# Patient Record
Sex: FEMALE | NOT HISPANIC OR LATINO | URBAN - METROPOLITAN AREA
[De-identification: names, ages, dates, MRNs, and addresses within clinical notes are randomized per-mention and may not be internally consistent; named-entity substitution may affect disease eponyms.]

---

## 2017-04-28 ENCOUNTER — TRANSFERRED RECORDS (OUTPATIENT)
Dept: HEALTH INFORMATION MANAGEMENT | Facility: CLINIC | Age: 57
End: 2017-04-28

## 2017-11-29 ENCOUNTER — OFFICE VISIT (OUTPATIENT)
Dept: INTERNAL MEDICINE | Facility: CLINIC | Age: 57
End: 2017-11-29
Attending: INTERNAL MEDICINE
Payer: COMMERCIAL

## 2017-11-29 VITALS
DIASTOLIC BLOOD PRESSURE: 75 MMHG | WEIGHT: 129 LBS | HEART RATE: 87 BPM | HEIGHT: 63 IN | BODY MASS INDEX: 22.86 KG/M2 | SYSTOLIC BLOOD PRESSURE: 132 MMHG

## 2017-11-29 DIAGNOSIS — E78.00 PURE HYPERCHOLESTEROLEMIA: Primary | ICD-10-CM

## 2017-11-29 DIAGNOSIS — N20.0 NEPHROLITHIASIS: ICD-10-CM

## 2017-11-29 DIAGNOSIS — Z90.721 S/P ABDOMINAL HYSTERECTOMY AND LEFT SALPINGO-OOPHORECTOMY: ICD-10-CM

## 2017-11-29 DIAGNOSIS — N60.01 BENIGN CYST OF BREAST, RIGHT: ICD-10-CM

## 2017-11-29 DIAGNOSIS — Z90.710 S/P ABDOMINAL HYSTERECTOMY AND LEFT SALPINGO-OOPHORECTOMY: ICD-10-CM

## 2017-11-29 DIAGNOSIS — Z90.79 S/P ABDOMINAL HYSTERECTOMY AND LEFT SALPINGO-OOPHORECTOMY: ICD-10-CM

## 2017-11-29 DIAGNOSIS — Z85.828 HISTORY OF SKIN CANCER: ICD-10-CM

## 2017-11-29 DIAGNOSIS — I10 BENIGN ESSENTIAL HYPERTENSION: ICD-10-CM

## 2017-11-29 LAB
ANION GAP SERPL CALCULATED.3IONS-SCNC: 8 MMOL/L (ref 3–14)
BUN SERPL-MCNC: 14 MG/DL (ref 7–30)
CALCIUM SERPL-MCNC: 9 MG/DL (ref 8.5–10.1)
CHLORIDE SERPL-SCNC: 101 MMOL/L (ref 94–109)
CHOLEST SERPL-MCNC: 195 MG/DL
CO2 SERPL-SCNC: 29 MMOL/L (ref 20–32)
CREAT SERPL-MCNC: 0.6 MG/DL (ref 0.52–1.04)
GFR SERPL CREATININE-BSD FRML MDRD: >90 ML/MIN/1.7M2
GLUCOSE SERPL-MCNC: 109 MG/DL (ref 70–99)
HDLC SERPL-MCNC: 79 MG/DL
LDLC SERPL CALC-MCNC: 101 MG/DL
NONHDLC SERPL-MCNC: 116 MG/DL
POTASSIUM SERPL-SCNC: 3.6 MMOL/L (ref 3.4–5.3)
SODIUM SERPL-SCNC: 138 MMOL/L (ref 133–144)
TRIGL SERPL-MCNC: 75 MG/DL

## 2017-11-29 PROCEDURE — 36415 COLL VENOUS BLD VENIPUNCTURE: CPT | Performed by: INTERNAL MEDICINE

## 2017-11-29 PROCEDURE — 80048 BASIC METABOLIC PNL TOTAL CA: CPT | Performed by: INTERNAL MEDICINE

## 2017-11-29 PROCEDURE — 99213 OFFICE O/P EST LOW 20 MIN: CPT | Mod: ZF

## 2017-11-29 PROCEDURE — 80061 LIPID PANEL: CPT | Performed by: INTERNAL MEDICINE

## 2017-11-29 RX ORDER — SIMVASTATIN 20 MG
20 TABLET ORAL AT BEDTIME
Qty: 90 TABLET | Refills: 1 | COMMUNITY
Start: 2017-11-29 | End: 2018-12-14

## 2017-11-29 RX ORDER — LISINOPRIL AND HYDROCHLOROTHIAZIDE 12.5; 2 MG/1; MG/1
1 TABLET ORAL DAILY
Qty: 90 TABLET | Refills: 3 | Status: SHIPPED | OUTPATIENT
Start: 2017-11-29 | End: 2018-11-30

## 2017-11-29 ASSESSMENT — PAIN SCALES - GENERAL: PAINLEVEL: NO PAIN (0)

## 2017-11-29 NOTE — PROGRESS NOTES
HPI  Patient is here to establish care. She denies active medical problems that need to be addressed today. She had a flu shot in October. She has not had pneumococcal vaccine.     Review of Systems     Constitutional:  Negative for fever, chills, fatigue and increased energy.   HENT:  Negative for ear pain and dry mouth.    Eyes:  Negative for pain, eye pain and decreased vision.   Respiratory:   Negative for cough and dyspnea on exertion.    Cardiovascular:  Negative for chest pain, dyspnea on exertion and edema.   Gastrointestinal:  Negative for abdominal pain, diarrhea and constipation.   Genitourinary:  Negative for hot flashes and postmenopausal bleeding.   Neurological:  Negative for dizziness and headaches.   Endo/Heme:  Negative for anemia, swollen glands and bruises/bleeds easily.   Psychiatric/Behavioral:  Negative for depression, decreased concentration, mood swings and panic attacks.    Breast:  Negative for breast discharge, breast mass, breast pain and nipple retraction.   Endocrine:  Negative for altered temperature regulation, polyphagia, polydipsia, unwanted hair growth and change in facial hair.    Current Outpatient Prescriptions   Medication     estrogens, conjugated, (PREMARIN) 0.3 MG tablet     lisinopril-hydrochlorothiazide (PRINZIDE/ZESTORETIC) 20-12.5 MG per tablet     simvastatin (ZOCOR) 20 MG tablet     No current facility-administered medications for this visit.      Past Medical History:   Diagnosis Date     Abnormal uterine bleeding      Essential hypertension      Hyperlipidemia      Nephrolithiasis      Past Surgical History:   Procedure Laterality Date     C VAGINAL HYSTERECTOMY       KNEE SURGERY       Family History   Problem Relation Age of Onset     Cirrhosis Mother      OSTEOPOROSIS Mother      Melanoma Mother      Pancreatic Cancer Father      Social History     Social History     Marital status:      Spouse name: N/A     Number of children: N/A     Years of education:  "N/A     Occupational History     Not on file.     Social History Main Topics     Smoking status: Never Smoker     Smokeless tobacco: Not on file     Alcohol use 0.6 oz/week     1 Glasses of wine per week     Drug use: No     Sexual activity: Yes     Partners: Male     Birth control/ protection: None     Other Topics Concern     Not on file     Social History Narrative     No narrative on file         Vitals:    11/29/17 1037 11/29/17 1039 11/29/17 1040   BP: 134/79 136/83 132/75   Pulse: 103 93 87   Weight: 58.5 kg (129 lb)     Height: 1.6 m (5' 3\")         Physical Exam   Constitutional: She is oriented to person, place, and time and well-developed, well-nourished, and in no distress.   HENT:   Head: Normocephalic and atraumatic.   Eyes: Conjunctivae are normal. Pupils are equal, round, and reactive to light.   Neck: Normal range of motion.   Cardiovascular: Normal rate, regular rhythm, normal heart sounds and intact distal pulses.    Pulmonary/Chest: Effort normal.   Abdominal: Soft.   Musculoskeletal: She exhibits no edema.   Neurological: She is alert and oriented to person, place, and time.   Skin: Skin is warm and dry.   Psychiatric: Mood, memory, affect and judgment normal.   Vitals reviewed.    Assessment and Plan:  Cande was seen today for establish care.    Diagnoses and all orders for this visit:    Pure hypercholesterolemia. Patient was advised on approach to hyperlipidemia and prevention of coronary artery disease. Recommend stopping statin therapy for 3 months to determine lipid baseline. Patient will follow up to discuss the risk and risk management. She is in agreement with the plan.   -     Lipid Profile  -     Lipid Profile; Future    Benign essential hypertension. Blood pressure is currently within acceptable range. Recommend to continue with current medical therapy. Patient was advised to check renal function and electrolytes.   -     lisinopril-hydrochlorothiazide (PRINZIDE/ZESTORETIC) 20-12.5 MG " per tablet; Take 1 tablet by mouth daily  -     Basic Metabolic Panel    S/P abdominal hysterectomy and left salpingo-oophorectomy. Reviewed risk for CAD and stroke in the setting of HRT. Patient is on low dose of HRT, understands the risks and benefits of therapy, including the risk of thromboembolic disease. She would like to continue with HRT at this time. Will address the need for therapy annually.   -     estrogens, conjugated, (PREMARIN) 0.3 MG tablet; Take 1 tablet (0.3 mg) by mouth daily    History of skin cancer  -     DERMATOLOGY REFERRAL    Benign cyst of breast, right  -     Mammo diagnostic  digital (bilateral); Future  -     US Breast Right Complete 4 Quadrants; Future    Nephrolithiasis. Discussed impact of hydrochlorothiazide on nephrolithiasis. Patient was advised to obtain CT to determine the size and location of the stones. Will also refer to Nephrology for stone risk assessment.   -     NEPHROLOGY ADULT REFERRAL  -     CT Abdomen Pelvis w/o Contrast; Future      Total time spent 45  minutes.  More than 50% of the time spent with Ms. Curiel on counseling / coordinating her care    Analilia Wiley MD

## 2017-11-29 NOTE — LETTER
12/6/2017         Cande Curiel   [address] [place] [address]        Dear Ms. Curiel:    Your glucose was mildly elevated.Other results were within acceptable range.   Analilia Wiley MD     Results for orders placed or performed in visit on 11/29/17   Basic Metabolic Panel   Result Value Ref Range    Sodium 138 133 - 144 mmol/L    Potassium 3.6 3.4 - 5.3 mmol/L    Chloride 101 94 - 109 mmol/L    Carbon Dioxide 29 20 - 32 mmol/L    Anion Gap 8 3 - 14 mmol/L    Glucose 109 (H) 70 - 99 mg/dL    Urea Nitrogen 14 7 - 30 mg/dL    Creatinine 0.60 0.52 - 1.04 mg/dL    GFR Estimate >90 >60 mL/min/1.7m2    GFR Estimate If Black >90 >60 mL/min/1.7m2    Calcium 9.0 8.5 - 10.1 mg/dL   Lipid Profile   Result Value Ref Range    Cholesterol 195 <200 mg/dL    Triglycerides 75 <150 mg/dL    HDL Cholesterol 79 >49 mg/dL    LDL Cholesterol Calculated 101 (H) <100 mg/dL    Non HDL Cholesterol 116 <130 mg/dL         Please note that test explanations are brief and do not reflect all diagnostic uses.  If you have any questions or concerns, please call the clinic at [phone].      Sincerely,      Sharon Huffman sent on behalf of  Analilia Wiley MD

## 2017-11-29 NOTE — LETTER
Date:December 4, 2017      Patient was self referred, no letter generated. Do not send.        [facility] Health Information

## 2017-11-29 NOTE — MR AVS SNAPSHOT
After Visit Summary   11/29/2017    Cande Curiel    MRN: [id number]           Patient Information     Date Of Birth          11/23/1950        Visit Information        Provider Department      11/29/2017 10:20 AM Analilia Wiley MD [facility]         Today's Diagnoses     Pure hypercholesterolemia    -  1    Benign essential hypertension        S/P abdominal hysterectomy and left salpingo-oophorectomy        History of skin cancer        Benign cyst of breast, right        Nephrolithiasis           Follow-ups after your visit        Additional Services     DERMATOLOGY REFERRAL       Your provider has referred you to: [facility]: Dermatology [facility] [phone]   [email or web address]    Please be aware that coverage of these services is subject to the terms and limitations of your health insurance plan.  Call member services at your health plan with any benefit or coverage questions.      Please bring the following with you to your appointment:    (1) Any X-Rays, CTs or MRIs which have been performed.  Contact the facility where they were done to arrange for  prior to your scheduled appointment.    (2) List of current medications  (3) This referral request   (4) Any documents/labs given to you for this referral            NEPHROLOGY ADULT REFERRAL       Your provider has referred you to: [facility]: Kidney (Nephrology) [facility] [phone]   [email or web address]    Please be aware that coverage of these services is subject to the terms and limitations of your health insurance plan.  Call member services at your health plan with any benefit or coverage questions.      Reason for referral:  Nephrolithiasis  Please bring the following to your appointment:    >>   Any x-rays, CTs or MRIs which have been performed.  Contact the facility where they were done to arrange for pick  up prior to your scheduled appointment.   >>   List of current medications   >>   This referral request   >>   Any documents/labs given to you for this referral                  Future tests that were ordered for you today     Open Future Orders        Priority Expected Expires Ordered    CT Abdomen Pelvis w/o Contrast Routine  2018    Lipid Profile Routine  2018    Mammo diagnostic  digital (bilateral) Routine  2018    US Breast Right Complete 4 Quadrants Routine  2018            Who to contact     Please call your clinic at [phone] to:    Ask questions about your health    Make or cancel appointments    Discuss your medicines    Learn about your test results    Speak to your doctor   If you have compliments or concerns about an experience at your clinic, or if you wish to file a complaint, please contact [facility] Patient Relations at [phone] or email us at [email or web address]         Additional Information About Your Visit        [employer] Information     [employer] is an electronic gateway that provides easy, online access to your medical records. With [employer], you can request a clinic appointment, read your test results, renew a prescription or communicate with your care team.     To sign up for [employer] visit the website at [email or web address]   You will be asked to enter the access code listed below, as well as some personal information. Please follow the directions to create your username and password.     Your access code is: [email or web address]  Expires: 2018  2:06 PM     Your access code will  in 90 days. If you need help or a new code, please contact your [facility] or call [phone] for assistance.        Care EveryWhere ID     This is your Care EveryWhere ID. This could be used by other organizations to access your [facility]  "records  [id number]        Your Vitals Were     Pulse Height BMI (Body Mass Index)             87 1.6 m (5' 3\") 22.85 kg/m2          Blood Pressure from Last 3 Encounters:   11/29/17 132/75    Weight from Last 3 Encounters:   11/29/17 58.5 kg (129 lb)              We Performed the Following     Basic Metabolic Panel     DERMATOLOGY REFERRAL     Lipid Profile     NEPHROLOGY ADULT REFERRAL          Today's Medication Changes          These changes are accurate as of: 11/29/17 11:23 AM.  If you have any questions, ask your nurse or doctor.               Start taking these medicines.        Dose/Directions    estrogens (conjugated) 0.3 MG tablet   Commonly known as:  PREMARIN   Used for:  S/P abdominal hysterectomy and left salpingo-oophorectomy   Started by:  Analilia Wiley MD        Dose:  0.3 mg   Take 1 tablet (0.3 mg) by mouth daily   Quantity:  90 tablet   Refills:  3       lisinopril-hydrochlorothiazide 20-12.5 MG per tablet   Commonly known as:  PRINZIDE/ZESTORETIC   Used for:  Benign essential hypertension   Started by:  Analilia Wiley MD        Dose:  1 tablet   Take 1 tablet by mouth daily   Quantity:  90 tablet   Refills:  3            Where to get your medicines      These medications were sent to [id number] - [place], [place] - [address], [place] [address]     Phone:  [phone]     estrogens (conjugated) 0.3 MG tablet    lisinopril-hydrochlorothiazide 20-12.5 MG per tablet                Primary Care Provider Office Phone # Fax #    Analilia Wiley [phone]       [facility] [address]  [facility] [address]        Equal Access to Services     [facility] CenterBRANDEE : Hadalba Garcia, jarred lulilly, qaybta kaalmada jeremias, libby jackman. So [facility] [phone].    ATENCIÓN: Si habla español, tiene a camacho disposición servicios gratuitos de " asistencia lingüística. Lucinda al [phone].    We comply with applicable federal civil rights laws and [place] laws. We do not discriminate on the basis of race, color, national origin, age, disability, sex, sexual orientation, or gender identity.            Thank you!     Thank you for choosing [facility]   for your care. Our goal is always to provide you with excellent care. Hearing back from our patients is one way we can continue to improve our services. Please take a few minutes to complete the written survey that you may receive in the mail after your visit with us. Thank you!             Your Updated Medication List - Protect others around you: Learn how to safely use, store and throw away your medicines at www.disposemymeds.org.          This list is accurate as of: 11/29/17 11:23 AM.  Always use your most recent med list.                   Brand Name Dispense Instructions for use Diagnosis    estrogens (conjugated) 0.3 MG tablet    PREMARIN    90 tablet    Take 1 tablet (0.3 mg) by mouth daily    S/P abdominal hysterectomy and left salpingo-oophorectomy       lisinopril-hydrochlorothiazide 20-12.5 MG per tablet    PRINZIDE/ZESTORETIC    90 tablet    Take 1 tablet by mouth daily    Benign essential hypertension       simvastatin 20 MG tablet    ZOCOR    90 tablet    Take 1 tablet (20 mg) by mouth At Bedtime    Pure hypercholesterolemia

## 2017-11-29 NOTE — LETTER
11/29/2017       RE: Cande Curiel  [address] [place] [address]     Dear Colleague,    Thank you for referring your patient, Cande Curiel, to the [facility]  at [facility]. Please see a copy of my visit note below.    HPI  Patient is here to establish care. She denies active medical problems that need to be addressed today. She had a flu shot in October. She has not had pneumococcal vaccine.     Review of Systems     Constitutional:  Negative for fever, chills, fatigue and increased energy.   HENT:  Negative for ear pain and dry mouth.    Eyes:  Negative for pain, eye pain and decreased vision.   Respiratory:   Negative for cough and dyspnea on exertion.    Cardiovascular:  Negative for chest pain, dyspnea on exertion and edema.   Gastrointestinal:  Negative for abdominal pain, diarrhea and constipation.   Genitourinary:  Negative for hot flashes and postmenopausal bleeding.   Neurological:  Negative for dizziness and headaches.   Endo/Heme:  Negative for anemia, swollen glands and bruises/bleeds easily.   Psychiatric/Behavioral:  Negative for depression, decreased concentration, mood swings and panic attacks.    Breast:  Negative for breast discharge, breast mass, breast pain and nipple retraction.   Endocrine:  Negative for altered temperature regulation, polyphagia, polydipsia, unwanted hair growth and change in facial hair.    Current Outpatient Prescriptions   Medication     estrogens, conjugated, (PREMARIN) 0.3 MG tablet     lisinopril-hydrochlorothiazide (PRINZIDE/ZESTORETIC) 20-12.5 MG per tablet     simvastatin (ZOCOR) 20 MG tablet     No current facility-administered medications for this visit.      Past Medical History:   Diagnosis Date     Abnormal uterine bleeding      Essential hypertension      Hyperlipidemia      Nephrolithiasis      Past Surgical History:   Procedure Laterality Date     C VAGINAL HYSTERECTOMY       KNEE  "SURGERY       Family History   Problem Relation Age of Onset     Cirrhosis Mother      OSTEOPOROSIS Mother      Melanoma Mother      Pancreatic Cancer Father      Social History     Social History     Marital status:      Spouse name: N/A     Number of children: N/A     Years of education: N/A     Occupational History     Not on file.     Social History Main Topics     Smoking status: Never Smoker     Smokeless tobacco: Not on file     Alcohol use 0.6 oz/week     1 Glasses of wine per week     Drug use: No     Sexual activity: Yes     Partners: Male     Birth control/ protection: None     Other Topics Concern     Not on file     Social History Narrative     No narrative on file         Vitals:    11/29/17 1037 11/29/17 1039 11/29/17 1040   BP: 134/79 136/83 132/75   Pulse: 103 93 87   Weight: 58.5 kg (129 lb)     Height: 1.6 m (5' 3\")         Physical Exam   Constitutional: She is oriented to person, place, and time and well-developed, well-nourished, and in no distress.   HENT:   Head: Normocephalic and atraumatic.   Eyes: Conjunctivae are normal. Pupils are equal, round, and reactive to light.   Neck: Normal range of motion.   Cardiovascular: Normal rate, regular rhythm, normal heart sounds and intact distal pulses.    Pulmonary/Chest: Effort normal.   Abdominal: Soft.   Musculoskeletal: She exhibits no edema.   Neurological: She is alert and oriented to person, place, and time.   Skin: Skin is warm and dry.   Psychiatric: Mood, memory, affect and judgment normal.   Vitals reviewed.    Assessment and Plan:  Cande was seen today for establish care.    Diagnoses and all orders for this visit:    Pure hypercholesterolemia. Patient was advised on approach to hyperlipidemia and prevention of coronary artery disease. Recommend stopping statin therapy for 3 months to determine lipid baseline. Patient will follow up to discuss the risk and risk management. She is in agreement with the plan.   -     Lipid Profile  -  "    Lipid Profile; Future    Benign essential hypertension. Blood pressure is currently within acceptable range. Recommend to continue with current medical therapy. Patient was advised to check renal function and electrolytes.   -     lisinopril-hydrochlorothiazide (PRINZIDE/ZESTORETIC) 20-12.5 MG per tablet; Take 1 tablet by mouth daily  -     Basic Metabolic Panel    S/P abdominal hysterectomy and left salpingo-oophorectomy. Reviewed risk for CAD and stroke in the setting of HRT. Patient is on low dose of HRT, understands the risks and benefits of therapy, including the risk of thromboembolic disease. She would like to continue with HRT at this time. Will address the need for therapy annually.   -     estrogens, conjugated, (PREMARIN) 0.3 MG tablet; Take 1 tablet (0.3 mg) by mouth daily    History of skin cancer  -     DERMATOLOGY REFERRAL    Benign cyst of breast, right  -     Mammo diagnostic  digital (bilateral); Future  -     US Breast Right Complete 4 Quadrants; Future    Nephrolithiasis. Discussed impact of hydrochlorothiazide on nephrolithiasis. Patient was advised to obtain CT to determine the size and location of the stones. Will also refer to Nephrology for stone risk assessment.   -     NEPHROLOGY ADULT REFERRAL  -     CT Abdomen Pelvis w/o Contrast; Future      Total time spent 45  minutes.  More than 50% of the time spent with Ms. Curiel on counseling / coordinating her care    Analilia Wiley MD              Again, thank you for allowing me to participate in the care of your patient.      Sincerely,    Analilia Wiley MD

## 2017-12-02 ASSESSMENT — ENCOUNTER SYMPTOMS
PANIC: 0
DYSPNEA ON EXERTION: 0
BREAST PAIN: 0
DECREASED CONCENTRATION: 0
DIARRHEA: 0
INSOMNIA: 0
CONSTIPATION: 0
FATIGUE: 0
HOT FLASHES: 0
HEADACHES: 0
INCREASED ENERGY: 0
ALTERED TEMPERATURE REGULATION: 0
NERVOUS/ANXIOUS: 0
BREAST MASS: 0
CHILLS: 0
SWOLLEN GLANDS: 0
FEVER: 0
ABDOMINAL PAIN: 0
POLYPHAGIA: 0
POLYDIPSIA: 0
DEPRESSION: 0
COUGH: 0
BRUISES/BLEEDS EASILY: 0
DIZZINESS: 0
EYE PAIN: 0

## 2017-12-06 ENCOUNTER — HOSPITAL ENCOUNTER (OUTPATIENT)
Dept: CT IMAGING | Facility: CLINIC | Age: 57
Discharge: HOME | End: 2017-12-06
Attending: INTERNAL MEDICINE | Admitting: INTERNAL MEDICINE
Payer: COMMERCIAL

## 2017-12-06 DIAGNOSIS — N20.0 NEPHROLITHIASIS: ICD-10-CM

## 2017-12-06 PROCEDURE — 74176 CT ABD & PELVIS W/O CONTRAST: CPT

## 2018-01-28 ENCOUNTER — HEALTH MAINTENANCE LETTER (OUTPATIENT)
Age: 58
End: 2018-01-28

## 2018-11-30 DIAGNOSIS — I10 BENIGN ESSENTIAL HYPERTENSION: ICD-10-CM

## 2018-12-07 RX ORDER — LISINOPRIL AND HYDROCHLOROTHIAZIDE 12.5; 2 MG/1; MG/1
TABLET ORAL
Qty: 30 TABLET | Refills: 0 | Status: SHIPPED | OUTPATIENT
Start: 2018-12-07 | End: 2018-12-14

## 2018-12-07 NOTE — TELEPHONE ENCOUNTER
Received refill request for lisinopril/hctz.  Last in clinic 12/2017.    Tried to reach Cande but received voicemail.  Left message that refill request was received and a one month supply can be sent to pharmacy but office visit is due for further refills. Please call [phone] to schedule.

## 2018-12-14 ENCOUNTER — ANCILLARY PROCEDURE (OUTPATIENT)
Dept: MAMMOGRAPHY | Facility: CLINIC | Age: 58
End: 2018-12-14
Payer: COMMERCIAL

## 2018-12-14 ENCOUNTER — OFFICE VISIT (OUTPATIENT)
Dept: INTERNAL MEDICINE | Facility: CLINIC | Age: 58
End: 2018-12-14
Attending: INTERNAL MEDICINE
Payer: COMMERCIAL

## 2018-12-14 VITALS
HEIGHT: 63 IN | BODY MASS INDEX: 22.86 KG/M2 | SYSTOLIC BLOOD PRESSURE: 156 MMHG | WEIGHT: 129 LBS | DIASTOLIC BLOOD PRESSURE: 91 MMHG | HEART RATE: 120 BPM

## 2018-12-14 DIAGNOSIS — Z00.00 ROUTINE HISTORY AND PHYSICAL EXAMINATION OF ADULT: Primary | ICD-10-CM

## 2018-12-14 DIAGNOSIS — Z90.721 S/P ABDOMINAL HYSTERECTOMY AND LEFT SALPINGO-OOPHORECTOMY: ICD-10-CM

## 2018-12-14 DIAGNOSIS — Z11.4 ENCOUNTER FOR SCREENING FOR HIV: ICD-10-CM

## 2018-12-14 DIAGNOSIS — Z23 NEED FOR IMMUNIZATION AGAINST INFLUENZA: ICD-10-CM

## 2018-12-14 DIAGNOSIS — Z23 NEED FOR TDAP VACCINATION: ICD-10-CM

## 2018-12-14 DIAGNOSIS — I10 BENIGN ESSENTIAL HYPERTENSION: ICD-10-CM

## 2018-12-14 DIAGNOSIS — Z90.79 S/P ABDOMINAL HYSTERECTOMY AND LEFT SALPINGO-OOPHORECTOMY: ICD-10-CM

## 2018-12-14 DIAGNOSIS — Z12.31 VISIT FOR SCREENING MAMMOGRAM: ICD-10-CM

## 2018-12-14 DIAGNOSIS — Z90.710 S/P ABDOMINAL HYSTERECTOMY AND LEFT SALPINGO-OOPHORECTOMY: ICD-10-CM

## 2018-12-14 DIAGNOSIS — Z11.59 NEED FOR HEPATITIS C SCREENING TEST: ICD-10-CM

## 2018-12-14 DIAGNOSIS — Z13.220 SCREENING FOR HYPERLIPIDEMIA: ICD-10-CM

## 2018-12-14 LAB
ANION GAP SERPL CALCULATED.3IONS-SCNC: 4 MMOL/L (ref 3–14)
BUN SERPL-MCNC: 14 MG/DL (ref 7–30)
CALCIUM SERPL-MCNC: 8.9 MG/DL (ref 8.5–10.1)
CHLORIDE SERPL-SCNC: 103 MMOL/L (ref 94–109)
CHOLEST SERPL-MCNC: 225 MG/DL
CO2 SERPL-SCNC: 32 MMOL/L (ref 20–32)
CREAT SERPL-MCNC: 0.68 MG/DL (ref 0.52–1.04)
GFR SERPL CREATININE-BSD FRML MDRD: 90 ML/MIN/1.7M2
GLUCOSE SERPL-MCNC: 99 MG/DL (ref 70–99)
HCV AB SERPL QL IA: NONREACTIVE
HDLC SERPL-MCNC: 69 MG/DL
HIV 1+2 AB+HIV1 P24 AG SERPL QL IA: NONREACTIVE
LDLC SERPL CALC-MCNC: 137 MG/DL
NONHDLC SERPL-MCNC: 156 MG/DL
POTASSIUM SERPL-SCNC: 3.6 MMOL/L (ref 3.4–5.3)
SODIUM SERPL-SCNC: 139 MMOL/L (ref 133–144)
TRIGL SERPL-MCNC: 94 MG/DL

## 2018-12-14 PROCEDURE — 80061 LIPID PANEL: CPT | Performed by: INTERNAL MEDICINE

## 2018-12-14 PROCEDURE — 90715 TDAP VACCINE 7 YRS/> IM: CPT | Mod: ZF

## 2018-12-14 PROCEDURE — 36415 COLL VENOUS BLD VENIPUNCTURE: CPT | Performed by: INTERNAL MEDICINE

## 2018-12-14 PROCEDURE — G0463 HOSPITAL OUTPT CLINIC VISIT: HCPCS | Mod: ZF

## 2018-12-14 PROCEDURE — 80048 BASIC METABOLIC PNL TOTAL CA: CPT | Performed by: INTERNAL MEDICINE

## 2018-12-14 PROCEDURE — G0008 ADMIN INFLUENZA VIRUS VAC: HCPCS | Mod: ZF

## 2018-12-14 PROCEDURE — 86803 HEPATITIS C AB TEST: CPT | Performed by: INTERNAL MEDICINE

## 2018-12-14 PROCEDURE — 25000128 H RX IP 250 OP 636: Mod: ZF

## 2018-12-14 PROCEDURE — 90686 IIV4 VACC NO PRSV 0.5 ML IM: CPT | Mod: ZF

## 2018-12-14 PROCEDURE — 90472 IMMUNIZATION ADMIN EACH ADD: CPT | Mod: ZF

## 2018-12-14 PROCEDURE — 87389 HIV-1 AG W/HIV-1&-2 AB AG IA: CPT | Performed by: INTERNAL MEDICINE

## 2018-12-14 RX ORDER — LISINOPRIL AND HYDROCHLOROTHIAZIDE 12.5; 2 MG/1; MG/1
1 TABLET ORAL DAILY
Qty: 90 TABLET | Refills: 3 | Status: SHIPPED | OUTPATIENT
Start: 2018-12-14 | End: 2019-12-20

## 2018-12-14 ASSESSMENT — ENCOUNTER SYMPTOMS
SPUTUM PRODUCTION: 0
NIGHT SWEATS: 0
SORE THROAT: 0
BLOATING: 0
LEG SWELLING: 0
DIFFICULTY URINATING: 0
WEIGHT LOSS: 0
STIFFNESS: 0
SINUS CONGESTION: 0
EYE REDNESS: 0
EYE IRRITATION: 0
RECTAL BLEEDING: 0
COUGH DISTURBING SLEEP: 0
ABDOMINAL PAIN: 0
SMELL DISTURBANCE: 0
TROUBLE SWALLOWING: 0
BRUISES/BLEEDS EASILY: 0
BLOOD IN STOOL: 0
BREAST MASS: 0
HEMOPTYSIS: 0
SKIN CHANGES: 0
RECTAL PAIN: 0
EXTREMITY NUMBNESS: 0
TASTE DISTURBANCE: 0
POLYPHAGIA: 0
POOR WOUND HEALING: 0
CLAUDICATION: 0
POSTURAL DYSPNEA: 0
DISTURBANCES IN COORDINATION: 0
BOWEL INCONTINENCE: 0
FATIGUE: 0
DECREASED LIBIDO: 0
PALPITATIONS: 0
ALTERED TEMPERATURE REGULATION: 0
HOARSE VOICE: 0
TREMORS: 0
MYALGIAS: 0
MEMORY LOSS: 0
BACK PAIN: 0
ORTHOPNEA: 0
SYNCOPE: 0
LIGHT-HEADEDNESS: 0
MUSCLE WEAKNESS: 0
WEAKNESS: 0
FEVER: 0
DEPRESSION: 0
NAUSEA: 0
SWOLLEN GLANDS: 0
DYSURIA: 0
NECK MASS: 0
SINUS PAIN: 0
LEG PAIN: 0
NERVOUS/ANXIOUS: 0
HYPERTENSION: 0
TACHYCARDIA: 0
EYE PAIN: 0
JAUNDICE: 0
EYE WATERING: 0
COUGH: 0
HEMATURIA: 0
MUSCLE CRAMPS: 0
CONSTIPATION: 0
ARTHRALGIAS: 0
JOINT SWELLING: 0
SLEEP DISTURBANCES DUE TO BREATHING: 0
WEIGHT GAIN: 0
HYPOTENSION: 0
DECREASED CONCENTRATION: 0
SEIZURES: 0
NUMBNESS: 0
NECK PAIN: 0
NAIL CHANGES: 0
SPEECH CHANGE: 0
SNORES LOUDLY: 0
SHORTNESS OF BREATH: 0
DIZZINESS: 0
HEARTBURN: 0
EXERCISE INTOLERANCE: 0
INCREASED ENERGY: 0
HALLUCINATIONS: 0
HOT FLASHES: 0
PARALYSIS: 0
DYSPNEA ON EXERTION: 0
BREAST PAIN: 0
DOUBLE VISION: 0
WHEEZING: 0
DECREASED APPETITE: 0
RESPIRATORY PAIN: 0
FLANK PAIN: 0
LOSS OF CONSCIOUSNESS: 0
POLYDIPSIA: 0
HEADACHES: 0
INSOMNIA: 0
DIARRHEA: 0
CHILLS: 0
VOMITING: 0
TINGLING: 0
PANIC: 0

## 2018-12-14 ASSESSMENT — PATIENT HEALTH QUESTIONNAIRE - PHQ9
5. POOR APPETITE OR OVEREATING: NOT AT ALL
SUM OF ALL RESPONSES TO PHQ QUESTIONS 1-9: 0

## 2018-12-14 ASSESSMENT — ANXIETY QUESTIONNAIRES
2. NOT BEING ABLE TO STOP OR CONTROL WORRYING: NOT AT ALL
5. BEING SO RESTLESS THAT IT IS HARD TO SIT STILL: NOT AT ALL
6. BECOMING EASILY ANNOYED OR IRRITABLE: NOT AT ALL
3. WORRYING TOO MUCH ABOUT DIFFERENT THINGS: NOT AT ALL
1. FEELING NERVOUS, ANXIOUS, OR ON EDGE: SEVERAL DAYS
7. FEELING AFRAID AS IF SOMETHING AWFUL MIGHT HAPPEN: NOT AT ALL
GAD7 TOTAL SCORE: 1

## 2018-12-14 ASSESSMENT — MIFFLIN-ST. JEOR: SCORE: 1144.27

## 2018-12-14 ASSESSMENT — PAIN SCALES - GENERAL: PAINLEVEL: NO PAIN (0)

## 2018-12-14 NOTE — LETTER
1/11/2019         Cande Curiel   [address] [place] [address]        Dear Ms. Curiel:    Cande-[facility] Hepatitis C and HIV screens are negative        Results for orders placed or performed in visit on 12/14/18   Lipid Panel   Result Value Ref Range    Cholesterol 225 (H) <200 mg/dL    Triglycerides 94 <150 mg/dL    HDL Cholesterol 69 >49 mg/dL    LDL Cholesterol Calculated 137 (H) <100 mg/dL    Non HDL Cholesterol 156 (H) <130 mg/dL   Basic Metabolic Panel   Result Value Ref Range    Sodium 139 133 - 144 mmol/L    Potassium 3.6 3.4 - 5.3 mmol/L    Chloride 103 94 - 109 mmol/L    Carbon Dioxide 32 20 - 32 mmol/L    Anion Gap 4 3 - 14 mmol/L    Glucose 99 70 - 99 mg/dL    Urea Nitrogen 14 7 - 30 mg/dL    Creatinine 0.68 0.52 - 1.04 mg/dL    GFR Estimate 90 >60 mL/min/1.7m2    GFR Estimate If Black >90 >60 mL/min/1.7m2    Calcium 8.9 8.5 - 10.1 mg/dL   Hepatitis C Screen Reflex to HCV RNA Quant and Genotype   Result Value Ref Range    Hepatitis C Antibody Nonreactive NR^Nonreactive   HIV Antigen Antibody Combo   Result Value Ref Range    HIV Antigen Antibody Combo Nonreactive NR^Nonreactive             Please note that test explanations are brief and do not reflect all diagnostic uses.  If you have any questions or concerns, please call the clinic at [phone].      Sincerely,      Sharon Huffman sent on behalf of  Frances Hawthorne MD

## 2018-12-14 NOTE — PATIENT INSTRUCTIONS
Check on insurance coverage for Shingles vaccine (Shingrix)    Check blood pressure a couple times/week, send me [employer] Message with reading

## 2018-12-14 NOTE — LETTER
"12/14/2018       RE: Cande Curiel  [address] [place] [address]     Dear Colleague,    Thank you for referring your patient, Cande Curiel, to the [facility]  at [facility]. Please see a copy of my visit note below.        HPI:       Cande Curiel is a 58 year old female who presents for the following  Patient presents with: Physical (Annual exam)    Cande is here for an annual physical. She is due to have her cholesterol checked after she stopped her simvastatin a year ago. Also, she has been on premarin since age 41 when she had a hysterectomy. She is wondering whether or not she should continue this. Her blood pressure is quite high today, but she notes being anxious at the doctor's office. She does not check her BP at home. She does get fruits/vegetables in her diet on a regular basis, and gets daily exercise with yoga, sculpt, running and spin class. She has had an eye exam and is due for a dental exam.     Problem, Medication and Allergy Lists were reviewed and are current.  Patient is an established patient of this clinic. and I reviewed  Past Medical History, Family History and Social History..         Physical Exam:   BP (!) 165/97   Pulse 116   Ht 1.6 m (5' 3\")   Wt 58.5 kg (129 lb)   Breastfeeding? No   BMI 22.85 kg/m     Body mass index is 22.85 kg/m .  Vitals were reviewed       GENERAL APPEARANCE: healthy, alert and no distress     EYES: EOMI, PERRL     HENT: ear canals and TM's normal and nose and mouth without ulcers or lesions     NECK: no adenopathy, no asymmetry, masses, or scars and thyroid normal to palpation     RESP: lungs clear to auscultation - no rales, rhonchi or wheezes     CV: regular rates and rhythm, normal S1 S2, no S3 or S4 and no murmur, click or rub     ABDOMEN:  soft, nontender, no HSM or masses and bowel sounds normal     MS: extremities normal- no gross deformities noted, no evidence of inflammation " in joints, FROM in all extremities.     SKIN: no suspicious lesions or rashes     NEURO: Normal strength and tone, sensory exam grossly normal, mentation intact and speech normal     PSYCH: mentation appears normal. and affect normal/bright     LYMPHATICS: No cervical adenopathy        Results:      Results from the last 24 hours  Results for orders placed or performed in visit on 12/14/18 (from the past 24 hour(s))   Lipid Panel   Result Value Ref Range    Cholesterol 225 (H) <200 mg/dL    Triglycerides 94 <150 mg/dL    HDL Cholesterol 69 >49 mg/dL    LDL Cholesterol Calculated 137 (H) <100 mg/dL    Non HDL Cholesterol 156 (H) <130 mg/dL   Basic Metabolic Panel   Result Value Ref Range    Sodium 139 133 - 144 mmol/L    Potassium 3.6 3.4 - 5.3 mmol/L    Chloride 103 94 - 109 mmol/L    Carbon Dioxide 32 20 - 32 mmol/L    Anion Gap 4 3 - 14 mmol/L    Glucose 99 70 - 99 mg/dL    Urea Nitrogen 14 7 - 30 mg/dL    Creatinine 0.68 0.52 - 1.04 mg/dL    GFR Estimate 90 >60 mL/min/1.7m2    GFR Estimate If Black >90 >60 mL/min/1.7m2    Calcium 8.9 8.5 - 10.1 mg/dL     Assessment and Plan     Cande was seen today for physical.     Due for lipids, Hep C screen, HIV Screen, Influenza vaccine, Tdap.     Regarding elevated BP, I do suspect this is more anxiety driven as pt is also tachycardic (otherwise asymptomatic so do not suspect underlying medical pathology). Will have her get a BP cuff and check a few times/week. She will send me the readings via [employer]. If still elevated at home, will increase her lisinopril-hydrochlorothiazide dose.     Regarding premarin, unlikely to be adding much at this time. Will try to taper off. Decrease to 1/2 tab x30 days, then 1/2 tab every other day x30 days, then stop. Will let me know if having symptoms from this.     Diagnoses and all orders for this visit:    Routine history and physical examination of adult    S/P abdominal hysterectomy and left salpingo-oophorectomy  -     estrogen conj  (PREMARIN) 0.3 MG tablet; Take 0.5 tablets (0.15 mg) by mouth daily for 30 days, THEN 0.5 tablets (0.15 mg) every other day.    Benign essential hypertension  -     lisinopril-hydrochlorothiazide (PRINZIDE/ZESTORETIC) 20-12.5 MG tablet; Take 1 tablet by mouth daily  -     Basic Metabolic Panel  -     Blood Pressure Monitoring (ADULT BLOOD PRESSURE CUFF LG) KIT; 1 each daily    Screening for hyperlipidemia  -     Lipid Panel    Need for hepatitis C screening test  -     Hepatitis C Screen Reflex to HCV RNA Quant and Genotype    Encounter for screening for HIV  -     HIV Antigen Antibody Combo    Need for Tdap vaccination  -     TDAP VACCINE (BOOSTRIX)    Need for immunization against influenza  -     FLU Vaccine, 3 YRS +, Quadrivalent        Options for treatment and follow-up care were reviewed with the patient. Cande Curiel engaged in the decision making process and verbalized understanding of the options discussed and agreed with the final plan.    Frances Andino MD  Dec 14, 2018

## 2018-12-14 NOTE — LETTER
1/4/2019         Cande Curiel   [address] [place] [address]        Dear Ms. Curiel:    Cande-Your kidney function and blood sugar look good. Your total cholesterol is slightly elevated, but when I calculate your risk for cardiovascular disease based on your numbers, your risk is 4%. We do not need to start medication until that number is >7.5%.     Results for orders placed or performed in visit on 12/14/18   Lipid Panel   Result Value Ref Range    Cholesterol 225 (H) <200 mg/dL    Triglycerides 94 <150 mg/dL    HDL Cholesterol 69 >49 mg/dL    LDL Cholesterol Calculated 137 (H) <100 mg/dL    Non HDL Cholesterol 156 (H) <130 mg/dL   Basic Metabolic Panel   Result Value Ref Range    Sodium 139 133 - 144 mmol/L    Potassium 3.6 3.4 - 5.3 mmol/L    Chloride 103 94 - 109 mmol/L    Carbon Dioxide 32 20 - 32 mmol/L    Anion Gap 4 3 - 14 mmol/L    Glucose 99 70 - 99 mg/dL    Urea Nitrogen 14 7 - 30 mg/dL    Creatinine 0.68 0.52 - 1.04 mg/dL    GFR Estimate 90 >60 mL/min/1.7m2    GFR Estimate If Black >90 >60 mL/min/1.7m2    Calcium 8.9 8.5 - 10.1 mg/dL   Hepatitis C Screen Reflex to HCV RNA Quant and Genotype   Result Value Ref Range    Hepatitis C Antibody Nonreactive NR^Nonreactive   HIV Antigen Antibody Combo   Result Value Ref Range    HIV Antigen Antibody Combo Nonreactive NR^Nonreactive             Please note that test explanations are brief and do not reflect all diagnostic uses.  If you have any questions or concerns, please call the clinic at [phone].      Sincerely,      Sharon Huffamn sent on behalf of Frances Hawthorne MD

## 2018-12-14 NOTE — PROGRESS NOTES
HPI:       Cande Curiel is a 58 year old female who presents for the following  Patient presents with: Physical (Annual exam)    Cande is here for an annual physical. She is due to have her cholesterol checked after she stopped her simvastatin a year ago. Also, she has been on premarin since age 41 when she had a hysterectomy. She is wondering whether or not she should continue this. Her blood pressure is quite high today, but she notes being anxious at the doctor's office. She does not check her BP at home. She does get fruits/vegetables in her diet on a regular basis, and gets daily exercise with yoga, sculpt, running and spin class. She has had an eye exam and is due for a dental exam.     Problem, Medication and Allergy Lists were reviewed and are current.  Patient is an established patient of this clinic. and I reviewed  Past Medical History, Family History and Social History..         Review of Systems:   Review of Systems     Constitutional:  Negative for fever, chills, weight loss, weight gain, fatigue, decreased appetite, night sweats, recent stressors, height gain, height loss, post-operative complications, incisional pain, hallucinations, increased energy, hyperactivity and confused.   HENT:  Negative for ear pain, hearing loss, tinnitus, nosebleeds, trouble swallowing, hoarse voice, mouth sores, sore throat, ear discharge, tooth pain, gum tenderness, taste disturbance, smell disturbance, hearing aid, bleeding gums, dry mouth, sinus pain, sinus congestion and neck mass.    Eyes:  Negative for double vision, pain, redness, eye pain, decreased vision, eye watering, eye bulging, eye dryness, flashing lights, spots, floaters, strabismus, tunnel vision, jaundice and eye irritation.   Respiratory:   Negative for cough, hemoptysis, sputum production, shortness of breath, wheezing, sleep disturbances due to breathing, snores loudly, respiratory pain, dyspnea on exertion, cough disturbing sleep and postural  dyspnea.    Cardiovascular:  Negative for chest pain, dyspnea on exertion, palpitations, orthopnea, claudication, leg swelling, fingers/toes turn blue, hypertension, hypotension, syncope, history of heart murmur, chest pain on exertion, chest pain at rest, pacemaker, few scattered varicosities, leg pain, sleep disturbances due to breathing, tachycardia, light-headedness, exercise intolerance and edema.   Gastrointestinal:  Negative for heartburn, nausea, vomiting, abdominal pain, diarrhea, constipation, blood in stool, melena, rectal pain, bloating, hemorrhoids, bowel incontinence, jaundice, rectal bleeding, coffee ground emesis and change in stool.   Genitourinary:  Negative for bladder incontinence, dysuria, urgency, hematuria, flank pain, vaginal discharge, difficulty urinating, genital sores, dyspareunia, decreased libido, nocturia, voiding less frequently, arousal difficulty, abnormal vaginal bleeding, excessive menstruation, menstrual changes, hot flashes, vaginal dryness and postmenopausal bleeding.   Musculoskeletal:  Negative for myalgias, back pain, joint swelling, arthralgias, stiffness, muscle cramps, neck pain, bone pain, muscle weakness and fracture.   Skin:  Negative for nail changes, itching, poor wound healing, rash, hair changes, skin changes, acne, warts, poor wound healing, scarring, flaky skin, Raynaud's phenomenon, sensitivity to sunlight and skin thickening.   Neurological:  Negative for dizziness, tingling, tremors, speech change, seizures, loss of consciousness, weakness, light-headedness, numbness, headaches, disturbances in coordination, extremity numbness, memory loss, difficulty walking and paralysis.   Endo/Heme:  Negative for anemia, swollen glands and bruises/bleeds easily.   Psychiatric/Behavioral:  Negative for depression, hallucinations, memory loss, decreased concentration, mood swings and panic attacks.    Breast:  Negative for breast discharge, breast mass, breast pain and  "nipple retraction.   Endocrine:  Negative for altered temperature regulation, polyphagia, polydipsia, unwanted hair growth and change in facial hair.    I have personally reviewed and updated the ROS on the day of the visit.           Physical Exam:   BP (!) 165/97   Pulse 116   Ht 1.6 m (5' 3\")   Wt 58.5 kg (129 lb)   Breastfeeding? No   BMI 22.85 kg/m    Body mass index is 22.85 kg/m .  Vitals were reviewed       GENERAL APPEARANCE: healthy, alert and no distress     EYES: EOMI, PERRL     HENT: ear canals and TM's normal and nose and mouth without ulcers or lesions     NECK: no adenopathy, no asymmetry, masses, or scars and thyroid normal to palpation     RESP: lungs clear to auscultation - no rales, rhonchi or wheezes     CV: regular rates and rhythm, normal S1 S2, no S3 or S4 and no murmur, click or rub     ABDOMEN:  soft, nontender, no HSM or masses and bowel sounds normal     MS: extremities normal- no gross deformities noted, no evidence of inflammation in joints, FROM in all extremities.     SKIN: no suspicious lesions or rashes     NEURO: Normal strength and tone, sensory exam grossly normal, mentation intact and speech normal     PSYCH: mentation appears normal. and affect normal/bright     LYMPHATICS: No cervical adenopathy        Results:      Results from the last 24 hours  Results for orders placed or performed in visit on 12/14/18 (from the past 24 hour(s))   Lipid Panel   Result Value Ref Range    Cholesterol 225 (H) <200 mg/dL    Triglycerides 94 <150 mg/dL    HDL Cholesterol 69 >49 mg/dL    LDL Cholesterol Calculated 137 (H) <100 mg/dL    Non HDL Cholesterol 156 (H) <130 mg/dL   Basic Metabolic Panel   Result Value Ref Range    Sodium 139 133 - 144 mmol/L    Potassium 3.6 3.4 - 5.3 mmol/L    Chloride 103 94 - 109 mmol/L    Carbon Dioxide 32 20 - 32 mmol/L    Anion Gap 4 3 - 14 mmol/L    Glucose 99 70 - 99 mg/dL    Urea Nitrogen 14 7 - 30 mg/dL    Creatinine 0.68 0.52 - 1.04 mg/dL    GFR " Estimate 90 >60 mL/min/1.7m2    GFR Estimate If Black >90 >60 mL/min/1.7m2    Calcium 8.9 8.5 - 10.1 mg/dL     Assessment and Plan     Cande was seen today for physical.     Due for lipids, Hep C screen, HIV Screen, Influenza vaccine, Tdap.     Regarding elevated BP, I do suspect this is more anxiety driven as pt is also tachycardic (otherwise asymptomatic so do not suspect underlying medical pathology). Will have her get a BP cuff and check a few times/week. She will send me the readings via [employer]. If still elevated at home, will increase her lisinopril-hydrochlorothiazide dose.     Regarding premarin, unlikely to be adding much at this time. Will try to taper off. Decrease to 1/2 tab x30 days, then 1/2 tab every other day x30 days, then stop. Will let me know if having symptoms from this.     Diagnoses and all orders for this visit:    Routine history and physical examination of adult    S/P abdominal hysterectomy and left salpingo-oophorectomy  -     estrogen conj (PREMARIN) 0.3 MG tablet; Take 0.5 tablets (0.15 mg) by mouth daily for 30 days, THEN 0.5 tablets (0.15 mg) every other day.    Benign essential hypertension  -     lisinopril-hydrochlorothiazide (PRINZIDE/ZESTORETIC) 20-12.5 MG tablet; Take 1 tablet by mouth daily  -     Basic Metabolic Panel  -     Blood Pressure Monitoring (ADULT BLOOD PRESSURE CUFF LG) KIT; 1 each daily    Screening for hyperlipidemia  -     Lipid Panel    Need for hepatitis C screening test  -     Hepatitis C Screen Reflex to HCV RNA Quant and Genotype    Encounter for screening for HIV  -     HIV Antigen Antibody Combo    Need for Tdap vaccination  -     TDAP VACCINE (BOOSTRIX)    Need for immunization against influenza  -     FLU Vaccine, 3 YRS +, Quadrivalent        Options for treatment and follow-up care were reviewed with the patient. Cande Curiel engaged in the decision making process and verbalized understanding of the options discussed and agreed with the final  plan.    Frances Andino MD  Dec 14, 2018

## 2018-12-15 ASSESSMENT — ANXIETY QUESTIONNAIRES: GAD7 TOTAL SCORE: 1

## 2019-12-17 ENCOUNTER — OFFICE VISIT (OUTPATIENT)
Dept: INTERNAL MEDICINE | Facility: CLINIC | Age: 59
End: 2019-12-17
Attending: INTERNAL MEDICINE
Payer: COMMERCIAL

## 2019-12-17 VITALS
SYSTOLIC BLOOD PRESSURE: 149 MMHG | WEIGHT: 125 LBS | DIASTOLIC BLOOD PRESSURE: 84 MMHG | BODY MASS INDEX: 22.15 KG/M2 | HEIGHT: 63 IN | HEART RATE: 90 BPM

## 2019-12-17 DIAGNOSIS — Z00.00 ROUTINE GENERAL MEDICAL EXAMINATION AT A HEALTH CARE FACILITY: Primary | ICD-10-CM

## 2019-12-17 LAB
ANION GAP SERPL CALCULATED.3IONS-SCNC: 5 MMOL/L (ref 3–14)
BUN SERPL-MCNC: 11 MG/DL (ref 7–30)
CALCIUM SERPL-MCNC: 9.6 MG/DL (ref 8.5–10.1)
CHLORIDE SERPL-SCNC: 104 MMOL/L (ref 94–109)
CHOLEST SERPL-MCNC: 275 MG/DL
CO2 SERPL-SCNC: 30 MMOL/L (ref 20–32)
CREAT SERPL-MCNC: 0.57 MG/DL (ref 0.52–1.04)
GFR SERPL CREATININE-BSD FRML MDRD: >90 ML/MIN/{1.73_M2}
GLUCOSE SERPL-MCNC: 107 MG/DL (ref 70–99)
HDLC SERPL-MCNC: 62 MG/DL
LDLC SERPL CALC-MCNC: 186 MG/DL
NONHDLC SERPL-MCNC: 213 MG/DL
POTASSIUM SERPL-SCNC: 3.5 MMOL/L (ref 3.4–5.3)
SODIUM SERPL-SCNC: 139 MMOL/L (ref 133–144)
TRIGL SERPL-MCNC: 136 MG/DL

## 2019-12-17 PROCEDURE — 36415 COLL VENOUS BLD VENIPUNCTURE: CPT | Performed by: INTERNAL MEDICINE

## 2019-12-17 PROCEDURE — 25000128 H RX IP 250 OP 636: Mod: ZF

## 2019-12-17 PROCEDURE — 80048 BASIC METABOLIC PNL TOTAL CA: CPT | Performed by: INTERNAL MEDICINE

## 2019-12-17 PROCEDURE — G0008 ADMIN INFLUENZA VIRUS VAC: HCPCS | Mod: ZF

## 2019-12-17 PROCEDURE — 80061 LIPID PANEL: CPT | Performed by: INTERNAL MEDICINE

## 2019-12-17 PROCEDURE — 90686 IIV4 VACC NO PRSV 0.5 ML IM: CPT | Mod: ZF

## 2019-12-17 PROCEDURE — G0463 HOSPITAL OUTPT CLINIC VISIT: HCPCS | Mod: ZF

## 2019-12-17 ASSESSMENT — ENCOUNTER SYMPTOMS
NERVOUS/ANXIOUS: 0
EYE IRRITATION: 0
JOINT SWELLING: 0
BLOATING: 0
ARTHRALGIAS: 0
RECTAL BLEEDING: 0
TASTE DISTURBANCE: 0
BACK PAIN: 0
WEIGHT LOSS: 0
CLAUDICATION: 0
SINUS CONGESTION: 0
VOMITING: 0
CONSTIPATION: 0
DISTURBANCES IN COORDINATION: 0
SINUS PAIN: 0
MYALGIAS: 0
FATIGUE: 0
PALPITATIONS: 0
DYSURIA: 0
NUMBNESS: 0
DIZZINESS: 0
ALTERED TEMPERATURE REGULATION: 0
SKIN CHANGES: 0
INCREASED ENERGY: 0
HOARSE VOICE: 0
FEVER: 0
HEMOPTYSIS: 0
HEMATURIA: 0
NECK PAIN: 0
POLYPHAGIA: 0
POOR WOUND HEALING: 0
BREAST PAIN: 0
BRUISES/BLEEDS EASILY: 0
INSOMNIA: 0
CHILLS: 0
SWOLLEN GLANDS: 0
SMELL DISTURBANCE: 0
EXTREMITY NUMBNESS: 0
POSTURAL DYSPNEA: 0
SPEECH CHANGE: 0
BREAST MASS: 0
DIARRHEA: 0
MUSCLE CRAMPS: 0
NAUSEA: 0
DYSPNEA ON EXERTION: 0
LEG PAIN: 0
EYE PAIN: 0
SYNCOPE: 0
COUGH DISTURBING SLEEP: 0
TREMORS: 0
NECK MASS: 0
HEADACHES: 0
MEMORY LOSS: 0
SHORTNESS OF BREATH: 0
ORTHOPNEA: 0
RECTAL PAIN: 0
DEPRESSION: 0
SNORES LOUDLY: 0
PARALYSIS: 0
RESPIRATORY PAIN: 0
SEIZURES: 0
PANIC: 0
NAIL CHANGES: 0
WEIGHT GAIN: 0
HYPOTENSION: 0
LEG SWELLING: 0
LIGHT-HEADEDNESS: 0
POLYDIPSIA: 0
EXERCISE INTOLERANCE: 0
TACHYCARDIA: 0
STIFFNESS: 0
WEAKNESS: 0
HYPERTENSION: 0
JAUNDICE: 0
WHEEZING: 0
BLOOD IN STOOL: 0
DOUBLE VISION: 0
HEARTBURN: 0
HOT FLASHES: 0
SPUTUM PRODUCTION: 0
DECREASED CONCENTRATION: 0
DECREASED LIBIDO: 0
TROUBLE SWALLOWING: 0
SLEEP DISTURBANCES DUE TO BREATHING: 0
HALLUCINATIONS: 0
BOWEL INCONTINENCE: 0
EYE REDNESS: 0
SORE THROAT: 0
LOSS OF CONSCIOUSNESS: 0
MUSCLE WEAKNESS: 0
EYE WATERING: 0
FLANK PAIN: 0
COUGH: 0
NIGHT SWEATS: 0
ABDOMINAL PAIN: 0
DIFFICULTY URINATING: 0
DECREASED APPETITE: 0
TINGLING: 0

## 2019-12-17 ASSESSMENT — ANXIETY QUESTIONNAIRES
1. FEELING NERVOUS, ANXIOUS, OR ON EDGE: SEVERAL DAYS
7. FEELING AFRAID AS IF SOMETHING AWFUL MIGHT HAPPEN: NOT AT ALL
6. BECOMING EASILY ANNOYED OR IRRITABLE: NOT AT ALL
GAD7 TOTAL SCORE: 1
2. NOT BEING ABLE TO STOP OR CONTROL WORRYING: NOT AT ALL
5. BEING SO RESTLESS THAT IT IS HARD TO SIT STILL: NOT AT ALL
3. WORRYING TOO MUCH ABOUT DIFFERENT THINGS: NOT AT ALL

## 2019-12-17 ASSESSMENT — PATIENT HEALTH QUESTIONNAIRE - PHQ9
SUM OF ALL RESPONSES TO PHQ QUESTIONS 1-9: 1
5. POOR APPETITE OR OVEREATING: NOT AT ALL

## 2019-12-17 ASSESSMENT — MIFFLIN-ST. JEOR: SCORE: 1117.16

## 2019-12-17 ASSESSMENT — PAIN SCALES - GENERAL: PAINLEVEL: NO PAIN (0)

## 2019-12-17 NOTE — PATIENT INSTRUCTIONS

## 2019-12-17 NOTE — LETTER
12/17/2019       RE: Cande Curiel  [address] [place] [address]     Dear Colleague,    Thank you for referring your patient, Cande Curiel, to the [facility]  at [facility]. Please see a copy of my visit note below.     SUBJECTIVE:   CC: Cande Curiel is an 59 year old woman who presents for preventive health visit.     Healthy Habits:    Do you get at least three servings of calcium containing foods daily (dairy, green leafy vegetables, etc.)? yes    Amount of exercise or daily activities, outside of work: daily, hot yoga    Problems taking medications regularly No    Medication side effects: No    Have you had an eye exam in the past two years? yes    Do you see a dentist twice per year? yes    Do you have sleep apnea, excessive snoring or daytime drowsiness?no      -------------------------------------    Today's PHQ-2 Score:   PHQ-2 ( 1999 [employer]) 12/17/2019 12/17/2019   Q1: Little interest or pleasure in doing things 0 0   Q2: Feeling down, depressed or hopeless 0 0   PHQ-2 Score 0 0   Q1: Little interest or pleasure in doing things - -   Q2: Feeling down, depressed or hopeless - -   PHQ-2 Score - -       Abuse: Current or Past(Physical, Sexual or Emotional)- No  Do you feel safe in your environment? Yes    Have you ever done Advance Care Planning? (For example, a Health Directive, POLST, or a discussion with a medical provider or your loved ones about your wishes): Yes, patient states has an Advance Care Planning document and will bring a copy to the clinic.    Social History     Tobacco Use     Smoking status: Never Smoker     Smokeless tobacco: Never Used   Substance Use Topics     Alcohol use: Yes     Alcohol/week: 1.0 standard drinks     If you drink alcohol do you typically have >3 drinks per day or >7 drinks per week? No                     Reviewed orders with patient.  Reviewed health maintenance and updated orders accordingly -  Yes  Labs reviewed in [facility]    Mammogram Screening: Patient over age 50, mutual decision to screen reflected in health maintenance.    Pertinent mammograms are reviewed under the imaging tab.  History of abnormal Pap smear: Status post benign hysterectomy. Health Maintenance and Surgical History updated.     Reviewed and updated as needed this visit by clinical staff  Tobacco  Allergies  Meds         Reviewed and updated as needed this visit by Provider        Past Medical History:   Diagnosis Date     Abnormal uterine bleeding      Essential hypertension      Hyperlipidemia      Nephrolithiasis       Past Surgical History:   Procedure Laterality Date     C VAGINAL HYSTERECTOMY       KNEE SURGERY         ROS:  Review of Systems     Constitutional:  Negative for fever, chills, weight loss, weight gain, fatigue, decreased appetite, night sweats, recent stressors, height gain, height loss, post-operative complications, incisional pain, hallucinations, increased energy, hyperactivity and confused.   HENT:  Negative for ear pain, hearing loss, tinnitus, nosebleeds, trouble swallowing, hoarse voice, mouth sores, sore throat, ear discharge, tooth pain, gum tenderness, taste disturbance, smell disturbance, hearing aid, bleeding gums, dry mouth, sinus pain, sinus congestion and neck mass.    Eyes:  Negative for double vision, pain, redness, eye pain, decreased vision, eye watering, eye bulging, eye dryness, flashing lights, spots, floaters, strabismus, tunnel vision, jaundice and eye irritation.   Respiratory:   Negative for cough, hemoptysis, sputum production, shortness of breath, wheezing, sleep disturbances due to breathing, snores loudly, respiratory pain, dyspnea on exertion, cough disturbing sleep and postural dyspnea.    Cardiovascular:  Negative for chest pain, dyspnea on exertion, palpitations, orthopnea, claudication, leg swelling, fingers/toes turn blue, hypertension, hypotension, syncope, history of heart  murmur, chest pain on exertion, chest pain at rest, pacemaker, few scattered varicosities, leg pain, sleep disturbances due to breathing, tachycardia, light-headedness, exercise intolerance and edema.   Gastrointestinal:  Negative for heartburn, nausea, vomiting, abdominal pain, diarrhea, constipation, blood in stool, melena, rectal pain, bloating, hemorrhoids, bowel incontinence, jaundice, rectal bleeding, coffee ground emesis and change in stool.   Genitourinary:  Negative for bladder incontinence, dysuria, urgency, hematuria, flank pain, vaginal discharge, difficulty urinating, genital sores, dyspareunia, decreased libido, nocturia, voiding less frequently, arousal difficulty, abnormal vaginal bleeding, excessive menstruation, menstrual changes, hot flashes, vaginal dryness and postmenopausal bleeding.   Musculoskeletal:  Negative for myalgias, back pain, joint swelling, arthralgias, stiffness, muscle cramps, neck pain, bone pain, muscle weakness and fracture.   Skin:  Negative for nail changes, itching, poor wound healing, rash, hair changes, skin changes, acne, warts, poor wound healing, scarring, flaky skin, Raynaud's phenomenon, sensitivity to sunlight and skin thickening.   Neurological:  Negative for dizziness, tingling, tremors, speech change, seizures, loss of consciousness, weakness, light-headedness, numbness, headaches, disturbances in coordination, extremity numbness, memory loss, difficulty walking and paralysis.   Endo/Heme:  Negative for anemia, swollen glands and bruises/bleeds easily.   Psychiatric/Behavioral:  Negative for depression, hallucinations, memory loss, decreased concentration, mood swings and panic attacks.    Breast:  Negative for breast discharge, breast mass, breast pain and nipple retraction.   Endocrine:  Negative for altered temperature regulation, polyphagia, polydipsia, unwanted hair growth and change in facial hair.        OBJECTIVE:   BP (!) 149/84   Pulse 90   Ht 1.594 m  "(5' 2.75\")   Wt 56.7 kg (125 lb)   Breastfeeding No   BMI 22.32 kg/m     EXAM:  GENERAL APPEARANCE: healthy, alert and no distress  EYES: Eyes grossly normal to inspection, PERRL and conjunctivae and sclerae normal  HENT: ear canals and TM's normal, nose and mouth without ulcers or lesions, oropharynx clear and oral mucous membranes moist  NECK: no adenopathy, no asymmetry, masses, or scars and thyroid normal to palpation  RESP: lungs clear to auscultation - no rales, rhonchi or wheezes  CV: regular rate and rhythm, normal S1 S2, no S3 or S4, no murmur, click or rub, no peripheral edema and peripheral pulses strong  ABDOMEN: soft, nontender, no hepatosplenomegaly, no masses and bowel sounds normal  MS: no musculoskeletal defects are noted and gait is age appropriate without ataxia  SKIN: no suspicious lesions or rashes  NEURO: Normal strength and tone, sensory exam grossly normal, mentation intact and speech normal  PSYCH: mentation appears normal and affect normal/bright    Diagnostic Test Results:  Labs reviewed in Epic    ASSESSMENT/PLAN:   1. Routine general medical examination at a health care facility  Patient was advised on vaccines and age-appropriate cancer screening. Discussed screening for diabetes and hyperlipidemia.   - Basic Metabolic Panel  - Lipid Profile    COUNSELING:   Reviewed preventive health counseling, as reflected in patient instructions       Regular exercise       Healthy diet/nutrition    Estimated body mass index is 22.32 kg/m  as calculated from the following:    Height as of this encounter: 1.594 m (5' 2.75\").    Weight as of this encounter: 56.7 kg (125 lb).         reports that she has never smoked. She has never used smokeless tobacco.      Counseling Resources:  ATP IV Guidelines  Pooled Cohorts Equation Calculator  Breast Cancer Risk Calculator  FRAX Risk Assessment  ICSI Preventive Guidelines  Dietary Guidelines for Americans, 2010  USDA's MyPlate  ASA Prophylaxis  Lung CA " Screening    Analilia Wiley MD  [facility]

## 2019-12-17 NOTE — PROGRESS NOTES
SUBJECTIVE:   CC: Cande Curiel is an 59 year old woman who presents for preventive health visit.     Healthy Habits:    Do you get at least three servings of calcium containing foods daily (dairy, green leafy vegetables, etc.)? yes    Amount of exercise or daily activities, outside of work: daily, hot yoga    Problems taking medications regularly No    Medication side effects: No    Have you had an eye exam in the past two years? yes    Do you see a dentist twice per year? yes    Do you have sleep apnea, excessive snoring or daytime drowsiness?no      -------------------------------------    Today's PHQ-2 Score:   PHQ-2 ( 1999 [employer]) 12/17/2019 12/17/2019   Q1: Little interest or pleasure in doing things 0 0   Q2: Feeling down, depressed or hopeless 0 0   PHQ-2 Score 0 0   Q1: Little interest or pleasure in doing things - -   Q2: Feeling down, depressed or hopeless - -   PHQ-2 Score - -       Abuse: Current or Past(Physical, Sexual or Emotional)- No  Do you feel safe in your environment? Yes    Have you ever done Advance Care Planning? (For example, a Health Directive, POLST, or a discussion with a medical provider or your loved ones about your wishes): Yes, patient states has an Advance Care Planning document and will bring a copy to the clinic.    Social History     Tobacco Use     Smoking status: Never Smoker     Smokeless tobacco: Never Used   Substance Use Topics     Alcohol use: Yes     Alcohol/week: 1.0 standard drinks     If you drink alcohol do you typically have >3 drinks per day or >7 drinks per week? No                     Reviewed orders with patient.  Reviewed health maintenance and updated orders accordingly - Yes  Labs reviewed in EPIC    Mammogram Screening: Patient over age 50, mutual decision to screen reflected in health maintenance.    Pertinent mammograms are reviewed under the imaging tab.  History of abnormal Pap smear: Status post benign hysterectomy. Health Maintenance and Surgical  History updated.     Reviewed and updated as needed this visit by clinical staff  Tobacco  Allergies  Meds         Reviewed and updated as needed this visit by Provider        Past Medical History:   Diagnosis Date     Abnormal uterine bleeding      Essential hypertension      Hyperlipidemia      Nephrolithiasis       Past Surgical History:   Procedure Laterality Date     C VAGINAL HYSTERECTOMY       KNEE SURGERY         ROS:  Review of Systems     Constitutional:  Negative for fever, chills, weight loss, weight gain, fatigue, decreased appetite, night sweats, recent stressors, height gain, height loss, post-operative complications, incisional pain, hallucinations, increased energy, hyperactivity and confused.   HENT:  Negative for ear pain, hearing loss, tinnitus, nosebleeds, trouble swallowing, hoarse voice, mouth sores, sore throat, ear discharge, tooth pain, gum tenderness, taste disturbance, smell disturbance, hearing aid, bleeding gums, dry mouth, sinus pain, sinus congestion and neck mass.    Eyes:  Negative for double vision, pain, redness, eye pain, decreased vision, eye watering, eye bulging, eye dryness, flashing lights, spots, floaters, strabismus, tunnel vision, jaundice and eye irritation.   Respiratory:   Negative for cough, hemoptysis, sputum production, shortness of breath, wheezing, sleep disturbances due to breathing, snores loudly, respiratory pain, dyspnea on exertion, cough disturbing sleep and postural dyspnea.    Cardiovascular:  Negative for chest pain, dyspnea on exertion, palpitations, orthopnea, claudication, leg swelling, fingers/toes turn blue, hypertension, hypotension, syncope, history of heart murmur, chest pain on exertion, chest pain at rest, pacemaker, few scattered varicosities, leg pain, sleep disturbances due to breathing, tachycardia, light-headedness, exercise intolerance and edema.   Gastrointestinal:  Negative for heartburn, nausea, vomiting, abdominal pain, diarrhea,  "constipation, blood in stool, melena, rectal pain, bloating, hemorrhoids, bowel incontinence, jaundice, rectal bleeding, coffee ground emesis and change in stool.   Genitourinary:  Negative for bladder incontinence, dysuria, urgency, hematuria, flank pain, vaginal discharge, difficulty urinating, genital sores, dyspareunia, decreased libido, nocturia, voiding less frequently, arousal difficulty, abnormal vaginal bleeding, excessive menstruation, menstrual changes, hot flashes, vaginal dryness and postmenopausal bleeding.   Musculoskeletal:  Negative for myalgias, back pain, joint swelling, arthralgias, stiffness, muscle cramps, neck pain, bone pain, muscle weakness and fracture.   Skin:  Negative for nail changes, itching, poor wound healing, rash, hair changes, skin changes, acne, warts, poor wound healing, scarring, flaky skin, Raynaud's phenomenon, sensitivity to sunlight and skin thickening.   Neurological:  Negative for dizziness, tingling, tremors, speech change, seizures, loss of consciousness, weakness, light-headedness, numbness, headaches, disturbances in coordination, extremity numbness, memory loss, difficulty walking and paralysis.   Endo/Heme:  Negative for anemia, swollen glands and bruises/bleeds easily.   Psychiatric/Behavioral:  Negative for depression, hallucinations, memory loss, decreased concentration, mood swings and panic attacks.    Breast:  Negative for breast discharge, breast mass, breast pain and nipple retraction.   Endocrine:  Negative for altered temperature regulation, polyphagia, polydipsia, unwanted hair growth and change in facial hair.        OBJECTIVE:   BP (!) 149/84   Pulse 90   Ht 1.594 m (5' 2.75\")   Wt 56.7 kg (125 lb)   Breastfeeding No   BMI 22.32 kg/m    EXAM:  GENERAL APPEARANCE: healthy, alert and no distress  EYES: Eyes grossly normal to inspection, PERRL and conjunctivae and sclerae normal  HENT: ear canals and TM's normal, nose and mouth without ulcers or " "lesions, oropharynx clear and oral mucous membranes moist  NECK: no adenopathy, no asymmetry, masses, or scars and thyroid normal to palpation  RESP: lungs clear to auscultation - no rales, rhonchi or wheezes  CV: regular rate and rhythm, normal S1 S2, no S3 or S4, no murmur, click or rub, no peripheral edema and peripheral pulses strong  ABDOMEN: soft, nontender, no hepatosplenomegaly, no masses and bowel sounds normal  MS: no musculoskeletal defects are noted and gait is age appropriate without ataxia  SKIN: no suspicious lesions or rashes  NEURO: Normal strength and tone, sensory exam grossly normal, mentation intact and speech normal  PSYCH: mentation appears normal and affect normal/bright    Diagnostic Test Results:  Labs reviewed in Epic    ASSESSMENT/PLAN:   1. Routine general medical examination at a health care facility  Patient was advised on vaccines and age-appropriate cancer screening. Discussed screening for diabetes and hyperlipidemia.   - Basic Metabolic Panel  - Lipid Profile    COUNSELING:   Reviewed preventive health counseling, as reflected in patient instructions       Regular exercise       Healthy diet/nutrition    Estimated body mass index is 22.32 kg/m  as calculated from the following:    Height as of this encounter: 1.594 m (5' 2.75\").    Weight as of this encounter: 56.7 kg (125 lb).         reports that she has never smoked. She has never used smokeless tobacco.      Counseling Resources:  ATP IV Guidelines  Pooled Cohorts Equation Calculator  Breast Cancer Risk Calculator  FRAX Risk Assessment  ICSI Preventive Guidelines  Dietary Guidelines for Americans, 2010  USDA's MyPlate  ASA Prophylaxis  Lung CA Screening    Analilia Wiley MD  [facility]   "

## 2019-12-18 ENCOUNTER — ANCILLARY PROCEDURE (OUTPATIENT)
Dept: MAMMOGRAPHY | Facility: CLINIC | Age: 59
End: 2019-12-18
Attending: FAMILY MEDICINE
Payer: COMMERCIAL

## 2019-12-18 DIAGNOSIS — Z12.31 VISIT FOR SCREENING MAMMOGRAM: ICD-10-CM

## 2019-12-18 ASSESSMENT — ANXIETY QUESTIONNAIRES: GAD7 TOTAL SCORE: 1

## 2019-12-23 ENCOUNTER — OFFICE VISIT (OUTPATIENT)
Dept: DERMATOLOGY | Facility: CLINIC | Age: 59
End: 2019-12-23
Payer: COMMERCIAL

## 2019-12-23 VITALS — SYSTOLIC BLOOD PRESSURE: 163 MMHG | HEART RATE: 111 BPM | DIASTOLIC BLOOD PRESSURE: 82 MMHG

## 2019-12-23 DIAGNOSIS — Z12.83 SKIN EXAM FOR MALIGNANT NEOPLASM: ICD-10-CM

## 2019-12-23 DIAGNOSIS — L82.1 SEBORRHEIC KERATOSIS: ICD-10-CM

## 2019-12-23 DIAGNOSIS — Z85.828 HISTORY OF SKIN CANCER: ICD-10-CM

## 2019-12-23 DIAGNOSIS — L57.0 ACTINIC KERATOSIS: Primary | ICD-10-CM

## 2019-12-23 ASSESSMENT — PAIN SCALES - GENERAL: PAINLEVEL: NO PAIN (0)

## 2019-12-23 NOTE — PROGRESS NOTES
"  [facility] Dermatology Note    Dermatology Problem List:  1. Actinic keratosis of R forehead, cryotherapy today      2. Irritated lesion on mid R lower back  - monitor clinically      3. Seborrheic keratosis, non irritated   Discussed benign nature of lesions. Provided reassurance    4.? History of nonmelanoma skin cancer. Possible SCC? Will need to obtain records from previous sites  - Sun precaution was advised including the use of sun screens of SPF 30 or higher, sun protective clothing, and avoidance of tanning beds.    5. Elevated blood pressure reading - follow-up with primary care, reported to have \"white coat syndrome\"        Encounter Date: Dec 23, 2019    CC:   Chief Complaint   Patient presents with     Skin Check     Cande is here for a skin check - has a couple spots they are concerned about and has family hx of melanoma         History of Present Illness:  Ms. Cande Curiel is a 59 year old female with PMH of HTN who presents as self referral for evaluation of skin and establishing derm care. Previously lived in several cities, most recently [place] and then [facility] as both she and  are part of . Has had h/o concerning derm lesions, for which pt states ?SCC to R plantar foot and R extensor forearm, most recently around 2009.     Today, has 3 concerning lesions she'd like to evaluate. 2 on L neck and 1 pink patch on anterior R scalp. Uses spf 15 daily and spf 50 when in sun.     Doing well otherwise. No other concerns today.    Past Medical History:   Patient Active Problem List   Diagnosis     Pure hypercholesterolemia     Benign essential hypertension     S/P abdominal hysterectomy and left salpingo-oophorectomy     Past Medical History:   Diagnosis Date     Abnormal uterine bleeding      Essential hypertension      Hyperlipidemia      Nephrolithiasis      Past Surgical History:   Procedure Laterality Date     C VAGINAL HYSTERECTOMY       KNEE SURGERY   "       Social History:  Patient reports that she has never smoked. She has never used smokeless tobacco. She reports current alcohol use of about 1.0 standard drinks of alcohol per week. She reports that she does not use drugs.    Family History:  Family History   Problem Relation Age of Onset     Cirrhosis Mother      Osteoporosis Mother      Melanoma Mother      Pancreatic Cancer Father        Medications:  Current Outpatient Medications   Medication Sig Dispense Refill     atorvastatin (LIPITOR) 10 MG tablet Take 1 tablet (10 mg) by mouth daily 30 tablet 11     Blood Pressure Monitoring (ADULT BLOOD PRESSURE CUFF LG) KIT 1 each daily 1 kit 0     lisinopril-hydrochlorothiazide (PRINZIDE/ZESTORETIC) 20-12.5 MG tablet Take 1 tablet by mouth daily 90 tablet 3        Allergies   Allergen Reactions     Penicillin G Rash     Sulfa Drugs Rash         Review of Systems:  -As per HPI  -Constitutional: Otherwise feeling well today, in usual state of health.  - Has 4 children: 31 eldest, youngest 22  -HEENT: Patient denies nonhealing oral sores.  -Skin: As above in HPI. No additional skin concerns.    Physical exam:  Vitals: BP (!) 163/82 (BP Location: Left arm, Patient Position: Sitting, Cuff Size: Adult Regular)   Pulse 111   GEN: This is a well developed, well-nourished female in no acute distress, in a pleasant mood.    SKIN: Total skin excluding the undergarment areas was performed. The exam included the head/face, neck, both arms, chest, back, abdomen, both legs, digits and/or nails.   -Negron skin type: II  -There is an erythematous macule with overyling adherent scale on the R forehead.  - Scalp examined and normal with good hair density. No concerning lesions.  Multiple regular brown pigmented macules and papules are identified on the torso, anteriorly and posteriorly.   There are fine lines and dyspigmentation on sun exposed areas of the face and chest.  There are several waxy stuck on tan to brown papules on  the back and upper abdomen below breasts as well as L neck.  -There are dome shaped bright red papules on the back.   - well healed scar to R extensor forearm  -No other lesions of concern on areas examined.     Impression/Plan:  2. Actinic keratosis of R forehead  - Cryotherapy procedure note: After verbal consent and discussion of risks and benefits including but no limited to dyspigmentation/scar, blister, and pain, site was treated with 1-2mm freeze border for 2 cycles with liquid nitrogen. Post cryotherapy instructions were provided., Sun precaution was advised including the use of sun screens of SPF 30 or higher, sun protective clothing, and avoidance of tanning beds.    2. Possible Actinic keratosis on mid R lower back  - lesion appears irritated perhaps from scratching   - We will clinically monitor this area.      3. Seborrheic keratosis, non irritated  - Seborrheic keratosis: Discussed benign nature of lesions. Provided reassurance    4.? History of nonmelanoma skin cancer. Possible SCC? Will need to obtain records from previous sites  - Sun precaution was advised including the use of sun screens of SPF 30 or higher, sun protective clothing, and avoidance of tanning beds.  - GAUTAM to be completed today    CC Referred Self, MD  No address on file on close of this encounter.  Follow-up in 6 months, earlier for new or changing lesions.      staffed the patient.    Staff Involved:  Resident(Stephen)/Staff    Patient was seen and examined with the  Internal medicine resident. I agree with the history, review of systems, physical examination, assessments and plan. The cryotherapy procedure was done together.    Karen Griffin MD  Professor and  Chair  Department of Dermatology  [facility]

## 2019-12-23 NOTE — LETTER
"12/23/2019       RE: Cande Curiel  [address] [place] [address]     Dear Colleague,    Thank you for referring your patient, Cande Curiel, to the [facility] at [facility]. Please see a copy of my visit note below.      Formerly [facility] Dermatology Note    Dermatology Problem List:  1. Actinic keratosis of R forehead, cryotherapy today      2. Irritated lesion on mid R lower back  - monitor clinically      3. Seborrheic keratosis, non irritated   Discussed benign nature of lesions. Provided reassurance    4.? History of nonmelanoma skin cancer. Possible SCC? Will need to obtain records from previous sites  - Sun precaution was advised including the use of sun screens of SPF 30 or higher, sun protective clothing, and avoidance of tanning beds.    5. Elevated blood pressure reading - follow-up with primary care, reported to have \"white coat syndrome\"        Encounter Date: Dec 23, 2019    CC:   Chief Complaint   Patient presents with     Skin Check     Cande is here for a skin check - has a couple spots they are concerned about and has family hx of melanoma         History of Present Illness:  Ms. Cande Curiel is a 59 year old female with PMH of HTN who presents as self referral for evaluation of skin and establishing derm care. Previously lived in several cities, most recently [place] and then [facility] as both she and  are part of . Has had h/o concerning derm lesions, for which pt states ?SCC to R plantar foot and R extensor forearm, most recently around 2009.     Today, has 3 concerning lesions she'd like to evaluate. 2 on L neck and 1 pink patch on anterior R scalp. Uses spf 15 daily and spf 50 when in sun.     Doing well otherwise. No other concerns today.    Past Medical History:   Patient Active Problem List   Diagnosis     Pure hypercholesterolemia     Benign essential hypertension     S/P abdominal hysterectomy and " left salpingo-oophorectomy     Past Medical History:   Diagnosis Date     Abnormal uterine bleeding      Essential hypertension      Hyperlipidemia      Nephrolithiasis      Past Surgical History:   Procedure Laterality Date     C VAGINAL HYSTERECTOMY       KNEE SURGERY         Social History:  Patient reports that she has never smoked. She has never used smokeless tobacco. She reports current alcohol use of about 1.0 standard drinks of alcohol per week. She reports that she does not use drugs.    Family History:  Family History   Problem Relation Age of Onset     Cirrhosis Mother      Osteoporosis Mother      Melanoma Mother      Pancreatic Cancer Father        Medications:  Current Outpatient Medications   Medication Sig Dispense Refill     atorvastatin (LIPITOR) 10 MG tablet Take 1 tablet (10 mg) by mouth daily 30 tablet 11     Blood Pressure Monitoring (ADULT BLOOD PRESSURE CUFF LG) KIT 1 each daily 1 kit 0     lisinopril-hydrochlorothiazide (PRINZIDE/ZESTORETIC) 20-12.5 MG tablet Take 1 tablet by mouth daily 90 tablet 3        Allergies   Allergen Reactions     Penicillin G Rash     Sulfa Drugs Rash         Review of Systems:  -As per HPI  -Constitutional: Otherwise feeling well today, in usual state of health.  - Has 4 children: 31 eldest, youngest 22  -HEENT: Patient denies nonhealing oral sores.  -Skin: As above in HPI. No additional skin concerns.    Physical exam:  Vitals: BP (!) 163/82 (BP Location: Left arm, Patient Position: Sitting, Cuff Size: Adult Regular)   Pulse 111   GEN: This is a well developed, well-nourished female in no acute distress, in a pleasant mood.    SKIN: Total skin excluding the undergarment areas was performed. The exam included the head/face, neck, both arms, chest, back, abdomen, both legs, digits and/or nails.   -Negron skin type: II  -There is an erythematous macule with overyling adherent scale on the R forehead.  - Scalp examined and normal with good hair density. No  concerning lesions.  Multiple regular brown pigmented macules and papules are identified on the torso, anteriorly and posteriorly.   There are fine lines and dyspigmentation on sun exposed areas of the face and chest.  There are several waxy stuck on tan to brown papules on the back and upper abdomen below breasts as well as L neck.  -There are dome shaped bright red papules on the back.   - well healed scar to R extensor forearm  -No other lesions of concern on areas examined.     Impression/Plan:  2. Actinic keratosis of R forehead  - Cryotherapy procedure note: After verbal consent and discussion of risks and benefits including but no limited to dyspigmentation/scar, blister, and pain, site was treated with 1-2mm freeze border for 2 cycles with liquid nitrogen. Post cryotherapy instructions were provided., Sun precaution was advised including the use of sun screens of SPF 30 or higher, sun protective clothing, and avoidance of tanning beds.    2. Possible Actinic keratosis on mid R lower back  - lesion appears irritated perhaps from scratching   - We will clinically monitor this area.      3. Seborrheic keratosis, non irritated  - Seborrheic keratosis: Discussed benign nature of lesions. Provided reassurance    4.? History of nonmelanoma skin cancer. Possible SCC? Will need to obtain records from previous sites  - Sun precaution was advised including the use of sun screens of SPF 30 or higher, sun protective clothing, and avoidance of tanning beds.  - GAUTAM to be completed today    CC Referred Self, MD  No address on file on close of this encounter.  Follow-up in 6 months, earlier for new or changing lesions.      staffed the patient.    Staff Involved:  Resident(Stephen)/Staff    Patient was seen and examined with the  Internal medicine resident. I agree with the history, review of systems, physical examination, assessments and plan. The cryotherapy procedure was done together.    Karen Griffin,  "MD  Professor and  Chair  Department of Dermatology  [facility]        See completed note done with internal medicine resident, Dr. Mitchell.         GAUTAM faxed to [facility] Dermatology Note    Dermatology Problem List:  3. Actinic keratosis of R forehead, cryotherapy today      2. Irritated lesion on mid R lower back  - monitor clinically      3. Seborrheic keratosis, non irritated   Discussed benign nature of lesions. Provided reassurance    4.? History of nonmelanoma skin cancer. Possible SCC? Will need to obtain records from previous sites  - Sun precaution was advised including the use of sun screens of SPF 30 or higher, sun protective clothing, and avoidance of tanning beds.    5. Elevated blood pressure reading - follow-up with primary care, reported to have \"white coat syndrome\"        Encounter Date: Dec 23, 2019    CC:   Chief Complaint   Patient presents with     Skin Check     Cande is here for a skin check - has a couple spots they are concerned about and has family hx of melanoma         History of Present Illness:  Ms. Cande Curiel is a 59 year old female with PMH of HTN who presents as self referral for evaluation of skin and establishing derm care. Previously lived in several cities, most recently [place] and then [facility] as both she and  are part of . Has had h/o concerning derm lesions, for which pt states ?SCC to R plantar foot and R extensor forearm, most recently around 2009.     Today, has 3 concerning lesions she'd like to evaluate. 2 on L neck and 1 pink patch on anterior R scalp. Uses spf 15 daily and spf 50 when in sun.     Doing well otherwise. No other concerns today.    Past Medical History:   Patient Active Problem List   Diagnosis     Pure hypercholesterolemia     Benign essential hypertension     S/P abdominal hysterectomy and left salpingo-oophorectomy     Past Medical History:   Diagnosis " Date     Abnormal uterine bleeding      Essential hypertension      Hyperlipidemia      Nephrolithiasis      Past Surgical History:   Procedure Laterality Date     C VAGINAL HYSTERECTOMY       KNEE SURGERY         Social History:  Patient reports that she has never smoked. She has never used smokeless tobacco. She reports current alcohol use of about 1.0 standard drinks of alcohol per week. She reports that she does not use drugs.    Family History:  Family History   Problem Relation Age of Onset     Cirrhosis Mother      Osteoporosis Mother      Melanoma Mother      Pancreatic Cancer Father        Medications:  Current Outpatient Medications   Medication Sig Dispense Refill     atorvastatin (LIPITOR) 10 MG tablet Take 1 tablet (10 mg) by mouth daily 30 tablet 11     Blood Pressure Monitoring (ADULT BLOOD PRESSURE CUFF LG) KIT 1 each daily 1 kit 0     lisinopril-hydrochlorothiazide (PRINZIDE/ZESTORETIC) 20-12.5 MG tablet Take 1 tablet by mouth daily 90 tablet 3        Allergies   Allergen Reactions     Penicillin G Rash     Sulfa Drugs Rash         Review of Systems:  -As per HPI  -Constitutional: Otherwise feeling well today, in usual state of health.  - Has 4 children: 31 eldest, youngest 22  -HEENT: Patient denies nonhealing oral sores.  -Skin: As above in HPI. No additional skin concerns.    Physical exam:  Vitals: BP (!) 163/82 (BP Location: Left arm, Patient Position: Sitting, Cuff Size: Adult Regular)   Pulse 111   GEN: This is a well developed, well-nourished female in no acute distress, in a pleasant mood.    SKIN: Total skin excluding the undergarment areas was performed. The exam included the head/face, neck, both arms, chest, back, abdomen, both legs, digits and/or nails.   -Negron skin type: II  -There is an erythematous macule with overyling adherent scale on the R forehead.  - Scalp examined and normal with good hair density. No concerning lesions.  Multiple regular brown pigmented macules and  papules are identified on the torso, anteriorly and posteriorly.   There are fine lines and dyspigmentation on sun exposed areas of the face and chest.  There are several waxy stuck on tan to brown papules on the back and upper abdomen below breasts as well as L neck.  -There are dome shaped bright red papules on the back.   - well healed scar to R extensor forearm  -No other lesions of concern on areas examined.     Impression/Plan:  4. Actinic keratosis of R forehead  - Cryotherapy procedure note: After verbal consent and discussion of risks and benefits including but no limited to dyspigmentation/scar, blister, and pain, site was treated with 1-2mm freeze border for 2 cycles with liquid nitrogen. Post cryotherapy instructions were provided., Sun precaution was advised including the use of sun screens of SPF 30 or higher, sun protective clothing, and avoidance of tanning beds.    2. Possible Actinic keratosis on mid R lower back  - lesion appears irritated perhaps from scratching   - We will clinically monitor this area.      3. Seborrheic keratosis, non irritated  - Seborrheic keratosis: Discussed benign nature of lesions. Provided reassurance    4.? History of nonmelanoma skin cancer. Possible SCC? Will need to obtain records from previous sites  - Sun precaution was advised including the use of sun screens of SPF 30 or higher, sun protective clothing, and avoidance of tanning beds.  - GAUTAM to be completed today    CC Referred Self, MD  No address on file on close of this encounter.  Follow-up in 6 months, earlier for new or changing lesions.      staffed the patient.    Staff Involved:  Resident(Stephen)/Staff    Patient was seen and examined with the  Internal medicine resident. I agree with the history, review of systems, physical examination, assessments and plan. The cryotherapy procedure was done together.    Karen Griffin MD  Professor and  Chair  Department of Dermatology  [employer]   [place]

## 2019-12-24 NOTE — PROGRESS NOTES
"  [facility] Dermatology Note    Dermatology Problem List:  1. Actinic keratosis of R forehead, cryotherapy today      2. Irritated lesion on mid R lower back  - monitor clinically      3. Seborrheic keratosis, non irritated   Discussed benign nature of lesions. Provided reassurance    4.? History of nonmelanoma skin cancer. Possible SCC? Will need to obtain records from previous sites  - Sun precaution was advised including the use of sun screens of SPF 30 or higher, sun protective clothing, and avoidance of tanning beds.    5. Elevated blood pressure reading - follow-up with primary care, reported to have \"white coat syndrome\"        Encounter Date: Dec 23, 2019    CC:   Chief Complaint   Patient presents with     Skin Check     Cande is here for a skin check - has a couple spots they are concerned about and has family hx of melanoma         History of Present Illness:  Ms. Cande Curiel is a 59 year old female with PMH of HTN who presents as self referral for evaluation of skin and establishing derm care. Previously lived in several cities, most recently [place] and then [facility].. as both she and  are part of . Has had h/o concerning derm lesions, for which pt states ?SCC to R plantar foot and R extensor forearm, most recently around 2009.     Today, has 3 concerning lesions she'd like to evaluate. 2 on L neck and 1 pink patch on anterior R scalp. Uses spf 15 daily and spf 50 when in sun.     Doing well otherwise. No other concerns today.    Past Medical History:   Patient Active Problem List   Diagnosis     Pure hypercholesterolemia     Benign essential hypertension     S/P abdominal hysterectomy and left salpingo-oophorectomy     Past Medical History:   Diagnosis Date     Abnormal uterine bleeding      Essential hypertension      Hyperlipidemia      Nephrolithiasis      Past Surgical History:   Procedure Laterality Date     C VAGINAL HYSTERECTOMY       KNEE SURGERY   "       Social History:  Patient reports that she has never smoked. She has never used smokeless tobacco. She reports current alcohol use of about 1.0 standard drinks of alcohol per week. She reports that she does not use drugs.    Family History:  Family History   Problem Relation Age of Onset     Cirrhosis Mother      Osteoporosis Mother      Melanoma Mother      Pancreatic Cancer Father        Medications:  Current Outpatient Medications   Medication Sig Dispense Refill     atorvastatin (LIPITOR) 10 MG tablet Take 1 tablet (10 mg) by mouth daily 30 tablet 11     Blood Pressure Monitoring (ADULT BLOOD PRESSURE CUFF LG) KIT 1 each daily 1 kit 0     lisinopril-hydrochlorothiazide (PRINZIDE/ZESTORETIC) 20-12.5 MG tablet Take 1 tablet by mouth daily 90 tablet 3        Allergies   Allergen Reactions     Penicillin G Rash     Sulfa Drugs Rash         Review of Systems:  -As per HPI  -Constitutional: Otherwise feeling well today, in usual state of health.  - Has 4 children: 31 eldest, youngest 22  -HEENT: Patient denies nonhealing oral sores.  -Skin: As above in HPI. No additional skin concerns.    Physical exam:  Vitals: BP (!) 163/82 (BP Location: Left arm, Patient Position: Sitting, Cuff Size: Adult Regular)   Pulse 111   GEN: This is a well developed, well-nourished female in no acute distress, in a pleasant mood.    SKIN: Total skin excluding the undergarment areas was performed. The exam included the head/face, neck, both arms, chest, back, abdomen, both legs, digits and/or nails.   -Negron skin type: II  -There is an erythematous macule with overyling adherent scale on the R forehead.  - Scalp examined and normal with good hair density. No concerning lesions.  Multiple regular brown pigmented macules and papules are identified on the torso, anteriorly and posteriorly.   There are fine lines and dyspigmentation on sun exposed areas of the face and chest.  There are several waxy stuck on tan to brown papules on  the back and upper abdomen below breasts as well as L neck.  -There are dome shaped bright red papules on the back.   - well healed scar to R extensor forearm  -No other lesions of concern on areas examined.     Impression/Plan:  2. Actinic keratosis of R forehead  - Cryotherapy procedure note: After verbal consent and discussion of risks and benefits including but no limited to dyspigmentation/scar, blister, and pain, site was treated with 1-2mm freeze border for 2 cycles with liquid nitrogen. Post cryotherapy instructions were provided., Sun precaution was advised including the use of sun screens of SPF 30 or higher, sun protective clothing, and avoidance of tanning beds.    2. Possible Actinic keratosis on mid R lower back  - lesion appears irritated perhaps from scratching   - We will clinically monitor this area.      3. Seborrheic keratosis, non irritated  - Seborrheic keratosis: Discussed benign nature of lesions. Provided reassurance    4.? History of nonmelanoma skin cancer. Possible SCC? Will need to obtain records from previous sites  - Sun precaution was advised including the use of sun screens of SPF 30 or higher, sun protective clothing, and avoidance of tanning beds.  - GAUTAM to be completed today    CC Referred Self, MD  No address on file on close of this encounter.  Follow-up in 6 months, earlier for new or changing lesions.      staffed the patient.    Staff Involved:  Resident(Stephen)/Staff    Patient was seen and examined with the  Internal medicine resident. I agree with the history, review of systems, physical examination, assessments and plan. The cryotherapy procedure was done together.    Karen Griffin MD  Professor and  Chair  Department of Dermatology  [facility]

## 2020-02-19 ENCOUNTER — OFFICE VISIT (OUTPATIENT)
Dept: INTERNAL MEDICINE | Facility: CLINIC | Age: 60
End: 2020-02-19
Attending: INTERNAL MEDICINE
Payer: COMMERCIAL

## 2020-02-19 VITALS
BODY MASS INDEX: 22.32 KG/M2 | DIASTOLIC BLOOD PRESSURE: 84 MMHG | HEART RATE: 101 BPM | SYSTOLIC BLOOD PRESSURE: 164 MMHG | WEIGHT: 125 LBS

## 2020-02-19 DIAGNOSIS — I10 BENIGN ESSENTIAL HYPERTENSION: Primary | ICD-10-CM

## 2020-02-19 LAB
ANION GAP SERPL CALCULATED.3IONS-SCNC: 6 MMOL/L (ref 3–14)
BUN SERPL-MCNC: 19 MG/DL (ref 7–30)
CALCIUM SERPL-MCNC: 8.9 MG/DL (ref 8.5–10.1)
CHLORIDE SERPL-SCNC: 104 MMOL/L (ref 94–109)
CHOLEST SERPL-MCNC: 193 MG/DL
CO2 SERPL-SCNC: 28 MMOL/L (ref 20–32)
CREAT SERPL-MCNC: 0.63 MG/DL (ref 0.52–1.04)
GFR SERPL CREATININE-BSD FRML MDRD: >90 ML/MIN/{1.73_M2}
GLUCOSE SERPL-MCNC: 102 MG/DL (ref 70–99)
HBA1C MFR BLD: 5.2 % (ref 0–5.6)
HDLC SERPL-MCNC: 75 MG/DL
LDLC SERPL CALC-MCNC: 98 MG/DL
NONHDLC SERPL-MCNC: 118 MG/DL
POTASSIUM SERPL-SCNC: 3.8 MMOL/L (ref 3.4–5.3)
SODIUM SERPL-SCNC: 138 MMOL/L (ref 133–144)
TRIGL SERPL-MCNC: 99 MG/DL

## 2020-02-19 PROCEDURE — 80061 LIPID PANEL: CPT | Performed by: INTERNAL MEDICINE

## 2020-02-19 PROCEDURE — 83036 HEMOGLOBIN GLYCOSYLATED A1C: CPT | Performed by: INTERNAL MEDICINE

## 2020-02-19 PROCEDURE — 36415 COLL VENOUS BLD VENIPUNCTURE: CPT | Performed by: INTERNAL MEDICINE

## 2020-02-19 PROCEDURE — G0463 HOSPITAL OUTPT CLINIC VISIT: HCPCS | Mod: ZF

## 2020-02-19 PROCEDURE — 80048 BASIC METABOLIC PNL TOTAL CA: CPT | Performed by: INTERNAL MEDICINE

## 2020-02-19 ASSESSMENT — ANXIETY QUESTIONNAIRES
7. FEELING AFRAID AS IF SOMETHING AWFUL MIGHT HAPPEN: NOT AT ALL
3. WORRYING TOO MUCH ABOUT DIFFERENT THINGS: NOT AT ALL
6. BECOMING EASILY ANNOYED OR IRRITABLE: NOT AT ALL
GAD7 TOTAL SCORE: 2
2. NOT BEING ABLE TO STOP OR CONTROL WORRYING: SEVERAL DAYS
1. FEELING NERVOUS, ANXIOUS, OR ON EDGE: SEVERAL DAYS
5. BEING SO RESTLESS THAT IT IS HARD TO SIT STILL: NOT AT ALL

## 2020-02-19 ASSESSMENT — PATIENT HEALTH QUESTIONNAIRE - PHQ9
5. POOR APPETITE OR OVEREATING: NOT AT ALL
SUM OF ALL RESPONSES TO PHQ QUESTIONS 1-9: 0

## 2020-02-19 NOTE — PROGRESS NOTES
HPI  Patient is here for follow up on hypertension. She states that her home blood pressure values have been within acceptable range. SHe is concerned about overtreatment of hypertension. She denies side effects of atorvastatin therapy.     Review of Systems     Constitutional:  Negative for fever, chills and fatigue.   HENT:  Negative for dry mouth and sinus congestion.    Respiratory:   Negative for cough and dyspnea on exertion.    Cardiovascular:  Negative for chest pain, dyspnea on exertion and edema.   Gastrointestinal:  Negative for nausea, abdominal pain, diarrhea, constipation and melena.   Musculoskeletal:  Negative for back pain.   Skin:  Negative for itching and rash.   Endo/Heme:  Negative for anemia, swollen glands and bruises/bleeds easily.   Psychiatric/Behavioral:  Negative for depression, decreased concentration, mood swings and panic attacks.    Endocrine:  Negative for altered temperature regulation, polyphagia, polydipsia, unwanted hair growth and change in facial hair.    Current Outpatient Medications   Medication     atorvastatin (LIPITOR) 10 MG tablet     Blood Pressure Monitoring (ADULT BLOOD PRESSURE CUFF LG) KIT     lisinopril-hydrochlorothiazide (PRINZIDE/ZESTORETIC) 20-12.5 MG tablet     No current facility-administered medications for this visit.      Vitals:    02/19/20 0904 02/19/20 0916 02/19/20 0917 02/19/20 0918   BP: (!) 169/84 (!) 151/90 (!) 170/79 (!) 164/84   Pulse: 101 101 101 101   Weight: 56.7 kg (125 lb)          Physical Exam  Vitals signs and nursing note reviewed.   Constitutional:       Appearance: Normal appearance.   HENT:      Head: Normocephalic and atraumatic.      Mouth/Throat:      Mouth: Mucous membranes are dry.      Pharynx: Oropharynx is clear.   Cardiovascular:      Rate and Rhythm: Normal rate and regular rhythm.      Pulses: Normal pulses.   Pulmonary:      Effort: Pulmonary effort is normal.   Musculoskeletal:         General: No edema.   Neurological:       General: No focal deficit present.      Mental Status: She is alert and oriented to person, place, and time.   Psychiatric:         Mood and Affect: Mood normal.         Behavior: Behavior normal.         Thought Content: Thought content normal.         Judgment: Judgment normal.       Assessment and Plan:  Cande was seen today for follow up.    Diagnoses and all orders for this visit:    Benign essential hypertension. Patient reports that her home blood pressure readings have been within acceptable range. Discussed white coat hypertension. Recommend 24 hour blood pressure monitor to determine the degree of blood pressure control. Will follow up after completion of the monitor. Patient is in agreement with the plan.   -     24 Hour Blood Pressure Monitor - Adult; Future  -     Basic Metabolic Panel  -     Lipid Profile  -     Hgb A1c      Total time spent 15 minutes.  More than 50% of the time spent with Ms. Curiel on counseling / coordinating her care    Analilia Wiley MD

## 2020-02-19 NOTE — NURSING NOTE
Chief Complaint   Patient presents with     Follow Up     B/P check   Sharon Huffman LPN   2 = assistive person

## 2020-02-19 NOTE — LETTER
2/19/2020       RE: Cande Curiel  [address] [place] [address]     Dear Colleague,    Thank you for referring your patient, Cande Curiel, to the [facility]  at [facility]. Please see a copy of my visit note below.    HPI  Patient is here for follow up on hypertension. She states that her home blood pressure values have been within acceptable range. SHe is concerned about overtreatment of hypertension. She denies side effects of atorvastatin therapy.     Review of Systems     Constitutional:  Negative for fever, chills and fatigue.   HENT:  Negative for dry mouth and sinus congestion.    Respiratory:   Negative for cough and dyspnea on exertion.    Cardiovascular:  Negative for chest pain, dyspnea on exertion and edema.   Gastrointestinal:  Negative for nausea, abdominal pain, diarrhea, constipation and melena.   Musculoskeletal:  Negative for back pain.   Skin:  Negative for itching and rash.   Endo/Heme:  Negative for anemia, swollen glands and bruises/bleeds easily.   Psychiatric/Behavioral:  Negative for depression, decreased concentration, mood swings and panic attacks.    Endocrine:  Negative for altered temperature regulation, polyphagia, polydipsia, unwanted hair growth and change in facial hair.    Current Outpatient Medications   Medication     atorvastatin (LIPITOR) 10 MG tablet     Blood Pressure Monitoring (ADULT BLOOD PRESSURE CUFF LG) KIT     lisinopril-hydrochlorothiazide (PRINZIDE/ZESTORETIC) 20-12.5 MG tablet     No current facility-administered medications for this visit.      Vitals:    02/19/20 0904 02/19/20 0916 02/19/20 0917 02/19/20 0918   BP: (!) 169/84 (!) 151/90 (!) 170/79 (!) 164/84   Pulse: 101 101 101 101   Weight: 56.7 kg (125 lb)          Physical Exam  Vitals signs and nursing note reviewed.   Constitutional:       Appearance: Normal appearance.   HENT:      Head: Normocephalic and atraumatic.       Mouth/Throat:      Mouth: Mucous membranes are dry.      Pharynx: Oropharynx is clear.   Cardiovascular:      Rate and Rhythm: Normal rate and regular rhythm.      Pulses: Normal pulses.   Pulmonary:      Effort: Pulmonary effort is normal.   Musculoskeletal:         General: No edema.   Neurological:      General: No focal deficit present.      Mental Status: She is alert and oriented to person, place, and time.   Psychiatric:         Mood and Affect: Mood normal.         Behavior: Behavior normal.         Thought Content: Thought content normal.         Judgment: Judgment normal.       Assessment and Plan:  Cande was seen today for follow up.    Diagnoses and all orders for this visit:    Benign essential hypertension. Patient reports that her home blood pressure readings have been within acceptable range. Discussed white coat hypertension. Recommend 24 hour blood pressure monitor to determine the degree of blood pressure control. Will follow up after completion of the monitor. Patient is in agreement with the plan.   -     24 Hour Blood Pressure Monitor - Adult; Future  -     Basic Metabolic Panel  -     Lipid Profile  -     Hgb A1c      Total time spent 15 minutes.  More than 50% of the time spent with Ms. Curiel on counseling / coordinating her care    Analilia Wiley MD

## 2020-02-20 ASSESSMENT — ANXIETY QUESTIONNAIRES: GAD7 TOTAL SCORE: 2

## 2020-02-23 ASSESSMENT — ENCOUNTER SYMPTOMS
ABDOMINAL PAIN: 0
NERVOUS/ANXIOUS: 0
ALTERED TEMPERATURE REGULATION: 0
CONSTIPATION: 0
DYSPNEA ON EXERTION: 0
BRUISES/BLEEDS EASILY: 0
DIARRHEA: 0
FEVER: 0
PANIC: 0
SWOLLEN GLANDS: 0
INSOMNIA: 0
BACK PAIN: 0
NAUSEA: 0
POLYDIPSIA: 0
POLYPHAGIA: 0
COUGH: 0
CHILLS: 0
DECREASED CONCENTRATION: 0
DEPRESSION: 0
SINUS CONGESTION: 0
FATIGUE: 0

## 2020-03-11 ENCOUNTER — HOSPITAL ENCOUNTER (OUTPATIENT)
Dept: CARDIOLOGY | Facility: CLINIC | Age: 60
Discharge: HOME | End: 2020-03-11
Attending: INTERNAL MEDICINE | Admitting: INTERNAL MEDICINE
Payer: COMMERCIAL

## 2020-03-11 DIAGNOSIS — I10 BENIGN ESSENTIAL HYPERTENSION: ICD-10-CM

## 2020-03-11 PROCEDURE — 93790 AMBL BP MNTR W/SW I&R: CPT | Performed by: INTERNAL MEDICINE

## 2020-03-11 PROCEDURE — 93786 AMBL BP MNTR W/SW REC ONLY: CPT

## 2020-12-01 ENCOUNTER — MYC REFILL (OUTPATIENT)
Dept: INTERNAL MEDICINE | Facility: CLINIC | Age: 60
End: 2020-12-01

## 2020-12-01 DIAGNOSIS — I10 BENIGN ESSENTIAL HYPERTENSION: ICD-10-CM

## 2020-12-01 DIAGNOSIS — E78.00 PURE HYPERCHOLESTEROLEMIA: ICD-10-CM

## 2020-12-01 RX ORDER — LISINOPRIL AND HYDROCHLOROTHIAZIDE 12.5; 2 MG/1; MG/1
1 TABLET ORAL DAILY
Qty: 30 TABLET | Refills: 0 | Status: SHIPPED | OUTPATIENT
Start: 2020-12-01 | End: 2020-12-31

## 2020-12-01 RX ORDER — ATORVASTATIN CALCIUM 10 MG/1
10 TABLET, FILM COATED ORAL DAILY
Qty: 30 TABLET | Refills: 0 | Status: SHIPPED | OUTPATIENT
Start: 2020-12-01 | End: 2020-12-31

## 2020-12-01 NOTE — TELEPHONE ENCOUNTER
Refill requests received for lisinopril-hctz. Patient has upcoming annual exam scheduled with Dr. Wiley 12/22/2020. Short term supply sent to get patient by until clinic visit. [employer] message sent.

## 2020-12-01 NOTE — TELEPHONE ENCOUNTER
Refill requests received for atorvastatin. Patient has upcoming annual exam scheduled with Dr. Wiley 12/22/2020. Short term supply sent to get patient by until clinic visit. [employer] message sent.

## 2020-12-22 ENCOUNTER — OFFICE VISIT (OUTPATIENT)
Dept: INTERNAL MEDICINE | Facility: CLINIC | Age: 60
End: 2020-12-22
Attending: INTERNAL MEDICINE
Payer: COMMERCIAL

## 2020-12-22 VITALS
BODY MASS INDEX: 22.86 KG/M2 | SYSTOLIC BLOOD PRESSURE: 164 MMHG | HEART RATE: 108 BPM | HEIGHT: 63 IN | WEIGHT: 129 LBS | DIASTOLIC BLOOD PRESSURE: 85 MMHG

## 2020-12-22 DIAGNOSIS — Z00.00 PREVENTATIVE HEALTH CARE: Primary | ICD-10-CM

## 2020-12-22 DIAGNOSIS — Z12.31 VISIT FOR SCREENING MAMMOGRAM: ICD-10-CM

## 2020-12-22 PROCEDURE — 99396 PREV VISIT EST AGE 40-64: CPT | Performed by: INTERNAL MEDICINE

## 2020-12-22 PROCEDURE — 77063 BREAST TOMOSYNTHESIS BI: CPT

## 2020-12-22 PROCEDURE — 77067 SCR MAMMO BI INCL CAD: CPT

## 2020-12-22 ASSESSMENT — MIFFLIN-ST. JEOR: SCORE: 1130.3

## 2020-12-22 ASSESSMENT — PAIN SCALES - GENERAL: PAINLEVEL: NO PAIN (0)

## 2020-12-22 NOTE — LETTER
12/22/2020       RE: Cande Curiel  [address] [place] [address]     Dear Colleague,    Thank you for referring your patient, Cande Cuirel, to the [facility] [place] at [facility]. Please see a copy of my visit note below.     SUBJECTIVE:   CC: Cande Curiel is an 60 year old woman who presents for preventive health visit.       Patient has been advised of split billing requirements and indicates understanding: Yes  Healthy Habits:    Do you get at least three servings of calcium containing foods daily (dairy, green leafy vegetables, etc.)? yes    Amount of exercise or daily activities, outside of work: daily, 60 minutes    Problems taking medications regularly No    Medication side effects: No    Have you had an eye exam in the past two years? yes    Do you see a dentist twice per year? yes    Do you have sleep apnea, excessive snoring or daytime drowsiness?no      -------------------------------------    Today's PHQ-2 Score:   PHQ-2 ( 1999 [employer]) 2/19/2020 12/17/2019   Q1: Little interest or pleasure in doing things 0 0   Q2: Feeling down, depressed or hopeless 0 0   PHQ-2 Score 0 0   Q1: Little interest or pleasure in doing things - -   Q2: Feeling down, depressed or hopeless - -   PHQ-2 Score - -       Abuse: Current or Past(Physical, Sexual or Emotional)- No  Do you feel safe in your environment? Yes        Social History     Tobacco Use     Smoking status: Never Smoker     Smokeless tobacco: Never Used   Substance Use Topics     Alcohol use: Yes     Alcohol/week: 1.0 standard drinks     If you drink alcohol do you typically have >3 drinks per day or >7 drinks per week? No                     Reviewed orders with patient.  Reviewed health maintenance and updated orders accordingly - Yes  Labs reviewed in EPIC    Mammogram Screening: Patient over age 50, mutual decision to screen reflected in health maintenance.    Pertinent  mammograms are reviewed under the imaging tab.  History of abnormal Pap smear: NO - age 30-65 PAP every 5 years with negative HPV co-testing recommended  Last 3 Pap and HPV Results:       Reviewed and updated as needed this visit by clinical staff  Tobacco  Allergies  Meds              Reviewed and updated as needed this visit by Provider                Past Medical History:   Diagnosis Date     Abnormal uterine bleeding      Essential hypertension      Hyperlipidemia      Nephrolithiasis       Past Surgical History:   Procedure Laterality Date     C VAGINAL HYSTERECTOMY       KNEE SURGERY         ROS:  Review of Systems     Constitutional:  Negative for fever, chills, weight loss, weight gain, fatigue, decreased appetite, night sweats, recent stressors, height gain, height loss, post-operative complications, incisional pain, hallucinations, increased energy, hyperactivity and confused.   HENT:  Negative for ear pain, hearing loss, tinnitus, nosebleeds, trouble swallowing, hoarse voice, mouth sores, sore throat, ear discharge, tooth pain, gum tenderness, taste disturbance, smell disturbance, hearing aid, bleeding gums, dry mouth, sinus pain, sinus congestion and neck mass.    Eyes:  Negative for double vision, pain, redness, eye pain, decreased vision, eye watering, eye bulging, eye dryness, flashing lights, spots, floaters, strabismus, tunnel vision, jaundice and eye irritation.   Respiratory:   Negative for cough, hemoptysis, sputum production, shortness of breath, wheezing, sleep disturbances due to breathing, snores loudly, respiratory pain, dyspnea on exertion, cough disturbing sleep and postural dyspnea.    Cardiovascular:  Negative for chest pain, dyspnea on exertion, palpitations, orthopnea, claudication, leg swelling, fingers/toes turn blue, hypertension, hypotension, syncope, history of heart murmur, chest pain on exertion, chest pain at rest, pacemaker, few scattered varicosities, leg pain, sleep  "disturbances due to breathing, tachycardia, light-headedness, exercise intolerance and edema.   Gastrointestinal:  Negative for heartburn, nausea, vomiting, abdominal pain, diarrhea, constipation, blood in stool, melena, rectal pain, bloating, hemorrhoids, bowel incontinence, jaundice, rectal bleeding, coffee ground emesis and change in stool.   Genitourinary:  Negative for bladder incontinence, dysuria, urgency, hematuria, flank pain, vaginal discharge, difficulty urinating, genital sores, dyspareunia, decreased libido, nocturia, voiding less frequently, arousal difficulty, abnormal vaginal bleeding, excessive menstruation, menstrual changes, hot flashes, vaginal dryness and postmenopausal bleeding.   Musculoskeletal:  Negative for myalgias, back pain, joint swelling, arthralgias, stiffness, muscle cramps, neck pain, bone pain, muscle weakness and fracture.   Skin:  Negative for nail changes, itching, poor wound healing, rash, hair changes, skin changes, acne, warts, poor wound healing, scarring, flaky skin, Raynaud's phenomenon, sensitivity to sunlight and skin thickening.   Neurological:  Negative for dizziness, tingling, tremors, speech change, seizures, loss of consciousness, weakness, light-headedness, numbness, headaches, disturbances in coordination, extremity numbness, memory loss, difficulty walking and paralysis.   Endo/Heme:  Negative for anemia, swollen glands and bruises/bleeds easily.   Psychiatric/Behavioral:  Negative for depression, hallucinations, memory loss, decreased concentration, mood swings and panic attacks.    Breast:  Negative for breast discharge, breast mass, breast pain and nipple retraction.   Endocrine:  Negative for altered temperature regulation, polyphagia, polydipsia, unwanted hair growth and change in facial hair.        OBJECTIVE:   BP (!) 164/85   Pulse 108   Ht 1.594 m (5' 2.75\")   Wt 58.5 kg (129 lb)   Breastfeeding No   BMI 23.03 kg/m    EXAM:  GENERAL APPEARANCE: " "healthy, alert and no distress  EYES: Eyes grossly normal to inspection, PERRL and conjunctivae and sclerae normal  NECK: no adenopathy, no asymmetry, masses, or scars and thyroid normal to palpation  RESP: lungs clear to auscultation - no rales, rhonchi or wheezes  BREAST: normal without masses, tenderness or nipple discharge and no palpable axillary masses or adenopathy  CV: regular rate and rhythm, normal S1 S2, no S3 or S4, no murmur, click or rub, no peripheral edema and peripheral pulses strong  ABDOMEN: soft, nontender, no hepatosplenomegaly, no masses and bowel sounds normal  MS: no musculoskeletal defects are noted and gait is age appropriate without ataxia  SKIN: no suspicious lesions or rashes  NEURO: Normal strength and tone, sensory exam grossly normal, mentation intact and speech normal  PSYCH: mentation appears normal and affect normal/bright    Diagnostic Test Results:  Labs reviewed in Epic    ASSESSMENT/PLAN:   1. Preventative health care  Discussed elevated blood pressure, recommend to continue with home monitoring. Advised on lipid screening as well as screening for diabetes.   - Basic metabolic panel; Future  - Lipid Profile; Future  - TSH with free T4 reflex; Future  - Hgb A1c; Future    Patient has been advised of split billing requirements and indicates understanding: Yes  COUNSELING:   Reviewed preventive health counseling, as reflected in patient instructions       Regular exercise       Healthy diet/nutrition       Vision screening    Estimated body mass index is 23.03 kg/m  as calculated from the following:    Height as of this encounter: 1.594 m (5' 2.75\").    Weight as of this encounter: 58.5 kg (129 lb).        She reports that she has never smoked. She has never used smokeless tobacco.      Counseling Resources:  ATP IV Guidelines  Pooled Cohorts Equation Calculator  Breast Cancer Risk Calculator  BRCA-Related Cancer Risk Assessment: FHS-7 Tool  FRAX Risk Assessment  ICSI Preventive " Guidelines  Dietary Guidelines for Americans, 2010  USDA's MyPlate  ASA Prophylaxis  Lung CA Screening    Analilia Wiley MD  [facility]

## 2020-12-22 NOTE — PROGRESS NOTES
SUBJECTIVE:   CC: Cande Curiel is an 60 year old woman who presents for preventive health visit.       Patient has been advised of split billing requirements and indicates understanding: Yes  Healthy Habits:    Do you get at least three servings of calcium containing foods daily (dairy, green leafy vegetables, etc.)? yes    Amount of exercise or daily activities, outside of work: daily, 60 minutes    Problems taking medications regularly No    Medication side effects: No    Have you had an eye exam in the past two years? yes    Do you see a dentist twice per year? yes    Do you have sleep apnea, excessive snoring or daytime drowsiness?no      -------------------------------------    Today's PHQ-2 Score:   PHQ-2 ( 1999 [employer]) 2/19/2020 12/17/2019   Q1: Little interest or pleasure in doing things 0 0   Q2: Feeling down, depressed or hopeless 0 0   PHQ-2 Score 0 0   Q1: Little interest or pleasure in doing things - -   Q2: Feeling down, depressed or hopeless - -   PHQ-2 Score - -       Abuse: Current or Past(Physical, Sexual or Emotional)- No  Do you feel safe in your environment? Yes        Social History     Tobacco Use     Smoking status: Never Smoker     Smokeless tobacco: Never Used   Substance Use Topics     Alcohol use: Yes     Alcohol/week: 1.0 standard drinks     If you drink alcohol do you typically have >3 drinks per day or >7 drinks per week? No                     Reviewed orders with patient.  Reviewed health maintenance and updated orders accordingly - Yes  Labs reviewed in EPIC    Mammogram Screening: Patient over age 50, mutual decision to screen reflected in health maintenance.    Pertinent mammograms are reviewed under the imaging tab.  History of abnormal Pap smear: NO - age 30-65 PAP every 5 years with negative HPV co-testing recommended  Last 3 Pap and HPV Results:       Reviewed and updated as needed this visit by clinical staff  Tobacco  Allergies  Meds              Reviewed and updated  as needed this visit by Provider                Past Medical History:   Diagnosis Date     Abnormal uterine bleeding      Essential hypertension      Hyperlipidemia      Nephrolithiasis       Past Surgical History:   Procedure Laterality Date     C VAGINAL HYSTERECTOMY       KNEE SURGERY         ROS:  Review of Systems     Constitutional:  Negative for fever, chills, weight loss, weight gain, fatigue, decreased appetite, night sweats, recent stressors, height gain, height loss, post-operative complications, incisional pain, hallucinations, increased energy, hyperactivity and confused.   HENT:  Negative for ear pain, hearing loss, tinnitus, nosebleeds, trouble swallowing, hoarse voice, mouth sores, sore throat, ear discharge, tooth pain, gum tenderness, taste disturbance, smell disturbance, hearing aid, bleeding gums, dry mouth, sinus pain, sinus congestion and neck mass.    Eyes:  Negative for double vision, pain, redness, eye pain, decreased vision, eye watering, eye bulging, eye dryness, flashing lights, spots, floaters, strabismus, tunnel vision, jaundice and eye irritation.   Respiratory:   Negative for cough, hemoptysis, sputum production, shortness of breath, wheezing, sleep disturbances due to breathing, snores loudly, respiratory pain, dyspnea on exertion, cough disturbing sleep and postural dyspnea.    Cardiovascular:  Negative for chest pain, dyspnea on exertion, palpitations, orthopnea, claudication, leg swelling, fingers/toes turn blue, hypertension, hypotension, syncope, history of heart murmur, chest pain on exertion, chest pain at rest, pacemaker, few scattered varicosities, leg pain, sleep disturbances due to breathing, tachycardia, light-headedness, exercise intolerance and edema.   Gastrointestinal:  Negative for heartburn, nausea, vomiting, abdominal pain, diarrhea, constipation, blood in stool, melena, rectal pain, bloating, hemorrhoids, bowel incontinence, jaundice, rectal bleeding, coffee  "ground emesis and change in stool.   Genitourinary:  Negative for bladder incontinence, dysuria, urgency, hematuria, flank pain, vaginal discharge, difficulty urinating, genital sores, dyspareunia, decreased libido, nocturia, voiding less frequently, arousal difficulty, abnormal vaginal bleeding, excessive menstruation, menstrual changes, hot flashes, vaginal dryness and postmenopausal bleeding.   Musculoskeletal:  Negative for myalgias, back pain, joint swelling, arthralgias, stiffness, muscle cramps, neck pain, bone pain, muscle weakness and fracture.   Skin:  Negative for nail changes, itching, poor wound healing, rash, hair changes, skin changes, acne, warts, poor wound healing, scarring, flaky skin, Raynaud's phenomenon, sensitivity to sunlight and skin thickening.   Neurological:  Negative for dizziness, tingling, tremors, speech change, seizures, loss of consciousness, weakness, light-headedness, numbness, headaches, disturbances in coordination, extremity numbness, memory loss, difficulty walking and paralysis.   Endo/Heme:  Negative for anemia, swollen glands and bruises/bleeds easily.   Psychiatric/Behavioral:  Negative for depression, hallucinations, memory loss, decreased concentration, mood swings and panic attacks.    Breast:  Negative for breast discharge, breast mass, breast pain and nipple retraction.   Endocrine:  Negative for altered temperature regulation, polyphagia, polydipsia, unwanted hair growth and change in facial hair.        OBJECTIVE:   BP (!) 164/85   Pulse 108   Ht 1.594 m (5' 2.75\")   Wt 58.5 kg (129 lb)   Breastfeeding No   BMI 23.03 kg/m    EXAM:  GENERAL APPEARANCE: healthy, alert and no distress  EYES: Eyes grossly normal to inspection, PERRL and conjunctivae and sclerae normal  NECK: no adenopathy, no asymmetry, masses, or scars and thyroid normal to palpation  RESP: lungs clear to auscultation - no rales, rhonchi or wheezes  BREAST: normal without masses, tenderness or " "nipple discharge and no palpable axillary masses or adenopathy  CV: regular rate and rhythm, normal S1 S2, no S3 or S4, no murmur, click or rub, no peripheral edema and peripheral pulses strong  ABDOMEN: soft, nontender, no hepatosplenomegaly, no masses and bowel sounds normal  MS: no musculoskeletal defects are noted and gait is age appropriate without ataxia  SKIN: no suspicious lesions or rashes  NEURO: Normal strength and tone, sensory exam grossly normal, mentation intact and speech normal  PSYCH: mentation appears normal and affect normal/bright    Diagnostic Test Results:  Labs reviewed in Epic    ASSESSMENT/PLAN:   1. Preventative health care  Discussed elevated blood pressure, recommend to continue with home monitoring. Advised on lipid screening as well as screening for diabetes.   - Basic metabolic panel; Future  - Lipid Profile; Future  - TSH with free T4 reflex; Future  - Hgb A1c; Future    Patient has been advised of split billing requirements and indicates understanding: Yes  COUNSELING:   Reviewed preventive health counseling, as reflected in patient instructions       Regular exercise       Healthy diet/nutrition       Vision screening    Estimated body mass index is 23.03 kg/m  as calculated from the following:    Height as of this encounter: 1.594 m (5' 2.75\").    Weight as of this encounter: 58.5 kg (129 lb).        She reports that she has never smoked. She has never used smokeless tobacco.      Counseling Resources:  ATP IV Guidelines  Pooled Cohorts Equation Calculator  Breast Cancer Risk Calculator  BRCA-Related Cancer Risk Assessment: FHS-7 Tool  FRAX Risk Assessment  ICSI Preventive Guidelines  Dietary Guidelines for Americans, 2010  USDA's MyPlate  ASA Prophylaxis  Lung CA Screening    Analilia Wiley MD  [facility] [place]  "

## 2020-12-26 ASSESSMENT — ENCOUNTER SYMPTOMS
HEMATURIA: 0
DOUBLE VISION: 0
SLEEP DISTURBANCES DUE TO BREATHING: 0
POSTURAL DYSPNEA: 0
DECREASED CONCENTRATION: 0
WEIGHT GAIN: 0
SEIZURES: 0
WEIGHT LOSS: 0
WHEEZING: 0
NUMBNESS: 0
ABDOMINAL PAIN: 0
STIFFNESS: 0
TREMORS: 0
RECTAL PAIN: 0
HOARSE VOICE: 0
DIARRHEA: 0
EYE WATERING: 0
EXERCISE INTOLERANCE: 0
HYPOTENSION: 0
TASTE DISTURBANCE: 0
ORTHOPNEA: 0
POOR WOUND HEALING: 0
TACHYCARDIA: 0
SPUTUM PRODUCTION: 0
POLYDIPSIA: 0
DIFFICULTY URINATING: 0
HEARTBURN: 0
JOINT SWELLING: 0
COUGH DISTURBING SLEEP: 0
DISTURBANCES IN COORDINATION: 0
EYE IRRITATION: 0
PANIC: 0
SINUS CONGESTION: 0
CONSTIPATION: 0
DECREASED APPETITE: 0
ALTERED TEMPERATURE REGULATION: 0
RESPIRATORY PAIN: 0
EYE PAIN: 0
DECREASED LIBIDO: 0
EYE REDNESS: 0
NECK MASS: 0
BLOOD IN STOOL: 0
BOWEL INCONTINENCE: 0
COUGH: 0
MYALGIAS: 0
SMELL DISTURBANCE: 0
WEAKNESS: 0
LEG SWELLING: 0
FEVER: 0
NAIL CHANGES: 0
SORE THROAT: 0
NIGHT SWEATS: 0
DEPRESSION: 0
MUSCLE WEAKNESS: 0
FLANK PAIN: 0
VOMITING: 0
DYSURIA: 0
MUSCLE CRAMPS: 0
NERVOUS/ANXIOUS: 0
SYNCOPE: 0
HEADACHES: 0
FATIGUE: 0
PALPITATIONS: 0
BACK PAIN: 0
DIZZINESS: 0
INCREASED ENERGY: 0
ARTHRALGIAS: 0
POLYPHAGIA: 0
LEG PAIN: 0
LIGHT-HEADEDNESS: 0
SINUS PAIN: 0
SWOLLEN GLANDS: 0
HALLUCINATIONS: 0
BREAST MASS: 0
SKIN CHANGES: 0
SPEECH CHANGE: 0
PARALYSIS: 0
SNORES LOUDLY: 0
BLOATING: 0
INSOMNIA: 0
JAUNDICE: 0
HYPERTENSION: 0
CHILLS: 0
MEMORY LOSS: 0
RECTAL BLEEDING: 0
HOT FLASHES: 0
SHORTNESS OF BREATH: 0
HEMOPTYSIS: 0
EXTREMITY NUMBNESS: 0
BREAST PAIN: 0
TROUBLE SWALLOWING: 0
LOSS OF CONSCIOUSNESS: 0
TINGLING: 0
DYSPNEA ON EXERTION: 0
BRUISES/BLEEDS EASILY: 0
CLAUDICATION: 0
NECK PAIN: 0
NAUSEA: 0

## 2020-12-31 DIAGNOSIS — I10 BENIGN ESSENTIAL HYPERTENSION: ICD-10-CM

## 2020-12-31 DIAGNOSIS — E78.00 PURE HYPERCHOLESTEROLEMIA: ICD-10-CM

## 2020-12-31 RX ORDER — ATORVASTATIN CALCIUM 10 MG/1
10 TABLET, FILM COATED ORAL DAILY
Qty: 90 TABLET | Refills: 3 | Status: SHIPPED | OUTPATIENT
Start: 2020-12-31 | End: 2021-12-17

## 2020-12-31 RX ORDER — LISINOPRIL AND HYDROCHLOROTHIAZIDE 12.5; 2 MG/1; MG/1
1 TABLET ORAL DAILY
Qty: 90 TABLET | Refills: 3 | Status: SHIPPED | OUTPATIENT
Start: 2020-12-31 | End: 2022-01-10

## 2020-12-31 NOTE — TELEPHONE ENCOUNTER
Refill requests received for lisinopril and atorvastatin. Last annual exam 12/22/2020. Refills sent.

## 2021-02-25 ENCOUNTER — MYC MEDICAL ADVICE (OUTPATIENT)
Dept: INTERNAL MEDICINE | Facility: CLINIC | Age: 61
End: 2021-02-25

## 2021-02-25 DIAGNOSIS — Z00.00 PREVENTATIVE HEALTH CARE: ICD-10-CM

## 2021-02-25 LAB
ANION GAP SERPL CALCULATED.3IONS-SCNC: 6 MMOL/L (ref 3–14)
BUN SERPL-MCNC: 19 MG/DL (ref 7–30)
CALCIUM SERPL-MCNC: 10 MG/DL (ref 8.5–10.1)
CHLORIDE SERPL-SCNC: 102 MMOL/L (ref 94–109)
CHOLEST SERPL-MCNC: 197 MG/DL
CO2 SERPL-SCNC: 31 MMOL/L (ref 20–32)
CREAT SERPL-MCNC: 0.64 MG/DL (ref 0.52–1.04)
GFR SERPL CREATININE-BSD FRML MDRD: >90 ML/MIN/{1.73_M2}
GLUCOSE SERPL-MCNC: 101 MG/DL (ref 70–99)
HBA1C MFR BLD: 5.3 % (ref 0–5.6)
HDLC SERPL-MCNC: 78 MG/DL
LDLC SERPL CALC-MCNC: 103 MG/DL
NONHDLC SERPL-MCNC: 119 MG/DL
POTASSIUM SERPL-SCNC: 4.1 MMOL/L (ref 3.4–5.3)
SODIUM SERPL-SCNC: 139 MMOL/L (ref 133–144)
TRIGL SERPL-MCNC: 81 MG/DL
TSH SERPL DL<=0.005 MIU/L-ACNC: 2.26 MU/L (ref 0.4–4)

## 2021-02-25 PROCEDURE — 80061 LIPID PANEL: CPT | Performed by: INTERNAL MEDICINE

## 2021-02-25 PROCEDURE — 84443 ASSAY THYROID STIM HORMONE: CPT | Performed by: INTERNAL MEDICINE

## 2021-02-25 PROCEDURE — 36415 COLL VENOUS BLD VENIPUNCTURE: CPT | Performed by: INTERNAL MEDICINE

## 2021-02-25 PROCEDURE — 83036 HEMOGLOBIN GLYCOSYLATED A1C: CPT | Performed by: INTERNAL MEDICINE

## 2021-02-25 PROCEDURE — 80048 BASIC METABOLIC PNL TOTAL CA: CPT | Performed by: INTERNAL MEDICINE

## 2021-04-12 ENCOUNTER — IMMUNIZATION (OUTPATIENT)
Dept: NURSING | Facility: CLINIC | Age: 61
End: 2021-04-12
Payer: COMMERCIAL

## 2021-04-12 PROCEDURE — 91300 PR COVID VAC PFIZER DIL RECON 30 MCG/0.3 ML IM: CPT

## 2021-04-12 PROCEDURE — 0001A PR COVID VAC PFIZER DIL RECON 30 MCG/0.3 ML IM: CPT

## 2021-05-03 ENCOUNTER — IMMUNIZATION (OUTPATIENT)
Dept: NURSING | Facility: CLINIC | Age: 61
End: 2021-05-03
Payer: COMMERCIAL

## 2021-05-03 PROCEDURE — 91300 PR COVID VAC PFIZER DIL RECON 30 MCG/0.3 ML IM: CPT

## 2021-05-03 PROCEDURE — 0002A PR COVID VAC PFIZER DIL RECON 30 MCG/0.3 ML IM: CPT

## 2021-07-20 DIAGNOSIS — Z53.9 ERRONEOUS ENCOUNTER--DISREGARD: Primary | ICD-10-CM

## 2021-09-30 DIAGNOSIS — E78.00 PURE HYPERCHOLESTEROLEMIA: ICD-10-CM

## 2021-09-30 DIAGNOSIS — I10 BENIGN ESSENTIAL HYPERTENSION: ICD-10-CM

## 2021-10-01 RX ORDER — LISINOPRIL AND HYDROCHLOROTHIAZIDE 12.5; 2 MG/1; MG/1
1 TABLET ORAL DAILY
Qty: 90 TABLET | Refills: 3 | OUTPATIENT
Start: 2021-10-01

## 2021-10-01 RX ORDER — ATORVASTATIN CALCIUM 10 MG/1
TABLET, FILM COATED ORAL
Qty: 90 TABLET | Refills: 3 | OUTPATIENT
Start: 2021-10-01

## 2021-10-09 ENCOUNTER — HEALTH MAINTENANCE LETTER (OUTPATIENT)
Age: 61
End: 2021-10-09

## 2021-12-17 ENCOUNTER — MYC MEDICAL ADVICE (OUTPATIENT)
Dept: INTERNAL MEDICINE | Facility: CLINIC | Age: 61
End: 2021-12-17
Payer: COMMERCIAL

## 2021-12-17 DIAGNOSIS — E78.00 PURE HYPERCHOLESTEROLEMIA: ICD-10-CM

## 2021-12-17 RX ORDER — ATORVASTATIN CALCIUM 10 MG/1
10 TABLET, FILM COATED ORAL DAILY
Qty: 90 TABLET | Refills: 0 | Status: SHIPPED | OUTPATIENT
Start: 2021-12-17 | End: 2022-01-10

## 2021-12-17 NOTE — TELEPHONE ENCOUNTER
Refill request received for atorvastatin. Pt is scheduled for annual exam 1/10/2022. Short term refill sent. [employer] message sent to patient informing her this was done.

## 2021-12-23 ENCOUNTER — ANCILLARY PROCEDURE (OUTPATIENT)
Dept: MAMMOGRAPHY | Facility: CLINIC | Age: 61
End: 2021-12-23
Attending: INTERNAL MEDICINE
Payer: COMMERCIAL

## 2021-12-23 DIAGNOSIS — Z12.31 VISIT FOR SCREENING MAMMOGRAM: ICD-10-CM

## 2021-12-23 PROCEDURE — 77063 BREAST TOMOSYNTHESIS BI: CPT | Performed by: STUDENT IN AN ORGANIZED HEALTH CARE EDUCATION/TRAINING PROGRAM

## 2021-12-23 PROCEDURE — 77067 SCR MAMMO BI INCL CAD: CPT | Performed by: STUDENT IN AN ORGANIZED HEALTH CARE EDUCATION/TRAINING PROGRAM

## 2022-01-10 ENCOUNTER — OFFICE VISIT (OUTPATIENT)
Dept: INTERNAL MEDICINE | Facility: CLINIC | Age: 62
End: 2022-01-10
Attending: INTERNAL MEDICINE
Payer: COMMERCIAL

## 2022-01-10 VITALS
WEIGHT: 128 LBS | SYSTOLIC BLOOD PRESSURE: 151 MMHG | HEART RATE: 109 BPM | BODY MASS INDEX: 22.68 KG/M2 | HEIGHT: 63 IN | DIASTOLIC BLOOD PRESSURE: 87 MMHG

## 2022-01-10 DIAGNOSIS — I10 BENIGN ESSENTIAL HYPERTENSION: ICD-10-CM

## 2022-01-10 DIAGNOSIS — E78.00 PURE HYPERCHOLESTEROLEMIA: ICD-10-CM

## 2022-01-10 DIAGNOSIS — Z00.00 ROUTINE GENERAL MEDICAL EXAMINATION AT A HEALTH CARE FACILITY: Primary | ICD-10-CM

## 2022-01-10 PROCEDURE — 90750 HZV VACC RECOMBINANT IM: CPT

## 2022-01-10 PROCEDURE — 90471 IMMUNIZATION ADMIN: CPT

## 2022-01-10 PROCEDURE — 250N000021 HC RX MED A9270 GY (STAT IND- M) 250

## 2022-01-10 PROCEDURE — 99396 PREV VISIT EST AGE 40-64: CPT | Performed by: INTERNAL MEDICINE

## 2022-01-10 PROCEDURE — G0463 HOSPITAL OUTPT CLINIC VISIT: HCPCS

## 2022-01-10 RX ORDER — DIAZEPAM 10 MG
TABLET ORAL
COMMUNITY
Start: 2021-05-26 | End: 2023-01-30

## 2022-01-10 RX ORDER — ATORVASTATIN CALCIUM 10 MG/1
10 TABLET, FILM COATED ORAL DAILY
Qty: 90 TABLET | Refills: 3 | Status: SHIPPED | OUTPATIENT
Start: 2022-01-10 | End: 2023-02-15

## 2022-01-10 RX ORDER — LISINOPRIL AND HYDROCHLOROTHIAZIDE 12.5; 2 MG/1; MG/1
1 TABLET ORAL DAILY
Qty: 90 TABLET | Refills: 3 | Status: SHIPPED | OUTPATIENT
Start: 2022-01-10 | End: 2023-01-13

## 2022-01-10 ASSESSMENT — PAIN SCALES - GENERAL: PAINLEVEL: NO PAIN (0)

## 2022-01-10 ASSESSMENT — MIFFLIN-ST. JEOR: SCORE: 1124.73

## 2022-01-10 NOTE — LETTER
1/10/2022       RE: Cande Curiel  [address] [place] [address]     Dear Colleague,    Thank you for referring your patient, Cande Curiel, to the [facility] [place] at [facility]. Please see a copy of my visit note below.    Chief Complaint   Patient presents with     Physical     Annual exam   Sharon Huffman LPN  Answers for HPI/ROS submitted by the patient on 1/7/2022  General Symptoms: No  Skin Symptoms: No  HENT Symptoms: No  EYE SYMPTOMS: No  HEART SYMPTOMS: No  LUNG SYMPTOMS: No  INTESTINAL SYMPTOMS: No  URINARY SYMPTOMS: No  GYNECOLOGIC SYMPTOMS: No  BREAST SYMPTOMS: No  SKELETAL SYMPTOMS: No  BLOOD SYMPTOMS: No  NERVOUS SYSTEM SYMPTOMS: No  MENTAL HEALTH SYMPTOMS: No             SUBJECTIVE:   CC: Cande Curiel is an 61 year old woman who presents for preventive health visit.       Patient has been advised of split billing requirements and indicates understanding: Yes  HPI  Ability to successfully perform activities of daily living: Yes, no assistance needed  Home safety:  none identified   Hearing impairment: No        Hyperlipidemia Follow-Up      Are you regularly taking any medication or supplement to lower your cholesterol?   Yes-      Are you having muscle aches or other side effects that you think could be caused by your cholesterol lowering medication?  No    Hypertension Follow-up      Do you check your blood pressure regularly outside of the clinic? Yes     Are you following a low salt diet? Yes    Are your blood pressures ever more than 140 on the top number (systolic) OR more   than 90 on the bottom number (diastolic), for example 140/90? No      Today's PHQ-2 Score:   PHQ-2 ( 1999 [employer]) 2/19/2020   Q1: Little interest or pleasure in doing things 0   Q2: Feeling down, depressed or hopeless 0   PHQ-2 Score 0   PHQ-2 Total Score (12-17 Years)- Positive if 3 or more points; Administer PHQ-A if positive 0   Q1:  Little interest or pleasure in doing things -   Q2: Feeling down, depressed or hopeless -   PHQ-2 Score -       Abuse: Current or Past (Physical, Sexual or Emotional) - No  Do you feel safe in your environment? Yes        Social History     Tobacco Use     Smoking status: Never Smoker     Smokeless tobacco: Never Used   Substance Use Topics     Alcohol use: Yes     Alcohol/week: 1.0 standard drink     If you drink alcohol do you typically have >3 drinks per day or >7 drinks per week? No      No flowsheet data found.No flowsheet data found.    Reviewed orders with patient.  Reviewed health maintenance and updated orders accordingly - Yes  Labs reviewed in EPIC    Breast Cancer Screening:  Any new diagnosis of family breast, ovarian, or bowel cancer? No    FHS-7:   Breast CA Risk Assessment (FHS-7) 12/23/2021   Did any of your first-degree relatives have breast or ovarian cancer? No   Did any of your relatives have bilateral breast cancer? No   Did any man in your family have breast cancer? No   Did any woman in your family have breast and ovarian cancer? No   Did any woman in your family have breast cancer before age 50 y? No   Do you have 2 or more relatives with breast and/or ovarian cancer? No   Do you have 2 or more relatives with breast and/or bowel cancer? No       Mammogram Screening: Recommended mammography every 1-2 years with patient discussion and risk factor consideration  Pertinent mammograms are reviewed under the imaging tab.    History of abnormal Pap smear: NO - age 30-65 PAP every 5 years with negative HPV co-testing recommended     Reviewed and updated as needed this visit by clinical staff  Tobacco  Allergies  Meds             Reviewed and updated as needed this visit by Provider               Past Medical History:   Diagnosis Date     Abnormal uterine bleeding      Essential hypertension      Hyperlipidemia      Kidney stone      Nephrolithiasis      Varicella       Past Surgical History:    Procedure Laterality Date     BIOPSY       BREAST SURGERY       ENT SURGERY       KNEE SURGERY       ORTHOPEDIC SURGERY       [facility] VAGINAL HYSTERECTOMY         Review of Systems   Constitutional: Negative for chills, decreased appetite, fatigue, fever, night sweats, weight gain and weight loss.   HENT: Negative for ear discharge, ear pain, hearing loss, hoarse voice, mouth sores, nosebleeds, sinus pain, sore throat, tinnitus and trouble swallowing.    Eyes: Negative for double vision, pain and redness.   Respiratory: Negative for cough, hemoptysis, sputum production, shortness of breath, wheezing and sleep disturbances due to breathing.    Cardiovascular: Negative for chest pain, dyspnea on exertion, palpitations, orthopnea, claudication and syncope.   Gastrointestinal: Negative for abdominal pain, bloating, blood in stool, bowel incontinence, constipation, diarrhea, heartburn, hemorrhoids, jaundice, melena, nausea, rectal pain and vomiting.   Endocrine: Negative for polydipsia and polyphagia.   Breasts:  Negative for breast mass and discharge.   Genitourinary: Negative for bladder incontinence, decreased libido, difficulty urinating, dyspareunia, dysuria, flank pain, genital sores, hematuria, nocturia, urgency and vaginal discharge.   Musculoskeletal: Negative for arthralgias, back pain, joint swelling, myalgias, muscle cramps, neck pain and stiffness.   Skin: Negative for itching, nail changes, poor wound healing and rash.   Neurological: Negative for dizziness, tingling, tremors, speech change, seizures, loss of consciousness, weakness, light-headedness, numbness, headaches and disturbances in coordination.   Hematological: Does not bruise/bleed easily.   Psychiatric/Behavioral: Negative for decreased concentration, depression, hallucinations and memory loss. The patient is not nervous/anxious and does not have insomnia.      Review of Systems     Constitutional:  Negative for fever, chills, weight loss, weight  gain, fatigue, decreased appetite, night sweats, recent stressors, height gain, height loss, post-operative complications, incisional pain, hallucinations, increased energy, hyperactivity and confused.   HENT:  Negative for ear pain, hearing loss, tinnitus, nosebleeds, trouble swallowing, hoarse voice, mouth sores, sore throat, ear discharge, tooth pain, gum tenderness, taste disturbance, smell disturbance, hearing aid, bleeding gums, dry mouth, sinus pain, sinus congestion and neck mass.    Eyes:  Negative for double vision, pain, redness, eye pain, decreased vision, eye watering, eye bulging, eye dryness, flashing lights, spots, floaters, strabismus, tunnel vision, jaundice and eye irritation.   Respiratory:   Negative for cough, hemoptysis, sputum production, shortness of breath, wheezing, sleep disturbances due to breathing, snores loudly, respiratory pain, dyspnea on exertion, cough disturbing sleep and postural dyspnea.    Cardiovascular:  Negative for chest pain, dyspnea on exertion, palpitations, orthopnea, claudication, leg swelling, fingers/toes turn blue, hypertension, hypotension, syncope, history of heart murmur, chest pain on exertion, chest pain at rest, pacemaker, few scattered varicosities, leg pain, sleep disturbances due to breathing, tachycardia, light-headedness, exercise intolerance and edema.   Gastrointestinal:  Negative for heartburn, nausea, vomiting, abdominal pain, diarrhea, constipation, blood in stool, melena, rectal pain, bloating, hemorrhoids, bowel incontinence, jaundice, rectal bleeding, coffee ground emesis and change in stool.   Genitourinary:  Negative for bladder incontinence, dysuria, urgency, hematuria, flank pain, vaginal discharge, difficulty urinating, genital sores, dyspareunia, decreased libido, nocturia, voiding less frequently, arousal difficulty, abnormal vaginal bleeding, excessive menstruation, menstrual changes, hot flashes, vaginal dryness and postmenopausal  "bleeding.   Musculoskeletal:  Negative for myalgias, back pain, joint swelling, arthralgias, stiffness, muscle cramps, neck pain, bone pain, muscle weakness and fracture.   Skin:  Negative for nail changes, itching, poor wound healing, rash, hair changes, skin changes, acne, warts, poor wound healing, scarring, flaky skin, Raynaud's phenomenon, sensitivity to sunlight and skin thickening.   Neurological:  Negative for dizziness, tingling, tremors, speech change, seizures, loss of consciousness, weakness, light-headedness, numbness, headaches, disturbances in coordination, extremity numbness, memory loss, difficulty walking and paralysis.   Endo/Heme:  Negative for anemia, swollen glands and bruises/bleeds easily.   Psychiatric/Behavioral:  Negative for depression, hallucinations, memory loss, decreased concentration, mood swings and panic attacks.    Breast:  Negative for breast discharge, breast mass, breast pain and nipple retraction.   Endocrine:  Negative for altered temperature regulation, polyphagia, polydipsia, unwanted hair growth and change in facial hair.         OBJECTIVE:   BP (!) 151/87   Pulse 109   Ht 1.6 m (5' 3\")   Wt 58.1 kg (128 lb)   Breastfeeding No   BMI 22.67 kg/m    Physical Exam  Musculoskeletal:         General: No edema.       GENERAL: healthy, alert and no distress  EYES: Eyes grossly normal to inspection, PERRL and conjunctivae and sclerae normal  NECK: no adenopathy, no asymmetry, masses, or scars and thyroid normal to palpation  RESP: lungs clear to auscultation - no rales, rhonchi or wheezes  CV: regular rate and rhythm, normal S1 S2, no S3 or S4, no murmur, click or rub, no peripheral edema and peripheral pulses strong  ABDOMEN: soft, nontender, no hepatosplenomegaly, no masses and bowel sounds normal  MS: no gross musculoskeletal defects noted, no edema  SKIN: no suspicious lesions or rashes  NEURO: Normal strength and tone, mentation intact and speech normal  PSYCH: mentation " "appears normal, affect normal/bright    Diagnostic Test Results:  Labs reviewed in Epic    ASSESSMENT/PLAN:       ICD-10-CM    1. Routine general medical examination at a health care facility  Z00.00    2. Pure hypercholesterolemia  E78.00 atorvastatin (LIPITOR) 10 MG tablet     Lipid Profile   3. Benign essential hypertension  I10 lisinopril-hydrochlorothiazide (ZESTORETIC) 20-12.5 MG tablet     Basic Metabolic Panel     Hgb A1c     TSH with free T4 reflex     GLUCOSE       Patient has been advised of split billing requirements and indicates understanding: Yes  COUNSELING:  Reviewed preventive health counseling, as reflected in patient instructions       Regular exercise       Healthy diet/nutrition    Estimated body mass index is 22.67 kg/m  as calculated from the following:    Height as of this encounter: 1.6 m (5' 3\").    Weight as of this encounter: 58.1 kg (128 lb).        She reports that she has never smoked. She has never used smokeless tobacco.      Counseling Resources:  ATP IV Guidelines  Pooled Cohorts Equation Calculator  Breast Cancer Risk Calculator  BRCA-Related Cancer Risk Assessment: FHS-7 Tool  FRAX Risk Assessment  ICSI Preventive Guidelines  Dietary Guidelines for Americans, 2010  [employer]'s MyPlate  ASA Prophylaxis  Lung CA Screening    Analilia Wiley MD  [facility]      Again, thank you for allowing me to participate in the care of your patient.      Sincerely,    Analilia Wiley MD      "

## 2022-01-10 NOTE — PROGRESS NOTES
SUBJECTIVE:   CC: Cande Curiel is an 61 year old woman who presents for preventive health visit.       Patient has been advised of split billing requirements and indicates understanding: Yes  HPI  Ability to successfully perform activities of daily living: Yes, no assistance needed  Home safety:  none identified   Hearing impairment: No        Hyperlipidemia Follow-Up      Are you regularly taking any medication or supplement to lower your cholesterol?   Yes-      Are you having muscle aches or other side effects that you think could be caused by your cholesterol lowering medication?  No    Hypertension Follow-up      Do you check your blood pressure regularly outside of the clinic? Yes     Are you following a low salt diet? Yes    Are your blood pressures ever more than 140 on the top number (systolic) OR more   than 90 on the bottom number (diastolic), for example 140/90? No      Today's PHQ-2 Score:   PHQ-2 ( 1999 [employer]) 2/19/2020   Q1: Little interest or pleasure in doing things 0   Q2: Feeling down, depressed or hopeless 0   PHQ-2 Score 0   PHQ-2 Total Score (12-17 Years)- Positive if 3 or more points; Administer PHQ-A if positive 0   Q1: Little interest or pleasure in doing things -   Q2: Feeling down, depressed or hopeless -   PHQ-2 Score -       Abuse: Current or Past (Physical, Sexual or Emotional) - No  Do you feel safe in your environment? Yes        Social History     Tobacco Use     Smoking status: Never Smoker     Smokeless tobacco: Never Used   Substance Use Topics     Alcohol use: Yes     Alcohol/week: 1.0 standard drink     If you drink alcohol do you typically have >3 drinks per day or >7 drinks per week? No      No flowsheet data found.No flowsheet data found.    Reviewed orders with patient.  Reviewed health maintenance and updated orders accordingly - Yes  Labs reviewed in EPIC    Breast Cancer Screening:  Any new diagnosis of family breast, ovarian, or bowel cancer? No    FHS-7:    Breast CA Risk Assessment (FHS-7) 12/23/2021   Did any of your first-degree relatives have breast or ovarian cancer? No   Did any of your relatives have bilateral breast cancer? No   Did any man in your family have breast cancer? No   Did any woman in your family have breast and ovarian cancer? No   Did any woman in your family have breast cancer before age 50 y? No   Do you have 2 or more relatives with breast and/or ovarian cancer? No   Do you have 2 or more relatives with breast and/or bowel cancer? No       Mammogram Screening: Recommended mammography every 1-2 years with patient discussion and risk factor consideration  Pertinent mammograms are reviewed under the imaging tab.    History of abnormal Pap smear: NO - age 30-65 PAP every 5 years with negative HPV co-testing recommended     Reviewed and updated as needed this visit by clinical staff  Tobacco  Allergies  Meds             Reviewed and updated as needed this visit by Provider               Past Medical History:   Diagnosis Date     Abnormal uterine bleeding      Essential hypertension      Hyperlipidemia      Kidney stone      Nephrolithiasis      Varicella       Past Surgical History:   Procedure Laterality Date     BIOPSY       BREAST SURGERY       ENT SURGERY       KNEE SURGERY       ORTHOPEDIC SURGERY       ZZC VAGINAL HYSTERECTOMY         Review of Systems   Constitutional: Negative for chills, decreased appetite, fatigue, fever, night sweats, weight gain and weight loss.   HENT: Negative for ear discharge, ear pain, hearing loss, hoarse voice, mouth sores, nosebleeds, sinus pain, sore throat, tinnitus and trouble swallowing.    Eyes: Negative for double vision, pain and redness.   Respiratory: Negative for cough, hemoptysis, sputum production, shortness of breath, wheezing and sleep disturbances due to breathing.    Cardiovascular: Negative for chest pain, dyspnea on exertion, palpitations, orthopnea, claudication and syncope.    Gastrointestinal: Negative for abdominal pain, bloating, blood in stool, bowel incontinence, constipation, diarrhea, heartburn, hemorrhoids, jaundice, melena, nausea, rectal pain and vomiting.   Endocrine: Negative for polydipsia and polyphagia.   Breasts:  Negative for breast mass and discharge.   Genitourinary: Negative for bladder incontinence, decreased libido, difficulty urinating, dyspareunia, dysuria, flank pain, genital sores, hematuria, nocturia, urgency and vaginal discharge.   Musculoskeletal: Negative for arthralgias, back pain, joint swelling, myalgias, muscle cramps, neck pain and stiffness.   Skin: Negative for itching, nail changes, poor wound healing and rash.   Neurological: Negative for dizziness, tingling, tremors, speech change, seizures, loss of consciousness, weakness, light-headedness, numbness, headaches and disturbances in coordination.   Hematological: Does not bruise/bleed easily.   Psychiatric/Behavioral: Negative for decreased concentration, depression, hallucinations and memory loss. The patient is not nervous/anxious and does not have insomnia.      Review of Systems     Constitutional:  Negative for fever, chills, weight loss, weight gain, fatigue, decreased appetite, night sweats, recent stressors, height gain, height loss, post-operative complications, incisional pain, hallucinations, increased energy, hyperactivity and confused.   HENT:  Negative for ear pain, hearing loss, tinnitus, nosebleeds, trouble swallowing, hoarse voice, mouth sores, sore throat, ear discharge, tooth pain, gum tenderness, taste disturbance, smell disturbance, hearing aid, bleeding gums, dry mouth, sinus pain, sinus congestion and neck mass.    Eyes:  Negative for double vision, pain, redness, eye pain, decreased vision, eye watering, eye bulging, eye dryness, flashing lights, spots, floaters, strabismus, tunnel vision, jaundice and eye irritation.   Respiratory:   Negative for cough, hemoptysis, sputum  production, shortness of breath, wheezing, sleep disturbances due to breathing, snores loudly, respiratory pain, dyspnea on exertion, cough disturbing sleep and postural dyspnea.    Cardiovascular:  Negative for chest pain, dyspnea on exertion, palpitations, orthopnea, claudication, leg swelling, fingers/toes turn blue, hypertension, hypotension, syncope, history of heart murmur, chest pain on exertion, chest pain at rest, pacemaker, few scattered varicosities, leg pain, sleep disturbances due to breathing, tachycardia, light-headedness, exercise intolerance and edema.   Gastrointestinal:  Negative for heartburn, nausea, vomiting, abdominal pain, diarrhea, constipation, blood in stool, melena, rectal pain, bloating, hemorrhoids, bowel incontinence, jaundice, rectal bleeding, coffee ground emesis and change in stool.   Genitourinary:  Negative for bladder incontinence, dysuria, urgency, hematuria, flank pain, vaginal discharge, difficulty urinating, genital sores, dyspareunia, decreased libido, nocturia, voiding less frequently, arousal difficulty, abnormal vaginal bleeding, excessive menstruation, menstrual changes, hot flashes, vaginal dryness and postmenopausal bleeding.   Musculoskeletal:  Negative for myalgias, back pain, joint swelling, arthralgias, stiffness, muscle cramps, neck pain, bone pain, muscle weakness and fracture.   Skin:  Negative for nail changes, itching, poor wound healing, rash, hair changes, skin changes, acne, warts, poor wound healing, scarring, flaky skin, Raynaud's phenomenon, sensitivity to sunlight and skin thickening.   Neurological:  Negative for dizziness, tingling, tremors, speech change, seizures, loss of consciousness, weakness, light-headedness, numbness, headaches, disturbances in coordination, extremity numbness, memory loss, difficulty walking and paralysis.   Endo/Heme:  Negative for anemia, swollen glands and bruises/bleeds easily.   Psychiatric/Behavioral:  Negative for  "depression, hallucinations, memory loss, decreased concentration, mood swings and panic attacks.    Breast:  Negative for breast discharge, breast mass, breast pain and nipple retraction.   Endocrine:  Negative for altered temperature regulation, polyphagia, polydipsia, unwanted hair growth and change in facial hair.         OBJECTIVE:   BP (!) 151/87   Pulse 109   Ht 1.6 m (5' 3\")   Wt 58.1 kg (128 lb)   Breastfeeding No   BMI 22.67 kg/m    Physical Exam  Musculoskeletal:         General: No edema.       GENERAL: healthy, alert and no distress  EYES: Eyes grossly normal to inspection, PERRL and conjunctivae and sclerae normal  NECK: no adenopathy, no asymmetry, masses, or scars and thyroid normal to palpation  RESP: lungs clear to auscultation - no rales, rhonchi or wheezes  CV: regular rate and rhythm, normal S1 S2, no S3 or S4, no murmur, click or rub, no peripheral edema and peripheral pulses strong  ABDOMEN: soft, nontender, no hepatosplenomegaly, no masses and bowel sounds normal  MS: no gross musculoskeletal defects noted, no edema  SKIN: no suspicious lesions or rashes  NEURO: Normal strength and tone, mentation intact and speech normal  PSYCH: mentation appears normal, affect normal/bright    Diagnostic Test Results:  Labs reviewed in Epic    ASSESSMENT/PLAN:       ICD-10-CM    1. Routine general medical examination at a health care facility  Z00.00    2. Pure hypercholesterolemia  E78.00 atorvastatin (LIPITOR) 10 MG tablet     Lipid Profile   3. Benign essential hypertension  I10 lisinopril-hydrochlorothiazide (ZESTORETIC) 20-12.5 MG tablet     Basic Metabolic Panel     Hgb A1c     TSH with free T4 reflex     GLUCOSE       Patient has been advised of split billing requirements and indicates understanding: Yes  COUNSELING:  Reviewed preventive health counseling, as reflected in patient instructions       Regular exercise       Healthy diet/nutrition    Estimated body mass index is 22.67 kg/m  as " "calculated from the following:    Height as of this encounter: 1.6 m (5' 3\").    Weight as of this encounter: 58.1 kg (128 lb).        She reports that she has never smoked. She has never used smokeless tobacco.      Counseling Resources:  ATP IV Guidelines  Pooled Cohorts Equation Calculator  Breast Cancer Risk Calculator  BRCA-Related Cancer Risk Assessment: FHS-7 Tool  FRAX Risk Assessment  ICSI Preventive Guidelines  Dietary Guidelines for Americans, 2010  [employer]DA's MyPlate  ASA Prophylaxis  Lung CA Screening    Analilia Wiley MD  [facility] [place]  "

## 2022-01-10 NOTE — LETTER
Date:January 18, 2022      Patient was self referred, no letter generated. Do not send.        [facility] Health Information

## 2022-01-10 NOTE — PROGRESS NOTES
Chief Complaint   Patient presents with     Physical     Annual exam   Sharon Huffman LPN  Answers for HPI/ROS submitted by the patient on 1/7/2022  General Symptoms: No  Skin Symptoms: No  HENT Symptoms: No  EYE SYMPTOMS: No  HEART SYMPTOMS: No  LUNG SYMPTOMS: No  INTESTINAL SYMPTOMS: No  URINARY SYMPTOMS: No  GYNECOLOGIC SYMPTOMS: No  BREAST SYMPTOMS: No  SKELETAL SYMPTOMS: No  BLOOD SYMPTOMS: No  NERVOUS SYSTEM SYMPTOMS: No  MENTAL HEALTH SYMPTOMS: No

## 2022-01-17 ASSESSMENT — ENCOUNTER SYMPTOMS
DOUBLE VISION: 0
FLANK PAIN: 0
POLYDIPSIA: 0
HYPERTENSION: 0
HOT FLASHES: 0
BREAST PAIN: 0
PARALYSIS: 0
DECREASED CONCENTRATION: 0
SYNCOPE: 0
HYPOTENSION: 0
JOINT SWELLING: 0
RESPIRATORY PAIN: 0
EXERCISE INTOLERANCE: 0
FEVER: 0
WHEEZING: 0
NIGHT SWEATS: 0
SINUS CONGESTION: 0
EYE IRRITATION: 0
NECK MASS: 0
ABDOMINAL PAIN: 0
COUGH DISTURBING SLEEP: 0
TACHYCARDIA: 0
EYE WATERING: 0
SEIZURES: 0
RECTAL PAIN: 0
HEMATURIA: 0
DIZZINESS: 0
ARTHRALGIAS: 0
BACK PAIN: 0
INCREASED ENERGY: 0
NECK PAIN: 0
WEIGHT LOSS: 0
STIFFNESS: 0
TINGLING: 0
NERVOUS/ANXIOUS: 0
HEMOPTYSIS: 0
MYALGIAS: 0
SPEECH CHANGE: 0
LEG SWELLING: 0
PALPITATIONS: 0
DECREASED APPETITE: 0
SMELL DISTURBANCE: 0
HEARTBURN: 0
SLEEP DISTURBANCES DUE TO BREATHING: 0
RECTAL BLEEDING: 0
MUSCLE WEAKNESS: 0
SKIN CHANGES: 0
SHORTNESS OF BREATH: 0
EXTREMITY NUMBNESS: 0
DYSPNEA ON EXERTION: 0
CONSTIPATION: 0
FATIGUE: 0
SNORES LOUDLY: 0
MEMORY LOSS: 0
CLAUDICATION: 0
POOR WOUND HEALING: 0
SORE THROAT: 0
LOSS OF CONSCIOUSNESS: 0
BRUISES/BLEEDS EASILY: 0
BOWEL INCONTINENCE: 0
MUSCLE CRAMPS: 0
TROUBLE SWALLOWING: 0
PANIC: 0
LIGHT-HEADEDNESS: 0
DECREASED LIBIDO: 0
CHILLS: 0
BREAST MASS: 0
NAUSEA: 0
SWOLLEN GLANDS: 0
DIFFICULTY URINATING: 0
WEIGHT GAIN: 0
DYSURIA: 0
COUGH: 0
BLOATING: 0
SINUS PAIN: 0
ORTHOPNEA: 0
EYE PAIN: 0
ALTERED TEMPERATURE REGULATION: 0
DIARRHEA: 0
DEPRESSION: 0
NUMBNESS: 0
INSOMNIA: 0
HALLUCINATIONS: 0
SPUTUM PRODUCTION: 0
TASTE DISTURBANCE: 0
VOMITING: 0
NAIL CHANGES: 0
TREMORS: 0
JAUNDICE: 0
POLYPHAGIA: 0
BLOOD IN STOOL: 0
LEG PAIN: 0
POSTURAL DYSPNEA: 0
EYE REDNESS: 0
HEADACHES: 0
HOARSE VOICE: 0
DISTURBANCES IN COORDINATION: 0
WEAKNESS: 0

## 2022-02-25 ENCOUNTER — LAB (OUTPATIENT)
Dept: LAB | Facility: CLINIC | Age: 62
End: 2022-02-25
Payer: COMMERCIAL

## 2022-02-25 DIAGNOSIS — I10 BENIGN ESSENTIAL HYPERTENSION: ICD-10-CM

## 2022-02-25 DIAGNOSIS — E78.00 PURE HYPERCHOLESTEROLEMIA: ICD-10-CM

## 2022-02-25 LAB
ANION GAP SERPL CALCULATED.3IONS-SCNC: 7 MMOL/L (ref 3–14)
BUN SERPL-MCNC: 15 MG/DL (ref 7–30)
CALCIUM SERPL-MCNC: 10 MG/DL (ref 8.5–10.1)
CHLORIDE BLD-SCNC: 106 MMOL/L (ref 94–109)
CHOLEST SERPL-MCNC: 163 MG/DL
CO2 SERPL-SCNC: 26 MMOL/L (ref 20–32)
CREAT SERPL-MCNC: 0.75 MG/DL (ref 0.52–1.04)
FASTING STATUS PATIENT QL REPORTED: YES
FASTING STATUS PATIENT QL REPORTED: YES
GFR SERPL CREATININE-BSD FRML MDRD: >90 ML/MIN/1.73M2
GLUCOSE BLD-MCNC: 105 MG/DL (ref 70–99)
GLUCOSE BLD-MCNC: 105 MG/DL (ref 70–99)
HBA1C MFR BLD: 5.2 % (ref 0–5.6)
HDLC SERPL-MCNC: 70 MG/DL
LDLC SERPL CALC-MCNC: 77 MG/DL
NONHDLC SERPL-MCNC: 93 MG/DL
POTASSIUM BLD-SCNC: 3.8 MMOL/L (ref 3.4–5.3)
SODIUM SERPL-SCNC: 139 MMOL/L (ref 133–144)
TRIGL SERPL-MCNC: 82 MG/DL
TSH SERPL DL<=0.005 MIU/L-ACNC: 2.22 MU/L (ref 0.4–4)

## 2022-02-25 PROCEDURE — 84443 ASSAY THYROID STIM HORMONE: CPT

## 2022-02-25 PROCEDURE — 83036 HEMOGLOBIN GLYCOSYLATED A1C: CPT

## 2022-02-25 PROCEDURE — 80061 LIPID PANEL: CPT

## 2022-02-25 PROCEDURE — 36415 COLL VENOUS BLD VENIPUNCTURE: CPT

## 2022-02-25 PROCEDURE — 82310 ASSAY OF CALCIUM: CPT

## 2022-02-25 PROCEDURE — 82947 ASSAY GLUCOSE BLOOD QUANT: CPT

## 2022-07-15 DIAGNOSIS — T14.8XXA WOUND INFECTION: Primary | ICD-10-CM

## 2022-07-15 DIAGNOSIS — L08.9 WOUND INFECTION: Primary | ICD-10-CM

## 2022-07-15 RX ORDER — CLINDAMYCIN HCL 300 MG
300 CAPSULE ORAL 3 TIMES DAILY
Qty: 21 CAPSULE | Refills: 0 | Status: SHIPPED | OUTPATIENT
Start: 2022-07-15 | End: 2023-01-30

## 2022-07-26 NOTE — PROGRESS NOTES
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  ____________________________________________    Assessment & Plan:    # Neoplasm of unspecified behavior of the skin (D49.2) on the right shin. The differential diagnosis includes BCC vs SCC vs porokeratosis.   - Shave biopsy performed today (see procedure note(s) below).    # Multiple benign nevi.   # Lentigenes  - No concerning lesions today  - Monitor for ABCDEs of melanoma   - Continue sun protection - recommend SPF 30 or higher with frequent application   - Return sooner if noticing changing or symptomatic lesions    # Seborrheic keratoses, skin tags.  - Discussed the natural history and benign nature of these lesions. Reassurance provided that no additional treatment is necessary, however cryotherapy can be performed if inflamed/irritated or bothersome to patient.     # History of NMSC. No evidence of recurrent disease.  - Continue photoprotection - recommend SPF 30 or higher with frequent reapplication  - Continue yearly skin exams  - Advised to monitor for changing, non-healing, bleeding, painful, changing, or otherwise symptomatic lesions      Procedures Performed:   - Shave biopsy procedure note, location(s): right shin. After discussion of benefits and risks including but not limited to bleeding, infection, scar, incomplete removal, recurrence, and non-diagnostic biopsy, written consent and photographs were obtained. The area was cleaned with isopropyl alcohol. 0.5mL of 1% lidocaine with epinephrine was injected to obtain adequate anesthesia of lesion(s). Shave biopsy at site(s) performed. Hemostasis was achieved with aluminium  chloride. Petrolatum ointment and a sterile dressing were applied. The patient tolerated the procedure and no complications were noted. The patient was provided with verbal and written post care instructions.     Follow-up: 6-12 months, pending path results, sooner if concerns.     Staff and Scribe:     Chana Calabrese DO PGY-4  Department of Dermatology  [facility]    Scribe Disclosure:  I, Lili Oren, am serving as a scribe to document services personally performed by Renetta Haynes MD based on data collection and the provider's statements to me.     Staff Physician Comments:   I saw and evaluated the patient with the resident and I agree with the assessment and plan.  I was present for the key portions of the above major procedure and examination.    Renetta Haynes MD    Department of Dermatology  [facility]: Phone: [phone], Fax: [phone]  7/28/2022     ____________________________________________    CC: Skin Check (shin)    HPI:  Ms. Cande Curiel is a(n) 61 year old female who presents today as a return patient for a skin chec. The patient was last seen in dermatology on 12/23/19 by Dr. Griffin at which time AKs on the right forehead were treated with cryo, and a lesion on the mid R lower back was noted for monitoring.    She returns today with only one lesion of concern on the right shin that has been present for months and is asymptomatic but does not ever seem to go away.     Patient is otherwise feeling well, without additional skin concerns.    Labs Reviewed:  N/A    Physical Exam:  Vitals: There were no vitals taken for this visit.  SKIN: Full body skin exam excluding the genitals was performed including face, scalp, neck, ears, chest, back, bilateral arms, hands, bilateral legs, feet, and buttocks.   - 1 cm pink shiny papule with a peripheral rim of scale and white angulated  lines and prominent vessels on dermoscopy  - There are waxy stuck on tan to brown papules on the trunk and extremities.   - Multiple regular brown pigmented macules and papules are identified on the trunk and extremities.   - Scattered brown macules on sun exposed areas..   - No other lesions of concern on areas examined.     Medications:  Current Outpatient Medications   Medication     atorvastatin (LIPITOR) 10 MG tablet     Blood Pressure Monitoring (ADULT BLOOD PRESSURE CUFF LG) KIT     clindamycin (CLEOCIN) 300 MG capsule     diazepam (VALIUM) 10 MG tablet     lisinopril-hydrochlorothiazide (ZESTORETIC) 20-12.5 MG tablet     No current facility-administered medications for this visit.      Past Medical History:   Patient Active Problem List   Diagnosis     Pure hypercholesterolemia     Benign essential hypertension     S/P abdominal hysterectomy and left salpingo-oophorectomy     Past Medical History:   Diagnosis Date     Abnormal uterine bleeding      Essential hypertension      Hyperlipidemia      Kidney stone      Nephrolithiasis      Varicella         CC Referred Self, MD  No address on file on close of this encounter.

## 2022-07-27 ENCOUNTER — OFFICE VISIT (OUTPATIENT)
Dept: DERMATOLOGY | Facility: CLINIC | Age: 62
End: 2022-07-27
Payer: COMMERCIAL

## 2022-07-27 DIAGNOSIS — Z85.828 HISTORY OF NONMELANOMA SKIN CANCER: ICD-10-CM

## 2022-07-27 DIAGNOSIS — Z12.83 SCREENING EXAM FOR SKIN CANCER: Primary | ICD-10-CM

## 2022-07-27 DIAGNOSIS — L91.8 ACROCHORDON: ICD-10-CM

## 2022-07-27 DIAGNOSIS — L81.4 SOLAR LENTIGO: ICD-10-CM

## 2022-07-27 DIAGNOSIS — L82.1 SEBORRHEIC KERATOSES: ICD-10-CM

## 2022-07-27 DIAGNOSIS — D22.9 MULTIPLE BENIGN NEVI: ICD-10-CM

## 2022-07-27 DIAGNOSIS — D48.9 NEOPLASM OF UNCERTAIN BEHAVIOR: ICD-10-CM

## 2022-07-27 PROCEDURE — 11102 TANGNTL BX SKIN SINGLE LES: CPT | Mod: GC | Performed by: DERMATOLOGY

## 2022-07-27 PROCEDURE — 88305 TISSUE EXAM BY PATHOLOGIST: CPT | Mod: TC | Performed by: DERMATOLOGY

## 2022-07-27 PROCEDURE — 88305 TISSUE EXAM BY PATHOLOGIST: CPT | Mod: 26 | Performed by: DERMATOLOGY

## 2022-07-27 PROCEDURE — 99213 OFFICE O/P EST LOW 20 MIN: CPT | Mod: 25 | Performed by: DERMATOLOGY

## 2022-07-27 ASSESSMENT — PAIN SCALES - GENERAL: PAINLEVEL: NO PAIN (0)

## 2022-07-27 NOTE — PATIENT INSTRUCTIONS

## 2022-07-27 NOTE — LETTER
7/27/2022       RE: Cande Curiel  [address] [place] [address]     Dear Colleague,    Thank you for referring your patient, Cande Curiel, to the [facility] DERMATOLOGY CLINIC [place] at [facility]. Please see a copy of my visit note below.    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  ____________________________________________    Assessment & Plan:    # Neoplasm of unspecified behavior of the skin (D49.2) on the right shin. The differential diagnosis includes BCC vs SCC vs porokeratosis.   - Shave biopsy performed today (see procedure note(s) below).    # Multiple benign nevi.   # Lentigenes  - No concerning lesions today  - Monitor for ABCDEs of melanoma   - Continue sun protection - recommend SPF 30 or higher with frequent application   - Return sooner if noticing changing or symptomatic lesions    # Seborrheic keratoses, skin tags.  - Discussed the natural history and benign nature of these lesions. Reassurance provided that no additional treatment is necessary, however cryotherapy can be performed if inflamed/irritated or bothersome to patient.     # History of NMSC. No evidence of recurrent disease.  - Continue photoprotection - recommend SPF 30 or higher with frequent reapplication  - Continue yearly skin exams  - Advised to monitor for changing, non-healing, bleeding, painful, changing, or otherwise symptomatic lesions      Procedures Performed:   - Shave biopsy procedure note, location(s): right shin. After discussion of benefits and risks including but not limited to bleeding, infection, scar,  incomplete removal, recurrence, and non-diagnostic biopsy, written consent and photographs were obtained. The area was cleaned with isopropyl alcohol. 0.5mL of 1% lidocaine with epinephrine was injected to obtain adequate anesthesia of lesion(s). Shave biopsy at site(s) performed. Hemostasis was achieved with aluminium chloride. Petrolatum ointment and a sterile dressing were applied. The patient tolerated the procedure and no complications were noted. The patient was provided with verbal and written post care instructions.     Follow-up: 6-12 months, pending path results, sooner if concerns.     Staff and Scribe:     Chana Calabrese DO PGY-4  Department of Dermatology  [facility]    Scribe Disclosure:  I, Lili Oren, am serving as a scribe to document services personally performed by Renetta Haynes MD based on data collection and the provider's statements to me.     Staff Physician Comments:   I saw and evaluated the patient with the resident and I agree with the assessment and plan.  I was present for the key portions of the above major procedure and examination.    Renetta Haynes MD    Department of Dermatology  [facility]: Phone: [phone], Fax: [phone]  7/28/2022     ____________________________________________    CC: Skin Check (shin)    HPI:  Ms. Cnade Curiel is a(n) 61 year old female who presents today as a return patient for a skin chec. The patient was last seen in dermatology on 12/23/19 by Dr. Griffin at which time AKs on the right forehead were treated with cryo, and a lesion on the mid R lower back was noted for monitoring.    She returns today with only one lesion of concern on the right shin that has been present for months and is asymptomatic but does not ever seem to go away.     Patient is otherwise feeling well, without additional skin concerns.    Labs  Reviewed:  N/A    Physical Exam:  Vitals: There were no vitals taken for this visit.  SKIN: Full body skin exam excluding the genitals was performed including face, scalp, neck, ears, chest, back, bilateral arms, hands, bilateral legs, feet, and buttocks.   - 1 cm pink shiny papule with a peripheral rim of scale and white angulated lines and prominent vessels on dermoscopy  - There are waxy stuck on tan to brown papules on the trunk and extremities.   - Multiple regular brown pigmented macules and papules are identified on the trunk and extremities.   - Scattered brown macules on sun exposed areas..   - No other lesions of concern on areas examined.     Medications:  Current Outpatient Medications   Medication     atorvastatin (LIPITOR) 10 MG tablet     Blood Pressure Monitoring (ADULT BLOOD PRESSURE CUFF LG) KIT     clindamycin (CLEOCIN) 300 MG capsule     diazepam (VALIUM) 10 MG tablet     lisinopril-hydrochlorothiazide (ZESTORETIC) 20-12.5 MG tablet     No current facility-administered medications for this visit.      Past Medical History:   Patient Active Problem List   Diagnosis     Pure hypercholesterolemia     Benign essential hypertension     S/P abdominal hysterectomy and left salpingo-oophorectomy     Past Medical History:   Diagnosis Date     Abnormal uterine bleeding      Essential hypertension      Hyperlipidemia      Kidney stone      Nephrolithiasis      Varicella         CC Referred Self, MD  No address on file on close of this encounter.

## 2022-07-27 NOTE — NURSING NOTE
Lidocaine-epinephrine 1-1:877914 % injection   1.5mL once for one use, starting 7/27/2022 ending 7/27/2022,  2mL disp, R-0, injection  Injected by Dr. Chana Calabrese

## 2022-07-27 NOTE — NURSING NOTE
Dermatology Rooming Note    Cande Curiel's goals for this visit include:   Chief Complaint   Patient presents with     Skin Check     william Garcia, Visit Facilitator

## 2022-07-28 LAB
PATH REPORT.COMMENTS IMP SPEC: ABNORMAL
PATH REPORT.COMMENTS IMP SPEC: ABNORMAL
PATH REPORT.COMMENTS IMP SPEC: YES
PATH REPORT.FINAL DX SPEC: ABNORMAL
PATH REPORT.GROSS SPEC: ABNORMAL
PATH REPORT.MICROSCOPIC SPEC OTHER STN: ABNORMAL
PATH REPORT.RELEVANT HX SPEC: ABNORMAL

## 2022-07-29 ENCOUNTER — TELEPHONE (OUTPATIENT)
Dept: DERMATOLOGY | Facility: CLINIC | Age: 62
End: 2022-07-29

## 2022-07-29 DIAGNOSIS — C44.91 BCC (BASAL CELL CARCINOMA): Primary | ICD-10-CM

## 2022-08-02 NOTE — TELEPHONE ENCOUNTER
FUTURE VISIT INFORMATION      FUTURE VISIT INFORMATION:    Date: 9.1.22    Time: 8:30    Location: [facility]  REFERRAL INFORMATION:    Referring provider:  Ivy    Referring providers clinic:  Derm    Reason for visit/diagnosis  Right shin, BCC    RECORDS REQUESTED FROM:       Clinic name Comments Records Status Imaging Status   Derm 7.27.22  Path # [id number] [facility]

## 2022-08-13 DIAGNOSIS — J06.9 VIRAL UPPER RESPIRATORY TRACT INFECTION: Primary | ICD-10-CM

## 2022-08-13 RX ORDER — PREDNISONE 10 MG/1
10 TABLET ORAL 2 TIMES DAILY
Qty: 10 TABLET | Refills: 0 | Status: SHIPPED | OUTPATIENT
Start: 2022-08-13 | End: 2022-08-18

## 2022-09-01 ENCOUNTER — OFFICE VISIT (OUTPATIENT)
Dept: DERMATOLOGY | Facility: CLINIC | Age: 62
End: 2022-09-01
Payer: COMMERCIAL

## 2022-09-01 ENCOUNTER — PRE VISIT (OUTPATIENT)
Dept: DERMATOLOGY | Facility: CLINIC | Age: 62
End: 2022-09-01

## 2022-09-01 VITALS — DIASTOLIC BLOOD PRESSURE: 96 MMHG | HEART RATE: 118 BPM | SYSTOLIC BLOOD PRESSURE: 176 MMHG

## 2022-09-01 DIAGNOSIS — C44.712 BASAL CELL CARCINOMA (BCC) OF SKIN OF RIGHT LOWER EXTREMITY INCLUDING HIP: ICD-10-CM

## 2022-09-01 PROCEDURE — 17313 MOHS 1 STAGE T/A/L: CPT | Performed by: DERMATOLOGY

## 2022-09-01 ASSESSMENT — PAIN SCALES - GENERAL: PAINLEVEL: NO PAIN (0)

## 2022-09-01 NOTE — PATIENT INSTRUCTIONS

## 2022-09-01 NOTE — NURSING NOTE
Chief Complaint   Patient presents with     Derm Problem     Patient is here today for mohs on right thomas.      Sophia ELAINE CMA

## 2022-09-01 NOTE — LETTER
9/1/2022       RE: Cande Curiel  [address] [place] [address]     Dear Colleague,    Thank you for referring your patient, Cande Curiel, to the [facility] DERMATOLOGIC SURGERY CLINIC [place] at [facility]. Please see a copy of my visit note below.    [facility] Mohs Surgery Procedure Note    Case #: 1  Date of Service:  Sep 1, 2022  Surgery: Mohs micrographic surgery (MMS)  Staff surgeon: Gaston Knight MD  Fellow surgeon: Jerry Best MD  Resident surgeon: None  Nurse: Sophia Bradley CMA    Tumor Type: BCC  Location: right shin  Derm-Path Accession #: [id number]    Mohs Accession #:   Pre-Op Size: 1.4 cm x 0.7 cm  Final Defect Size: 1.5 cm x 1.5 cm  Number of Mohs stages: 1  Level of Defect: Fascia  Local anesthetic: 3 mL 1% lidocaine with epinephrine  Repair Type: second intent    Procedure:    Stage I  We discussed the principles of treatment and most likely complications including scarring, bleeding, infection, swelling, pain, crusting, nerve damage, large wound,  incomplete excision, wound dehiscence,  nerve damage, recurrence, and a second procedure may be recommended to obtain the best cosmetic or functional result.    Informed consent was obtained and the patient underwent the procedure as follows:  The patient was placed supine on the operating table.  The cancer was identified, outlined with a marker, and verified by the patient.  The entire surgical field was prepped with chlorhexidine.  The surgical site was anesthetized using lidocaine with epinephrine.    The area of clinically apparent tumor was debulked. The layer of tissue was then surgically excised using a #15 blade and was then transferred onto a specimen sheet maintaining the orientation of the specimen. Hemostasis was obtained using electrocoagulation. The wound site was then covered with a dressing while the tissue samples were processed for  examination.    The excised tissue was transported to the Mohs histology laboratory maintaining the tissue orientation.  The tissue specimen was relaxed so that the entire surgical margin was in a a single horizontal plane for sectioning and inked for precise mapping.  A precise reference map was drawn to reflect the sectioning of the specimen, colored inking of the margins, and orientation on the patient.  The tissue was processed using horizontal sectioning of the base and continuous peripheral margins.  The histopathologic sections were reviewed in conjunction with the reference map.    Total blocks: 1  Total slides: 2    There were no cancer cells visualized on examination, therefore Mohs surgery was complete.    EVALUATION OF MOHS DEFECT FOR RECONSTRUCTION    The patient is status post Mohs' micrographic surgery.  The surgical site was examined with attention to normal anatomic and functional relationships.  After consideration and discussion of multiple options with the patient, it was determined that healing by secondary intention (granulation) would offer the best chance for preservation/restoration of all normal anatomic and functional relationships.  The patient verbalized understanding and agrees with this plan. It is also understood that should second intention healing be sub-optimal, additional procedures such as scar revision, steroid injection or dermabrasion may be recommended.    The open wound was cleaned with Chlorhexidine and rinsed with sterile saline, and a thick layer of ointment was applied.  A pressure dressing consisting of non-adherent gauze, gauze and Hypafix was applied.   Wound care was discussed with patient both orally and in writing.  The patient stated understanding and agreement of the course of care, acknowledging that healing time may be relatively longer compared to primary intention or flap repair.       Gaston Knight MD performed the reconstruction/repair and was present for the  entire micrographic surgery and always immediately available.    Scribe Disclosure:  I, NALLELY WEDCANDIDA, am serving as a scribe to document services personally performed by Gaston Knight MD based on data collection and the provider's statements to me.     Attending attestation:  I personally performed the entire procedure.  I have reviewed the note and edited it as necessary, and agree with its contents.    Gaston Knight M.D.  Professor  Director of Dermatologic Surgery  Department of Dermatology  [facility]    Dermatology Surgery Clinic  [facility] Surgery [place] [id number]

## 2022-09-06 NOTE — PROGRESS NOTES
[facility] Mohs Surgery Procedure Note    Case #: 1  Date of Service:  Sep 1, 2022  Surgery: Mohs micrographic surgery (MMS)  Staff surgeon: Gaston Knight MD  Fellow surgeon: Jerry Best MD  Resident surgeon: None  Nurse: Sophia Bradley CMA    Tumor Type: BCC  Location: right shin  Derm-Path Accession #: [id number]    Mohs Accession #:   Pre-Op Size: 1.4 cm x 0.7 cm  Final Defect Size: 1.5 cm x 1.5 cm  Number of Mohs stages: 1  Level of Defect: Fascia  Local anesthetic: 3 mL 1% lidocaine with epinephrine  Repair Type: second intent    Procedure:    Stage I  We discussed the principles of treatment and most likely complications including scarring, bleeding, infection, swelling, pain, crusting, nerve damage, large wound,  incomplete excision, wound dehiscence,  nerve damage, recurrence, and a second procedure may be recommended to obtain the best cosmetic or functional result.    Informed consent was obtained and the patient underwent the procedure as follows:  The patient was placed supine on the operating table.  The cancer was identified, outlined with a marker, and verified by the patient.  The entire surgical field was prepped with chlorhexidine.  The surgical site was anesthetized using lidocaine with epinephrine.    The area of clinically apparent tumor was debulked. The layer of tissue was then surgically excised using a #15 blade and was then transferred onto a specimen sheet maintaining the orientation of the specimen. Hemostasis was obtained using electrocoagulation. The wound site was then covered with a dressing while the tissue samples were processed for examination.    The excised tissue was transported to the [facility] histology laboratory maintaining the tissue orientation.  The tissue specimen was relaxed so that the entire surgical margin was in a a single horizontal plane for sectioning and inked for precise mapping.  A precise reference map was drawn to reflect the sectioning  Verbal shift change report given to Deidre Rocha RN (oncoming nurse) by Reyna Hernandez (offgoing nurse). Report included the following information SBAR, Kardex, ED Summary, [facility] and Recent Results. of the specimen, colored inking of the margins, and orientation on the patient.  The tissue was processed using horizontal sectioning of the base and continuous peripheral margins.  The histopathologic sections were reviewed in conjunction with the reference map.    Total blocks: 1  Total slides: 2    There were no cancer cells visualized on examination, therefore Mohs surgery was complete.    EVALUATION OF MOHS DEFECT FOR RECONSTRUCTION    The patient is status post Mohs' micrographic surgery.  The surgical site was examined with attention to normal anatomic and functional relationships.  After consideration and discussion of multiple options with the patient, it was determined that healing by secondary intention (granulation) would offer the best chance for preservation/restoration of all normal anatomic and functional relationships.  The patient verbalized understanding and agrees with this plan. It is also understood that should second intention healing be sub-optimal, additional procedures such as scar revision, steroid injection or dermabrasion may be recommended.    The open wound was cleaned with Chlorhexidine and rinsed with sterile saline, and a thick layer of ointment was applied.  A pressure dressing consisting of non-adherent gauze, gauze and Hypafix was applied.   Wound care was discussed with patient both orally and in writing.  The patient stated understanding and agreement of the course of care, acknowledging that healing time may be relatively longer compared to primary intention or flap repair.       Gaston Knight MD performed the reconstruction/repair and was present for the entire micrographic surgery and always immediately available.    Scribe Disclosure:  I, NALLELY HSU, am serving as a scribe to document services personally performed by Gaston Knight MD based on data collection and the provider's statements to me.     Attending attestation:  I personally performed the entire procedure.  I have  reviewed the note and edited it as necessary, and agree with its contents.    Gaston Knight M.D.  Professor  Director of Dermatologic Surgery  Department of Dermatology  [facility]    Dermatology Surgery Clinic  [facility] and Surgery [place] [id number]

## 2022-09-17 ENCOUNTER — HEALTH MAINTENANCE LETTER (OUTPATIENT)
Age: 62
End: 2022-09-17

## 2022-10-03 ENCOUNTER — OFFICE VISIT (OUTPATIENT)
Dept: DERMATOLOGY | Facility: CLINIC | Age: 62
End: 2022-10-03
Payer: COMMERCIAL

## 2022-10-03 DIAGNOSIS — L57.0 ACTINIC KERATOSIS: ICD-10-CM

## 2022-10-03 DIAGNOSIS — L90.5 SCAR: Primary | ICD-10-CM

## 2022-10-03 DIAGNOSIS — C44.712 BASAL CELL CARCINOMA (BCC) OF SKIN OF RIGHT LOWER EXTREMITY INCLUDING HIP: ICD-10-CM

## 2022-10-03 PROCEDURE — 17000 DESTRUCT PREMALG LESION: CPT | Performed by: DERMATOLOGY

## 2022-10-03 PROCEDURE — 99212 OFFICE O/P EST SF 10 MIN: CPT | Mod: 25 | Performed by: DERMATOLOGY

## 2022-10-03 ASSESSMENT — PAIN SCALES - GENERAL: PAINLEVEL: NO PAIN (0)

## 2022-10-03 NOTE — NURSING NOTE
Chief Complaint   Patient presents with     Derm Problem     Patient is here today for a 3 week wound check on the lower leg.     Shiela IRVIN RN

## 2022-10-03 NOTE — PROGRESS NOTES
Dermatologic Surgery Clinic Note    Oct 3, 2022    Dermatology Problem List:  1.  Actinic keratoses s/p cryotherapy   2. History of nonmelanoma skin cancer  - BCC, right shin, s/p MMS 91/22  - SCC right posterior shoulder, treated in [place]  - SCC right forearm, [place] in [place]    Subjective: The patient is a 61 year old woman who presents today for a wound check after recent dermatologic surgery. No concerns for infection today. The patient continues with daily wound cares as recommended.    Today, patient reports a spot of concern near her scar site that she would like examined.     No other associated symptoms, modifying factors, or prior treatments, except when noted above. The patient denies any constitutional symptoms, lymphadenopathy, unintentional weight loss or decreased appetite. No other skin concerns today.    Objective:   Gen: This is a well appearing individual in no acute distress. The patient is alert and oriented x 3.  An exam of the the right leg was performed today and visualized the following:  - Nearly entirely healed wound with central 6 mm area of protruberant granulation tissue.   - There is an erythematous macule with overyling adherent scale on the right distal shin.   - The surgical site noted above is clean, dry, and intact. There is no surrounding erythema, purulence, or significant tenderness to palpation. No clinical evidence of infection noted today.    Assessment and Plan:     # Scar, right shin, s/p [facility] 9/1/22  - Silver nitrate applied today.  - The patient's surgery site(s) is/are healing very well. No evidence of infection on examination today.  - The patient was told to continue with wound cares until the area(s) is/are no longer crusted.   - The patient should follow up with dermatologic surgery PRN, as well as continue with regular skin exams in general dermatology clinic.    # AK, right distal shin  - Cryotherapy procedure note, location(s): see above. After verbal  consent and discussion of risks and benefits including, but not limited to, dyspigmentation/scar, blister, and pain, 1 lesion(s) was(were) treated with 1-2 mm freeze border for 1-2 cycles with liquid nitrogen. Post cryotherapy instructions were provided.    The patient was discussed with and evaluated by attending physician, Gaston Knight MD.    Scribe Disclosure:  I, NALLELY HSU, am serving as a scribe to document services personally performed by Gaston Knight MD based on data collection and the provider's statements to me.     Attending Attestation  I attest that the Scribe recorded the interview and exam that I personally performed.  I have reviewed the note and edited it as necessary.    Gaston Knight M.D.  Professor  Director of Dermatologic Surgery  Department of Dermatology  [facility]    Attending attestation:  I personally performed the entire procedure.  I have reviewed the note and edited it as necessary, and agree with its contents.    Gaston Knight M.D.  Professor  Director of Dermatologic Surgery  Department of Dermatology  [facility]    Dermatology Surgery Clinic  [facility] Surgery [address] [address]

## 2022-10-03 NOTE — LETTER
10/3/2022       RE: Cande Curiel  [address] [place] [address]     Dear Colleague,    Thank you for referring your patient, Cande Curiel, to the [facility] DERMATOLOGIC SURGERY CLINIC [place] at [facility]. Please see a copy of my visit note below.    Dermatologic Surgery Clinic Note    Oct 3, 2022    Dermatology Problem List:  1.  Actinic keratoses s/p cryotherapy   2. History of nonmelanoma skin cancer  - BCC, right shin, s/p [facility] 91/22  - SCC right posterior shoulder, treated in [place], [place]  - SCC right forearm, treated in [place]    Subjective: The patient is a 61 year old woman who presents today for a wound check after recent dermatologic surgery. No concerns for infection today. The patient continues with daily wound cares as recommended.    Today, patient reports a spot of concern near her scar site that she would like examined.     No other associated symptoms, modifying factors, or prior treatments, except when noted above. The patient denies any constitutional symptoms, lymphadenopathy, unintentional weight loss or decreased appetite. No other skin concerns today.    Objective:   Gen: This is a well appearing individual in no acute distress. The patient is alert and oriented x 3.  An exam of the the right leg was performed today and visualized the following:  - Nearly entirely healed wound with central 6 mm area of protruberant granulation tissue.   - There is an erythematous macule with overyling adherent scale on the right distal shin.   - The surgical site noted above is clean, dry, and intact. There is no surrounding erythema, purulence, or significant tenderness to palpation. No clinical evidence of infection noted today.    Assessment and Plan:     # Scar, right shin, s/p [facility] 9/1/22  - Silver nitrate applied today.  - The patient's surgery site(s) is/are healing very well. No evidence of infection on examination today.  - The  patient was told to continue with wound cares until the area(s) is/are no longer crusted.   - The patient should follow up with dermatologic surgery PRN, as well as continue with regular skin exams in general dermatology clinic.    # AK, right distal shin  - Cryotherapy procedure note, location(s): see above. After verbal consent and discussion of risks and benefits including, but not limited to, dyspigmentation/scar, blister, and pain, 1 lesion(s) was(were) treated with 1-2 mm freeze border for 1-2 cycles with liquid nitrogen. Post cryotherapy instructions were provided.    The patient was discussed with and evaluated by attending physician, Gaston Knight MD.    Scribe Disclosure:  I, NALLELY SHADI, am serving as a scribe to document services personally performed by Gaston Knight MD based on data collection and the provider's statements to me.     Attending Attestation  I attest that the Scribe recorded the interview and exam that I personally performed.  I have reviewed the note and edited it as necessary.    Gaston Knight M.D.  Professor  Director of Dermatologic Surgery  Department of Dermatology  [facility]    Attending attestation:  I personally performed the entire procedure.  I have reviewed the note and edited it as necessary, and agree with its contents.    Gaston Knight M.D.  Professor  Director of Dermatologic Surgery  Department of Dermatology  [facility]    Dermatology Surgery Clinic  [facility] [address] [address]

## 2022-12-15 DIAGNOSIS — R05.9 COUGH, UNSPECIFIED TYPE: Primary | ICD-10-CM

## 2022-12-15 RX ORDER — AZITHROMYCIN 250 MG/1
TABLET, FILM COATED ORAL
Qty: 6 TABLET | Refills: 0 | Status: SHIPPED | OUTPATIENT
Start: 2022-12-15 | End: 2022-12-20

## 2022-12-26 ENCOUNTER — ANCILLARY PROCEDURE (OUTPATIENT)
Dept: MAMMOGRAPHY | Facility: CLINIC | Age: 62
End: 2022-12-26
Attending: INTERNAL MEDICINE
Payer: COMMERCIAL

## 2022-12-26 DIAGNOSIS — Z12.31 VISIT FOR SCREENING MAMMOGRAM: ICD-10-CM

## 2022-12-26 PROCEDURE — 77067 SCR MAMMO BI INCL CAD: CPT | Mod: GC | Performed by: RADIOLOGY

## 2022-12-26 PROCEDURE — 77063 BREAST TOMOSYNTHESIS BI: CPT | Mod: GC | Performed by: RADIOLOGY

## 2023-01-02 DIAGNOSIS — R05.9 COUGH, UNSPECIFIED TYPE: Primary | ICD-10-CM

## 2023-01-02 RX ORDER — MONTELUKAST SODIUM 10 MG/1
10 TABLET ORAL AT BEDTIME
Qty: 10 TABLET | Refills: 0 | Status: SHIPPED | OUTPATIENT
Start: 2023-01-02 | End: 2023-01-30

## 2023-01-13 DIAGNOSIS — I10 BENIGN ESSENTIAL HYPERTENSION: ICD-10-CM

## 2023-01-13 RX ORDER — LISINOPRIL AND HYDROCHLOROTHIAZIDE 12.5; 2 MG/1; MG/1
TABLET ORAL
Qty: 90 TABLET | Refills: 0 | Status: SHIPPED | OUTPATIENT
Start: 2023-01-13 | End: 2023-01-30

## 2023-01-30 ENCOUNTER — OFFICE VISIT (OUTPATIENT)
Dept: INTERNAL MEDICINE | Facility: CLINIC | Age: 63
End: 2023-01-30
Attending: INTERNAL MEDICINE
Payer: COMMERCIAL

## 2023-01-30 VITALS
WEIGHT: 129 LBS | HEART RATE: 99 BPM | SYSTOLIC BLOOD PRESSURE: 132 MMHG | DIASTOLIC BLOOD PRESSURE: 82 MMHG | HEIGHT: 63 IN | BODY MASS INDEX: 22.86 KG/M2

## 2023-01-30 DIAGNOSIS — Z00.00 PREVENTATIVE HEALTH CARE: Primary | ICD-10-CM

## 2023-01-30 DIAGNOSIS — I10 BENIGN ESSENTIAL HYPERTENSION: ICD-10-CM

## 2023-01-30 PROCEDURE — 99396 PREV VISIT EST AGE 40-64: CPT | Performed by: INTERNAL MEDICINE

## 2023-01-30 PROCEDURE — G0463 HOSPITAL OUTPT CLINIC VISIT: HCPCS | Performed by: INTERNAL MEDICINE

## 2023-01-30 RX ORDER — LISINOPRIL AND HYDROCHLOROTHIAZIDE 12.5; 2 MG/1; MG/1
1 TABLET ORAL DAILY
Qty: 90 TABLET | Refills: 3 | Status: SHIPPED | OUTPATIENT
Start: 2023-01-30 | End: 2024-02-20

## 2023-01-30 ASSESSMENT — ENCOUNTER SYMPTOMS
RESPIRATORY PAIN: 0
BOWEL INCONTINENCE: 0
CLAUDICATION: 0
EYE IRRITATION: 0
WEIGHT GAIN: 0
SNORES LOUDLY: 0
HOT FLASHES: 0
EYE PAIN: 0
EYE WATERING: 0
HYPOTENSION: 0
HEADACHES: 0
EXERCISE INTOLERANCE: 0
HALLUCINATIONS: 0
HEMOPTYSIS: 0
POSTURAL DYSPNEA: 0
ARTHRALGIAS: 0
HEARTBURN: 0
STIFFNESS: 0
SHORTNESS OF BREATH: 0
HEMATURIA: 0
DYSURIA: 0
WEIGHT LOSS: 0
NERVOUS/ANXIOUS: 0
SYNCOPE: 0
BACK PAIN: 0
DECREASED CONCENTRATION: 0
SLEEP DISTURBANCES DUE TO BREATHING: 0
SKIN CHANGES: 0
ABDOMINAL PAIN: 0
SINUS CONGESTION: 0
BLOOD IN STOOL: 0
INCREASED ENERGY: 0
COUGH DISTURBING SLEEP: 0
DIFFICULTY URINATING: 0
SORE THROAT: 0
BRUISES/BLEEDS EASILY: 0
HOARSE VOICE: 0
DECREASED APPETITE: 0
CONSTIPATION: 0
ORTHOPNEA: 0
JOINT SWELLING: 0
POOR WOUND HEALING: 0
SWOLLEN GLANDS: 0
POLYPHAGIA: 0
TROUBLE SWALLOWING: 0
WHEEZING: 0
BREAST PAIN: 0
PANIC: 0
DIZZINESS: 0
SPUTUM PRODUCTION: 0
PALPITATIONS: 0
DYSPNEA ON EXERTION: 0
SPEECH CHANGE: 0
DECREASED LIBIDO: 0
SEIZURES: 0
LEG PAIN: 0
MUSCLE CRAMPS: 0
POLYDIPSIA: 0
FLANK PAIN: 0
NUMBNESS: 0
DIARRHEA: 0
RECTAL PAIN: 0
FATIGUE: 0
FEVER: 0
TREMORS: 0
EYE REDNESS: 0
VOMITING: 0
EXTREMITY NUMBNESS: 0
NECK MASS: 0
HYPERTENSION: 0
JAUNDICE: 0
RECTAL BLEEDING: 0
TASTE DISTURBANCE: 0
INSOMNIA: 0
NIGHT SWEATS: 0
LOSS OF CONSCIOUSNESS: 0
LEG SWELLING: 0
BLOATING: 0
ALTERED TEMPERATURE REGULATION: 0
TACHYCARDIA: 0
DISTURBANCES IN COORDINATION: 0
MUSCLE WEAKNESS: 0
LIGHT-HEADEDNESS: 0
CHILLS: 0
NAUSEA: 0
DOUBLE VISION: 0
SINUS PAIN: 0
COUGH: 0
WEAKNESS: 0
MEMORY LOSS: 0
SMELL DISTURBANCE: 0
BREAST MASS: 0
DEPRESSION: 0
NAIL CHANGES: 0
NECK PAIN: 0
PARALYSIS: 0
MYALGIAS: 0
TINGLING: 0

## 2023-01-30 NOTE — PATIENT INSTRUCTIONS
Thank you for trusting us with your care!     If you need to contact us for questions about:  Symptoms, Scheduling & Medical Questions; Non-urgent (2-3 day response) Jose Miguel message, Urgent (needing response today) [phone] (if after 3:30pm next day response)   Prescriptions: Please call your Pharmacy   Billing: Shae [phone] or [facility]:[phone]

## 2023-01-30 NOTE — LETTER
1/30/2023       RE: Cande Curiel  [address] [place] [address]     Dear Colleague,    Thank you for referring your patient, Cande Curiel, to the [facility] WOMEN'S CLINIC [place] at [facility]. Please see a copy of my visit note below.    Chief Complaint   Patient presents with     Physical     Annual exam   Sharon Huffman LPN  Answers for HPI/ROS submitted by the patient on 1/23/2023  General Symptoms: No  Skin Symptoms: No  HENT Symptoms: No  EYE SYMPTOMS: No  HEART SYMPTOMS: No  LUNG SYMPTOMS: No  INTESTINAL SYMPTOMS: No  URINARY SYMPTOMS: No  GYNECOLOGIC SYMPTOMS: No  BREAST SYMPTOMS: No  SKELETAL SYMPTOMS: No  BLOOD SYMPTOMS: No  NERVOUS SYSTEM SYMPTOMS: No  MENTAL HEALTH SYMPTOMS: No         SUBJECTIVE:   CC: Cande Curiel is an 62 year old woman who presents for preventive health visit.       Patient has been advised of split billing requirements and indicates understanding: Yes  Healthy Habits:    Do you get at least three servings of calcium containing foods daily (dairy, green leafy vegetables, etc.)? yes    Amount of exercise or daily activities, outside of work: 5-6 times a week, yoga, running    Problems taking medications regularly No    Medication side effects: No    Have you had an eye exam in the past two years? yes    Do you see a dentist twice per year? yes    Do you have sleep apnea, excessive snoring or daytime drowsiness?no      -------------------------------------    Today's PHQ-2 Score:   PHQ-2 ( 1999 [employer]) 1/23/2023 9/1/2022   Q1: Little interest or pleasure in doing things 0 0   Q2: Feeling down, depressed or hopeless 0 0   PHQ-2 Score 0 0   PHQ-2 Total Score (12-17 Years)- Positive if 3 or more points; Administer PHQ-A if positive - -   Q1: Little interest or pleasure in doing things Not at all -   Q2: Feeling down, depressed or hopeless Not at all -   PHQ-2 Score 0 -       Abuse: Current or Past(Physical,  Sexual or Emotional)- No  Do you feel safe in your environment? Yes        Social History     Tobacco Use     Smoking status: Never     Smokeless tobacco: Never   Substance Use Topics     Alcohol use: Yes     Alcohol/week: 1.0 standard drink     If you drink alcohol do you typically have >3 drinks per day or >7 drinks per week? No                     Reviewed orders with patient.  Reviewed health maintenance and updated orders accordingly - Yes  Labs reviewed in [facility]:   Breast CA Risk Assessment (FHS-7) 12/23/2021 12/26/2022   Did any of your first-degree relatives have breast or ovarian cancer? No No   Did any of your relatives have bilateral breast cancer? No No   Did any man in your family have breast cancer? No No   Did any woman in your family have breast and ovarian cancer? No No   Did any woman in your family have breast cancer before age 50 y? No No   Do you have 2 or more relatives with breast and/or ovarian cancer? No No   Do you have 2 or more relatives with breast and/or bowel cancer? No No       Mammogram Screening: Recommended mammography every 1-2 years with patient discussion and risk factor consideration  Pertinent mammograms are reviewed under the imaging tab.    Pertinent mammograms are reviewed under the imaging tab.  History of abnormal Pap smear: Status post benign hysterectomy. Health Maintenance and Surgical History updated.     Reviewed and updated as needed this visit by clinical staff   Tobacco  Allergies               Reviewed and updated as needed this visit by Provider                 Past Medical History:   Diagnosis Date     Abnormal uterine bleeding      Essential hypertension      Hyperlipidemia      Kidney stone      Nephrolithiasis      Varicella       Past Surgical History:   Procedure Laterality Date     BIOPSY       BREAST SURGERY       ENT SURGERY       KNEE SURGERY       ORTHOPEDIC SURGERY       ZZC VAGINAL HYSTERECTOMY         ROS:  Review of Systems      Constitutional:  Negative for fever, chills, weight loss, weight gain, fatigue, decreased appetite, night sweats, recent stressors, height gain, height loss, post-operative complications, incisional pain, hallucinations, increased energy, hyperactivity and confused.   HENT:  Negative for ear pain, hearing loss, tinnitus, nosebleeds, trouble swallowing, hoarse voice, mouth sores, sore throat, ear discharge, tooth pain, gum tenderness, taste disturbance, smell disturbance, hearing aid, bleeding gums, dry mouth, sinus pain, sinus congestion and neck mass.    Eyes:  Negative for double vision, pain, redness, eye pain, decreased vision, eye watering, eye bulging, eye dryness, flashing lights, spots, floaters, strabismus, tunnel vision, jaundice and eye irritation.   Respiratory:   Negative for cough, hemoptysis, sputum production, shortness of breath, wheezing, sleep disturbances due to breathing, snores loudly, respiratory pain, dyspnea on exertion, cough disturbing sleep and postural dyspnea.    Cardiovascular:  Negative for chest pain, dyspnea on exertion, palpitations, orthopnea, claudication, leg swelling, fingers/toes turn blue, hypertension, hypotension, syncope, history of heart murmur, chest pain on exertion, chest pain at rest, pacemaker, few scattered varicosities, leg pain, sleep disturbances due to breathing, tachycardia, light-headedness, exercise intolerance and edema.   Gastrointestinal:  Negative for heartburn, nausea, vomiting, abdominal pain, diarrhea, constipation, blood in stool, melena, rectal pain, bloating, hemorrhoids, bowel incontinence, jaundice, rectal bleeding, coffee ground emesis and change in stool.   Genitourinary:  Negative for bladder incontinence, dysuria, urgency, hematuria, flank pain, vaginal discharge, difficulty urinating, genital sores, dyspareunia, decreased libido, nocturia, voiding less frequently, arousal difficulty, abnormal vaginal bleeding, excessive menstruation,  "menstrual changes, hot flashes, vaginal dryness and postmenopausal bleeding.   Musculoskeletal:  Negative for myalgias, back pain, joint swelling, arthralgias, stiffness, muscle cramps, neck pain, bone pain, muscle weakness and fracture.   Skin:  Negative for nail changes, itching, poor wound healing, rash, hair changes, skin changes, acne, warts, poor wound healing, scarring, flaky skin, Raynaud's phenomenon, sensitivity to sunlight and skin thickening.   Neurological:  Negative for dizziness, tingling, tremors, speech change, seizures, loss of consciousness, weakness, light-headedness, numbness, headaches, disturbances in coordination, extremity numbness, memory loss, difficulty walking and paralysis.   Endo/Heme:  Negative for anemia, swollen glands and bruises/bleeds easily.   Psychiatric/Behavioral:  Negative for depression, hallucinations, memory loss, decreased concentration, mood swings and panic attacks.    Breast:  Negative for breast discharge, breast mass, breast pain and nipple retraction.   Endocrine:  Negative for altered temperature regulation, polyphagia, polydipsia, unwanted hair growth and change in facial hair.        OBJECTIVE:   /82   Pulse 99   Ht 1.6 m (5' 3\")   Wt 58.5 kg (129 lb)   BMI 22.85 kg/m    EXAM:  GENERAL APPEARANCE: healthy, alert and no distress  EYES: Eyes grossly normal to inspection, PERRL and conjunctivae and sclerae normal  RESP: lungs clear to auscultation - no rales, rhonchi or wheezes  CV: regular rate and rhythm, normal S1 S2, no S3 or S4, no murmur, click or rub, no peripheral edema and peripheral pulses strong  ABDOMEN: soft, nontender and bowel sounds normal  MS: no musculoskeletal defects are noted and gait is age appropriate without ataxia  SKIN: no suspicious lesions or rashes  NEURO: Normal strength and tone, sensory exam grossly normal, mentation intact and speech normal  PSYCH: mentation appears normal and affect normal/bright    Diagnostic Test " "Results:  Labs reviewed in Epic    ASSESSMENT/PLAN:       ICD-10-CM    1. Preventative health care  Z00.00 Comprehensive metabolic panel     Hemoglobin A1c     Lipid Profile     TSH with free T4 reflex      2. Benign essential hypertension  I10 lisinopril-hydrochlorothiazide (ZESTORETIC) 20-12.5 MG tablet          Patient has been advised of split billing requirements and indicates understanding: Yes  COUNSELING:   Reviewed preventive health counseling, as reflected in patient instructions       Regular exercise       Healthy diet/nutrition    Estimated body mass index is 22.85 kg/m  as calculated from the following:    Height as of this encounter: 1.6 m (5' 3\").    Weight as of this encounter: 58.5 kg (129 lb).        She reports that she has never smoked. She has never used smokeless tobacco.      Counseling Resources:  ATP IV Guidelines  Pooled Cohorts Equation Calculator  Breast Cancer Risk Calculator  BRCA-Related Cancer Risk Assessment: FHS-7 Tool  FRAX Risk Assessment  ICSI Preventive Guidelines  Dietary Guidelines for Americans, 2010  [employer]'s MyPlate  ASA Prophylaxis  Lung CA Screening    Analilia Wiley MD  [facility]OM[facility] [place]        Again, thank you for allowing me to participate in the care of your patient.      Sincerely,    Analilia Wiley MD      "

## 2023-01-30 NOTE — PROGRESS NOTES
SUBJECTIVE:   CC: Cande Curiel is an 62 year old woman who presents for preventive health visit.       Patient has been advised of split billing requirements and indicates understanding: Yes  Healthy Habits:    Do you get at least three servings of calcium containing foods daily (dairy, green leafy vegetables, etc.)? yes    Amount of exercise or daily activities, outside of work: 5-6 times a week, yoga, running    Problems taking medications regularly No    Medication side effects: No    Have you had an eye exam in the past two years? yes    Do you see a dentist twice per year? yes    Do you have sleep apnea, excessive snoring or daytime drowsiness?no      -------------------------------------    Today's PHQ-2 Score:   PHQ-2 ( 1999 Pfizer) 1/23/2023 9/1/2022   Q1: Little interest or pleasure in doing things 0 0   Q2: Feeling down, depressed or hopeless 0 0   PHQ-2 Score 0 0   PHQ-2 Total Score (12-17 Years)- Positive if 3 or more points; Administer PHQ-A if positive - -   Q1: Little interest or pleasure in doing things Not at all -   Q2: Feeling down, depressed or hopeless Not at all -   PHQ-2 Score 0 -       Abuse: Current or Past(Physical, Sexual or Emotional)- No  Do you feel safe in your environment? Yes        Social History     Tobacco Use     Smoking status: Never     Smokeless tobacco: Never   Substance Use Topics     Alcohol use: Yes     Alcohol/week: 1.0 standard drink     If you drink alcohol do you typically have >3 drinks per day or >7 drinks per week? No                     Reviewed orders with patient.  Reviewed health maintenance and updated orders accordingly - Yes  Labs reviewed in EPIC    FHS-7:   Breast CA Risk Assessment (FHS-7) 12/23/2021 12/26/2022   Did any of your first-degree relatives have breast or ovarian cancer? No No   Did any of your relatives have bilateral breast cancer? No No   Did any man in your family have breast cancer? No No   Did any woman in your family have breast and  ovarian cancer? No No   Did any woman in your family have breast cancer before age 50 y? No No   Do you have 2 or more relatives with breast and/or ovarian cancer? No No   Do you have 2 or more relatives with breast and/or bowel cancer? No No       Mammogram Screening: Recommended mammography every 1-2 years with patient discussion and risk factor consideration  Pertinent mammograms are reviewed under the imaging tab.    Pertinent mammograms are reviewed under the imaging tab.  History of abnormal Pap smear: Status post benign hysterectomy. Health Maintenance and Surgical History updated.     Reviewed and updated as needed this visit by clinical staff   Tobacco  Allergies               Reviewed and updated as needed this visit by Provider                 Past Medical History:   Diagnosis Date     Abnormal uterine bleeding      Essential hypertension      Hyperlipidemia      Kidney stone      Nephrolithiasis      Varicella       Past Surgical History:   Procedure Laterality Date     BIOPSY       BREAST SURGERY       ENT SURGERY       KNEE SURGERY       ORTHOPEDIC SURGERY       ZZC VAGINAL HYSTERECTOMY         ROS:  Review of Systems     Constitutional:  Negative for fever, chills, weight loss, weight gain, fatigue, decreased appetite, night sweats, recent stressors, height gain, height loss, post-operative complications, incisional pain, hallucinations, increased energy, hyperactivity and confused.   HENT:  Negative for ear pain, hearing loss, tinnitus, nosebleeds, trouble swallowing, hoarse voice, mouth sores, sore throat, ear discharge, tooth pain, gum tenderness, taste disturbance, smell disturbance, hearing aid, bleeding gums, dry mouth, sinus pain, sinus congestion and neck mass.    Eyes:  Negative for double vision, pain, redness, eye pain, decreased vision, eye watering, eye bulging, eye dryness, flashing lights, spots, floaters, strabismus, tunnel vision, jaundice and eye irritation.   Respiratory:    Negative for cough, hemoptysis, sputum production, shortness of breath, wheezing, sleep disturbances due to breathing, snores loudly, respiratory pain, dyspnea on exertion, cough disturbing sleep and postural dyspnea.    Cardiovascular:  Negative for chest pain, dyspnea on exertion, palpitations, orthopnea, claudication, leg swelling, fingers/toes turn blue, hypertension, hypotension, syncope, history of heart murmur, chest pain on exertion, chest pain at rest, pacemaker, few scattered varicosities, leg pain, sleep disturbances due to breathing, tachycardia, light-headedness, exercise intolerance and edema.   Gastrointestinal:  Negative for heartburn, nausea, vomiting, abdominal pain, diarrhea, constipation, blood in stool, melena, rectal pain, bloating, hemorrhoids, bowel incontinence, jaundice, rectal bleeding, coffee ground emesis and change in stool.   Genitourinary:  Negative for bladder incontinence, dysuria, urgency, hematuria, flank pain, vaginal discharge, difficulty urinating, genital sores, dyspareunia, decreased libido, nocturia, voiding less frequently, arousal difficulty, abnormal vaginal bleeding, excessive menstruation, menstrual changes, hot flashes, vaginal dryness and postmenopausal bleeding.   Musculoskeletal:  Negative for myalgias, back pain, joint swelling, arthralgias, stiffness, muscle cramps, neck pain, bone pain, muscle weakness and fracture.   Skin:  Negative for nail changes, itching, poor wound healing, rash, hair changes, skin changes, acne, warts, poor wound healing, scarring, flaky skin, Raynaud's phenomenon, sensitivity to sunlight and skin thickening.   Neurological:  Negative for dizziness, tingling, tremors, speech change, seizures, loss of consciousness, weakness, light-headedness, numbness, headaches, disturbances in coordination, extremity numbness, memory loss, difficulty walking and paralysis.   Endo/Heme:  Negative for anemia, swollen glands and bruises/bleeds easily.  "  Psychiatric/Behavioral:  Negative for depression, hallucinations, memory loss, decreased concentration, mood swings and panic attacks.    Breast:  Negative for breast discharge, breast mass, breast pain and nipple retraction.   Endocrine:  Negative for altered temperature regulation, polyphagia, polydipsia, unwanted hair growth and change in facial hair.        OBJECTIVE:   /82   Pulse 99   Ht 1.6 m (5' 3\")   Wt 58.5 kg (129 lb)   BMI 22.85 kg/m    EXAM:  GENERAL APPEARANCE: healthy, alert and no distress  EYES: Eyes grossly normal to inspection, PERRL and conjunctivae and sclerae normal  RESP: lungs clear to auscultation - no rales, rhonchi or wheezes  CV: regular rate and rhythm, normal S1 S2, no S3 or S4, no murmur, click or rub, no peripheral edema and peripheral pulses strong  ABDOMEN: soft, nontender and bowel sounds normal  MS: no musculoskeletal defects are noted and gait is age appropriate without ataxia  SKIN: no suspicious lesions or rashes  NEURO: Normal strength and tone, sensory exam grossly normal, mentation intact and speech normal  PSYCH: mentation appears normal and affect normal/bright    Diagnostic Test Results:  Labs reviewed in Epic    ASSESSMENT/PLAN:       ICD-10-CM    1. Preventative health care  Z00.00 Comprehensive metabolic panel     Hemoglobin A1c     Lipid Profile     TSH with free T4 reflex      2. Benign essential hypertension  I10 lisinopril-hydrochlorothiazide (ZESTORETIC) 20-12.5 MG tablet          Patient has been advised of split billing requirements and indicates understanding: Yes  COUNSELING:   Reviewed preventive health counseling, as reflected in patient instructions       Regular exercise       Healthy diet/nutrition    Estimated body mass index is 22.85 kg/m  as calculated from the following:    Height as of this encounter: 1.6 m (5' 3\").    Weight as of this encounter: 58.5 kg (129 lb).        She reports that she has never smoked. She has never used smokeless " tobacco.      Counseling Resources:  ATP IV Guidelines  Pooled Cohorts Equation Calculator  Breast Cancer Risk Calculator  BRCA-Related Cancer Risk Assessment: FHS-7 Tool  FRAX Risk Assessment  ICSI Preventive Guidelines  Dietary Guidelines for Americans, 2010  [employer]'s MyPlate  ASA Prophylaxis  Lung CA Screening    Analilia Wiley MD  [facility]

## 2023-01-30 NOTE — PROGRESS NOTES
Chief Complaint   Patient presents with     Physical     Annual exam   Sharon Huffman LPN  Answers for HPI/ROS submitted by the patient on 1/23/2023  General Symptoms: No  Skin Symptoms: No  HENT Symptoms: No  EYE SYMPTOMS: No  HEART SYMPTOMS: No  LUNG SYMPTOMS: No  INTESTINAL SYMPTOMS: No  URINARY SYMPTOMS: No  GYNECOLOGIC SYMPTOMS: No  BREAST SYMPTOMS: No  SKELETAL SYMPTOMS: No  BLOOD SYMPTOMS: No  NERVOUS SYSTEM SYMPTOMS: No  MENTAL HEALTH SYMPTOMS: No

## 2023-01-30 NOTE — LETTER
Date:January 31, 2023      Patient was self referred, no letter generated. Do not send.        [facility] Health Information

## 2023-02-13 DIAGNOSIS — E78.00 PURE HYPERCHOLESTEROLEMIA: ICD-10-CM

## 2023-02-15 RX ORDER — ATORVASTATIN CALCIUM 10 MG/1
TABLET, FILM COATED ORAL
Qty: 90 TABLET | Refills: 1 | Status: SHIPPED | OUTPATIENT
Start: 2023-02-15 | End: 2023-08-17

## 2023-02-15 NOTE — TELEPHONE ENCOUNTER
1/30/2023 LDL 2/22/22  [facility] [place]   Analilia Wiley MD

## 2023-02-16 NOTE — PROGRESS NOTES
[facility] Dermatology Note  Encounter Date: Feb 17, 2023  Office Visit     Dermatology Problem List:  1. History of nonmelanoma skin cancer  - BCC, right shin, s/p MMS 91/22  - SCC right posterior shoulder, treated in [place], [place]  - SCC right forearm, treated in [place]  2. 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  ____________________________________________    Assessment & Plan:    # Seborrheic keratoses  - Reassured of benign nature, no treatment necessary.    # Multiple benign nevi.   # Solar lentigines  - No concerning lesions today  - Monitor for ABCDEs of melanoma   - Continue sun protection - recommend SPF 30 or higher with frequent application   - Return sooner if noticing changing or symptomatic lesions     # History of NMSC. No evidence of recurrent disease.  - Continue photoprotection - recommend SPF 30 or higher with frequent reapplication  - Continue yearly skin exams  - Advised to monitor for changing, non-healing, bleeding, painful, changing, or otherwise symptomatic lesions    Procedures Performed:   None    Follow-up: 1 year(s) in-person, or earlier for new or changing lesions    Staff and Scribe:     Scribe Disclosure:  I, Zach Crawford, am serving as a scribe to document services personally performed by Renetta Haynes MD based on data collection and the provider's statements to me.     Provider Disclosure:   The documentation recorded by the scribe accurately reflects the services I personally performed and the decisions made by me.    Renetta Haynes MD    Department of Dermatology  [facility]: Phone: [phone], Fax: [phone]  2/17/2023     ____________________________________________    CC: Skin Check (Personal hx of BCC - no specific spots of concern)    HPI:  Ms. Cande Curiel is a(n) 62 year old female who presents today as  a return patient for FBSE. Last seen in dermatology by Dr. Knight on 10/3/2022, at which time silver nitrate was applied for treatment of scar on right shin. Additionally, patient underwent cryo for treatment of actinic keratoses on the right distal shin.    Today, patient does not report any specific spots of concern. She also reports that her Mohs surgery went well.    Patient is otherwise feeling well, without additional skin concerns.    Labs Reviewed:  N/A    Physical Exam:  Vitals: There were no vitals taken for this visit.  SKIN: Total skin excluding the undergarment areas was performed. The exam included the head/face, neck, both arms, chest, back, abdomen, both legs, digits and/or nails.   - Well healed scar on the right shin.  - Multiple regular brown pigmented macules and papules are identified on the trunk and extremities.   - Scattered brown macules on sun exposed areas.  - There are waxy stuck on tan to brown papules on the trunk and extremities.   - No other lesions of concern on areas examined.     Medications:  Current Outpatient Medications   Medication     atorvastatin (LIPITOR) 10 MG tablet     Blood Pressure Monitoring (ADULT BLOOD PRESSURE CUFF LG) KIT     lisinopril-hydrochlorothiazide (ZESTORETIC) 20-12.5 MG tablet     No current facility-administered medications for this visit.      Past Medical History:   Patient Active Problem List   Diagnosis     Pure hypercholesterolemia     Benign essential hypertension     S/P abdominal hysterectomy and left salpingo-oophorectomy     Past Medical History:   Diagnosis Date     Abnormal uterine bleeding      Essential hypertension      Hyperlipidemia      Kidney stone      Nephrolithiasis      Varicella         CC Referred Self, MD  No address on file on close of this encounter.

## 2023-02-17 ENCOUNTER — OFFICE VISIT (OUTPATIENT)
Dept: DERMATOLOGY | Facility: CLINIC | Age: 63
End: 2023-02-17
Payer: COMMERCIAL

## 2023-02-17 ENCOUNTER — LAB (OUTPATIENT)
Dept: LAB | Facility: CLINIC | Age: 63
End: 2023-02-17
Payer: COMMERCIAL

## 2023-02-17 ENCOUNTER — MYC MEDICAL ADVICE (OUTPATIENT)
Dept: INTERNAL MEDICINE | Facility: CLINIC | Age: 63
End: 2023-02-17

## 2023-02-17 DIAGNOSIS — Z00.00 PREVENTATIVE HEALTH CARE: ICD-10-CM

## 2023-02-17 DIAGNOSIS — L82.1 SEBORRHEIC KERATOSIS: ICD-10-CM

## 2023-02-17 DIAGNOSIS — Z85.828 HISTORY OF NONMELANOMA SKIN CANCER: Primary | ICD-10-CM

## 2023-02-17 DIAGNOSIS — L81.4 SOLAR LENTIGO: ICD-10-CM

## 2023-02-17 DIAGNOSIS — D22.9 MULTIPLE BENIGN NEVI: ICD-10-CM

## 2023-02-17 LAB
ALBUMIN SERPL BCG-MCNC: 4.8 G/DL (ref 3.5–5.2)
ALP SERPL-CCNC: 61 U/L (ref 35–104)
ALT SERPL W P-5'-P-CCNC: 44 U/L (ref 10–35)
ANION GAP SERPL CALCULATED.3IONS-SCNC: 10 MMOL/L (ref 7–15)
AST SERPL W P-5'-P-CCNC: 36 U/L (ref 10–35)
BILIRUB SERPL-MCNC: 0.4 MG/DL
BUN SERPL-MCNC: 15.5 MG/DL (ref 8–23)
CALCIUM SERPL-MCNC: 9.7 MG/DL (ref 8.8–10.2)
CHLORIDE SERPL-SCNC: 98 MMOL/L (ref 98–107)
CHOLEST SERPL-MCNC: 189 MG/DL
CREAT SERPL-MCNC: 0.65 MG/DL (ref 0.51–0.95)
DEPRECATED HCO3 PLAS-SCNC: 28 MMOL/L (ref 22–29)
GFR SERPL CREATININE-BSD FRML MDRD: >90 ML/MIN/1.73M2
GLUCOSE SERPL-MCNC: 108 MG/DL (ref 70–99)
HDLC SERPL-MCNC: 71 MG/DL
LDLC SERPL CALC-MCNC: 96 MG/DL
NONHDLC SERPL-MCNC: 118 MG/DL
POTASSIUM SERPL-SCNC: 3.7 MMOL/L (ref 3.4–5.3)
PROT SERPL-MCNC: 7.1 G/DL (ref 6.4–8.3)
SODIUM SERPL-SCNC: 136 MMOL/L (ref 136–145)
TRIGL SERPL-MCNC: 108 MG/DL
TSH SERPL DL<=0.005 MIU/L-ACNC: 2.59 UIU/ML (ref 0.3–4.2)

## 2023-02-17 PROCEDURE — 80053 COMPREHEN METABOLIC PANEL: CPT | Performed by: PATHOLOGY

## 2023-02-17 PROCEDURE — 80061 LIPID PANEL: CPT | Performed by: PATHOLOGY

## 2023-02-17 PROCEDURE — 36415 COLL VENOUS BLD VENIPUNCTURE: CPT | Performed by: PATHOLOGY

## 2023-02-17 PROCEDURE — 84443 ASSAY THYROID STIM HORMONE: CPT | Performed by: PATHOLOGY

## 2023-02-17 PROCEDURE — 99213 OFFICE O/P EST LOW 20 MIN: CPT | Performed by: DERMATOLOGY

## 2023-02-17 NOTE — NURSING NOTE
Dermatology Rooming Note    Cande Curiel's goals for this visit include:   Chief Complaint   Patient presents with     Skin Check     Personal hx of BCC - no specific spots of concern     Shavon Briceño, CMA

## 2023-02-17 NOTE — PATIENT INSTRUCTIONS
Patient Education     Checking for Skin Cancer  You can find cancer early by checking your skin each month. There are 3 kinds of skin cancer. They are melanoma, basal cell carcinoma, and squamous cell carcinoma. Doing monthly skin checks is the best way to find new marks or skin changes. Follow the instructions below for checking your skin.   The ABCDEs of checking moles for melanoma   Check your moles or growths for signs of melanoma using ABCDE:   Asymmetry: the sides of the mole or growth don t match  Border: the edges are ragged, notched, or blurred  Color: the color within the mole or growth varies  Diameter: the mole or growth is larger than 6 mm (size of a pencil eraser)  Evolving: the size, shape, or color of the mole or growth is changing (evolving is not shown in the images below)    Checking for other types of skin cancer  Basal cell carcinoma or squamous cell carcinoma have symptoms such as:     A spot or mole that looks different from all other marks on your skin  Changes in how an area feels, such as itching, tenderness, or pain  Changes in the skin's surface, such as oozing, bleeding, or scaliness  A sore that does not heal  New swelling or redness beyond the border of a mole    Who s at risk?  Anyone can get skin cancer. But you are at greater risk if you have:   Fair skin, light-colored hair, or light-colored eyes  Many moles or abnormal moles on your skin  A history of sunburns from sunlight or tanning beds  A family history of skin cancer  A history of exposure to radiation or chemicals  A weakened immune system  If you have had skin cancer in the past, you are at risk for recurring skin cancer.   How to check your skin  Do your monthly skin checkups in front of a full-length mirror. Check all parts of your body, including your:   Head (ears, face, neck, and scalp)  Torso (front, back, and sides)  Arms (tops, undersides, upper, and lower armpits)  Hands (palms, backs, and fingers, including  under the nails)  Buttocks and genitals  Legs (front, back, and sides)  Feet (tops, soles, toes, including under the nails, and between toes)  If you have a lot of moles, take digital photos of them each month. Make sure to take photos both up close and from a distance. These can help you see if any moles change over time.   Most skin changes are not cancer. But if you see any changes in your skin, call your doctor right away. Only he or she can diagnose a problem. If you have skin cancer, seeing your doctor can be the first step toward getting the treatment that could save your life.   [employer] last reviewed this educational content on 4/1/2019 2000-2020 [employer]. [address] [place] [address], [place] [address]. All rights reserved. This information is not intended as a substitute for professional medical care. Always follow your healthcare professional's instructions.       When should I call my doctor?  If you are worsening or not improving, please, contact us or seek urgent care as noted below.     Who should I call with questions (adults)?  [facility] (adult and pediatric): [phone]  [facility] (adult): [phone]  For urgent needs outside of business hours call the [facility] at [phone] and ask for the dermatology resident on call to be paged  If this is a medical emergency and you are unable to reach an ER, Call [phone]    Who should I call with questions (pediatric)?  [facility]- Pediatric Dermatology  Dr. Silva Blum, Dr. Jeanette Layton, Dr. Naa Flores, CHARLES Laboy, Dr. Rosanna Ramos, Dr. Karen Griffin & Dr. Kings Alva  Non-urgent nurse triage line; [phone]- Angela and Maria G YBARRA Care Coordinatorzee Hurst (/Complex ) [phone]    If you need a prescription refill, please contact your pharmacy. Refills are approved or denied by our  Physicians during normal business hours, Monday through Fridays  Per office policy, refills will not be granted if you have not been seen within the past year (or sooner depending on your child's condition)    Scheduling Information:  Pediatric Appointment Scheduling and Call Center [phone]  Radiology Scheduling- [phone]  Sedation Unit Scheduling- [phone]  [place] Scheduling- General [phone]; Pediatric Dermatology [phone]  Main  Services: [phone]  Georgian: [phone]  Swiss: [phone]  Hmong/Vincentian/Maori: [phone]  Preadmission Nursing Department Fax Number: [phone] (Fax all pre-operative paperwork to this number)    For urgent matters arising during evenings, weekends, or holidays that cannot wait for normal business hours please call [phone] and ask for the dermatology resident on call to be paged.

## 2023-02-17 NOTE — LETTER
2/17/2023       RE: Cande Curiel  [address] [place] [address]     Dear Colleague,    Thank you for referring your patient, Cande Curiel, to the [facility] DERMATOLOGY [facility]IC [place] [facility]. Please see a copy of my visit note below.    [facility] Dermatology Note  Encounter Date: Feb 17, 2023  Office Visit     Dermatology Problem List:  1. History of nonmelanoma skin cancer  - BCC, right shin, s/p MMS 91/22  - SCC right posterior shoulder, treated in [place], [place]  - SCC right forearm, treated in [place], [place]  2. 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  ____________________________________________    Assessment & Plan:    # Seborrheic keratoses  - Reassured of benign nature, no treatment necessary.    # Multiple benign nevi.   # Solar lentigines  - No concerning lesions today  - Monitor for ABCDEs of melanoma   - Continue sun protection - recommend SPF 30 or higher with frequent application   - Return sooner if noticing changing or symptomatic lesions     # History of NMSC. No evidence of recurrent disease.  - Continue photoprotection - recommend SPF 30 or higher with frequent reapplication  - Continue yearly skin exams  - Advised to monitor for changing, non-healing, bleeding, painful, changing, or otherwise symptomatic lesions    Procedures Performed:   None    Follow-up: 1 year(s) in-person, or earlier for new or changing lesions    Staff and Scribe:     Scribe Disclosure:  I, Zach Crawford, am serving as a scribe to document services personally performed by Renetta Haynes MD based on data collection and the provider's statements to me.     Provider Disclosure:   The documentation recorded by the scribe accurately reflects the services I personally performed and the decisions made by me.    Renetta Haynes MD    Department of Dermatology  [facility]  [facility]: Phone: [phone], Fax: [phone]  2/17/2023     ____________________________________________    CC: Skin Check (Personal hx of BCC - no specific spots of concern)    HPI:  Ms. Cande Curiel is a(n) 62 year old female who presents today as a return patient for FBSE. Last seen in dermatology by Dr. Knight on 10/3/2022, at which time silver nitrate was applied for treatment of scar on right shin. Additionally, patient underwent cryo for treatment of actinic keratoses on the right distal shin.    Today, patient does not report any specific spots of concern. She also reports that her Mohs surgery went well.    Patient is otherwise feeling well, without additional skin concerns.    Labs Reviewed:  N/A    Physical Exam:  Vitals: There were no vitals taken for this visit.  SKIN: Total skin excluding the undergarment areas was performed. The exam included the head/face, neck, both arms, chest, back, abdomen, both legs, digits and/or nails.   - Well healed scar on the right shin.  - Multiple regular brown pigmented macules and papules are identified on the trunk and extremities.   - Scattered brown macules on sun exposed areas.  - There are waxy stuck on tan to brown papules on the trunk and extremities.   - No other lesions of concern on areas examined.     Medications:  Current Outpatient Medications   Medication     atorvastatin (LIPITOR) 10 MG tablet     Blood Pressure Monitoring (ADULT BLOOD PRESSURE CUFF LG) KIT     lisinopril-hydrochlorothiazide (ZESTORETIC) 20-12.5 MG tablet     No current facility-administered medications for this visit.      Past Medical History:   Patient Active Problem List   Diagnosis     Pure hypercholesterolemia     Benign essential hypertension     S/P abdominal hysterectomy and left salpingo-oophorectomy     Past Medical History:   Diagnosis Date     Abnormal uterine bleeding      Essential hypertension       Hyperlipidemia      Kidney stone      Nephrolithiasis      Varicella         CC Referred Self, MD  No address on file on close of this encounter.

## 2023-03-06 LAB — HBA1C MFR BLD: NORMAL %

## 2023-04-06 ENCOUNTER — MYC MEDICAL ADVICE (OUTPATIENT)
Dept: DERMATOLOGY | Facility: CLINIC | Age: 63
End: 2023-04-06
Payer: COMMERCIAL

## 2023-05-16 ENCOUNTER — MYC MEDICAL ADVICE (OUTPATIENT)
Dept: INTERNAL MEDICINE | Facility: CLINIC | Age: 63
End: 2023-05-16
Payer: COMMERCIAL

## 2023-05-16 DIAGNOSIS — E78.00 PURE HYPERCHOLESTEROLEMIA: Primary | ICD-10-CM

## 2023-05-16 DIAGNOSIS — I10 BENIGN ESSENTIAL HYPERTENSION: ICD-10-CM

## 2023-05-26 ENCOUNTER — LAB (OUTPATIENT)
Dept: LAB | Facility: CLINIC | Age: 63
End: 2023-05-26
Payer: COMMERCIAL

## 2023-05-26 DIAGNOSIS — E78.00 PURE HYPERCHOLESTEROLEMIA: ICD-10-CM

## 2023-05-26 DIAGNOSIS — I10 BENIGN ESSENTIAL HYPERTENSION: ICD-10-CM

## 2023-05-26 LAB
ANION GAP SERPL CALCULATED.3IONS-SCNC: 11 MMOL/L (ref 7–15)
BUN SERPL-MCNC: 14.2 MG/DL (ref 8–23)
CALCIUM SERPL-MCNC: 10 MG/DL (ref 8.8–10.2)
CHLORIDE SERPL-SCNC: 100 MMOL/L (ref 98–107)
CHOLEST SERPL-MCNC: 143 MG/DL
CREAT SERPL-MCNC: 0.67 MG/DL (ref 0.51–0.95)
DEPRECATED HCO3 PLAS-SCNC: 27 MMOL/L (ref 22–29)
GFR SERPL CREATININE-BSD FRML MDRD: >90 ML/MIN/1.73M2
GLUCOSE SERPL-MCNC: 107 MG/DL (ref 70–99)
HBA1C MFR BLD: 5.3 %
HDLC SERPL-MCNC: 67 MG/DL
LDLC SERPL CALC-MCNC: 64 MG/DL
NONHDLC SERPL-MCNC: 76 MG/DL
POTASSIUM SERPL-SCNC: 4.3 MMOL/L (ref 3.4–5.3)
SODIUM SERPL-SCNC: 138 MMOL/L (ref 136–145)
TRIGL SERPL-MCNC: 61 MG/DL

## 2023-05-26 PROCEDURE — 36415 COLL VENOUS BLD VENIPUNCTURE: CPT

## 2023-05-26 PROCEDURE — 83036 HEMOGLOBIN GLYCOSYLATED A1C: CPT

## 2023-05-26 PROCEDURE — 82310 ASSAY OF CALCIUM: CPT

## 2023-05-26 PROCEDURE — 80061 LIPID PANEL: CPT

## 2023-06-09 DIAGNOSIS — G47.9 SLEEP DISORDER: Primary | ICD-10-CM

## 2023-06-09 RX ORDER — ZOLPIDEM TARTRATE 5 MG/1
5 TABLET ORAL
Qty: 8 TABLET | Refills: 0 | Status: SHIPPED | OUTPATIENT
Start: 2023-06-09 | End: 2023-10-25

## 2023-07-21 ENCOUNTER — ANCILLARY PROCEDURE (OUTPATIENT)
Dept: GENERAL RADIOLOGY | Facility: CLINIC | Age: 63
End: 2023-07-21
Attending: STUDENT IN AN ORGANIZED HEALTH CARE EDUCATION/TRAINING PROGRAM
Payer: COMMERCIAL

## 2023-07-21 ENCOUNTER — OFFICE VISIT (OUTPATIENT)
Dept: ORTHOPEDICS | Facility: CLINIC | Age: 63
End: 2023-07-21
Payer: COMMERCIAL

## 2023-07-21 DIAGNOSIS — S76.312A STRAIN OF LEFT HAMSTRING, INITIAL ENCOUNTER: Primary | ICD-10-CM

## 2023-07-21 DIAGNOSIS — M17.12 LOCALIZED OSTEOARTHRITIS OF LEFT KNEE: ICD-10-CM

## 2023-07-21 DIAGNOSIS — S76.312A STRAIN OF LEFT HAMSTRING, INITIAL ENCOUNTER: ICD-10-CM

## 2023-07-21 DIAGNOSIS — M25.569 KNEE PAIN: ICD-10-CM

## 2023-07-21 DIAGNOSIS — M25.569 KNEE PAIN: Primary | ICD-10-CM

## 2023-07-21 PROCEDURE — 99203 OFFICE O/P NEW LOW 30 MIN: CPT | Performed by: STUDENT IN AN ORGANIZED HEALTH CARE EDUCATION/TRAINING PROGRAM

## 2023-07-21 PROCEDURE — 73562 X-RAY EXAM OF KNEE 3: CPT | Mod: LT | Performed by: RADIOLOGY

## 2023-07-21 NOTE — PROGRESS NOTES
"[facility]  Sports Medicine Clinic  Clinics and Surgery Center           SUBJECTIVE       Cande Curiel is a 62 year old female presenting to clinic today with left knee pain. Today, the patient reports that she went on a run on 7/5/23 after getting back from a 2 week trip in [place], she did another run on 7/7/23 and took 1 week off due to soreness and just did yoga and biking instead. She did another run after 1 week off and is having continued achyness. She states that she is able to do yoga and biking, but the harder impact activities. She runs from 3.5-5.5 miles at a time at 4 days/week in combination with yoga and biking. The pain is located over the hamstring area, but also radiates to her proximal calf and anterior knee. She does feel instability especially with going downstairs. Denies any significant swelling. She will take ibuprofen prn. She does have a history of bilateral chondromalacia and had \"clean up procedures\" on both of the knees, this was done at an outside facility. She has never received any injections into bilateral knees.     Background:   Date of injury: 7/5/23  Duration of symptoms: 15 days   Mechanism of Injury: After a run  Intensity: 7/10   Aggravating factors: downstairs, running, knee flexion  Relieving Factors: Rest, non-impact activities   Prior Evaluation: None      PMH, Medications and Allergies were reviewed and updated as needed.    ROS:  As noted above otherwise negative.    Patient Active Problem List   Diagnosis     Pure hypercholesterolemia     Benign essential hypertension     S/P abdominal hysterectomy and left salpingo-oophorectomy       Current Outpatient Medications   Medication Sig Dispense Refill     atorvastatin (LIPITOR) 10 MG tablet TAKE 1 TABLET BY MOUTH EVERYDAY 90 tablet 1     Blood Pressure Monitoring (ADULT BLOOD PRESSURE CUFF LG) KIT 1 each daily 1 kit 0     lisinopril-hydrochlorothiazide (ZESTORETIC) 20-12.5 MG tablet Take 1 tablet by mouth daily " 90 tablet 3     zolpidem (AMBIEN) 5 MG tablet Take 1 tablet (5 mg) by mouth nightly as needed for sleep 8 tablet 0            OBJECTIVE:       Vitals: There were no vitals filed for this visit.  BMI: There is no height or weight on file to calculate BMI.    Gen:  Well nourished and in no acute distress  HEENT: Extraocular movement intact  Neck: Supple  Pulm:  Breathing Comfortably. No increased respiratory effort.  Psych: Euthymic. Appropriately answers questions    MSK: Left knee with mild to moderate effusion.  No discernible tenderness to palpation of the joint line.  No MCL or LCL tenderness.  No patellar or quad tenderness.  Range of motion lacking the last 5 to 7 degrees of extension, with concomitant hypertonicity of the posterior hamstring.  Tenderness to palpation of the mid substance of the hamstring.  No proximal or distal pain.  Negative Lachman and posterior drawer.  Negative valgus or varus stress testing.  Negative Judson.  Normal patella mechanics as much could be assessed with a mild effusion.      XRAY : Independent evaluation does show moderate left medial compartment osteoarthritis, as well as osteoarthrosis of the patellofemoral joint.          ASSESSMENT and PLAN:     Cande was seen today for pain.    Diagnoses and all orders for this visit:    Strain of left hamstring, initial encounter  -     Physical Therapy Referral; Future    Localized osteoarthritis of left knee  -     Physical Therapy Referral; Future      62-year-old female presenting to clinic today for a concern of left knee pain.  Patient is very active.  She has been trying to continue to be active despite the pain in her knee.  She does have a history of cartilage procedure of the bilateral posterior patella facets in her history, which is likely led to posttraumatic arthritis of the patellofemoral joint, and primary osteoarthritis of the left medial knee compartment.  I do think that the loss of extension does seem to be  secondary to hypertonic hamstrings, tertiary to the osteoarthritic changes that are happening in her knee.  She is not having any true mechanical symptoms such as locking, catching, or instability.  At this point I have discussed activity modification significantly with the patient.  She should refrain from high impact activity for the next 2 to 3 weeks.  We have provided her with an Ace bandage to remove the swelling from the knee.  Topical Voltaren is also been sent to her pharmacy as needed for the hamstring and osteoarthritic pain of the anterior knee.  Patient can follow-up with us as needed.  Anticipatory guidance and precautions have been advised.    Options for treatment and/or follow-up care were reviewed with the patient was actively involved in the decision making process. Patient verbalized understanding and was in agreement with the plan.    Ubaldo Morin DO  , Sports Medicine  Department of Family Medicine and [facility]

## 2023-07-21 NOTE — LETTER
"  7/21/2023      RE: Cande Curiel  [address] [place] [address]     Dear Colleague,    Thank you for referring your patient, Cande Curiel, to the [facility] SPORTS MEDICINE CLINIC [place]. Please see a copy of my visit note below.    [facility]  Sports Medicine Clinic  Clinics and Surgery Center           SUBJECTIVE       Cande Curiel is a 62 year old female presenting to clinic today with left knee pain. Today, the patient reports that she went on a run on 7/5/23 after getting back from a 2 week trip in [place], she did another run on 7/7/23 and took 1 week off due to soreness and just did yoga and biking instead. She did another run after 1 week off and is having continued achyness. She states that she is able to do yoga and biking, but the harder impact activities. She runs from 3.5-5.5 miles at a time at 4 days/week in combination with yoga and biking. The pain is located over the hamstring area, but also radiates to her proximal calf and anterior knee. She does feel instability especially with going downstairs. Denies any significant swelling. She will take ibuprofen prn. She does have a history of bilateral chondromalacia and had \"clean up procedures\" on both of the knees, this was done at an outside facility. She has never received any injections into bilateral knees.     Background:   Date of injury: 7/5/23  Duration of symptoms: 15 days   Mechanism of Injury: After a run  Intensity: 7/10   Aggravating factors: downstairs, running, knee flexion  Relieving Factors: Rest, non-impact activities   Prior Evaluation: None      PMH, Medications and Allergies were reviewed and updated as needed.    ROS:  As noted above otherwise negative.    Patient Active Problem List   Diagnosis    Pure hypercholesterolemia    Benign essential hypertension    S/P abdominal hysterectomy and left salpingo-oophorectomy       Current Outpatient Medications   Medication Sig Dispense Refill    " atorvastatin (LIPITOR) 10 MG tablet TAKE 1 TABLET BY MOUTH EVERYDAY 90 tablet 1    Blood Pressure Monitoring (ADULT BLOOD PRESSURE CUFF LG) KIT 1 each daily 1 kit 0    lisinopril-hydrochlorothiazide (ZESTORETIC) 20-12.5 MG tablet Take 1 tablet by mouth daily 90 tablet 3    zolpidem (AMBIEN) 5 MG tablet Take 1 tablet (5 mg) by mouth nightly as needed for sleep 8 tablet 0            OBJECTIVE:       Vitals: There were no vitals filed for this visit.  BMI: There is no height or weight on file to calculate BMI.    Gen:  Well nourished and in no acute distress  HEENT: Extraocular movement intact  Neck: Supple  Pulm:  Breathing Comfortably. No increased respiratory effort.  Psych: Euthymic. Appropriately answers questions    MSK: Left knee with mild to moderate effusion.  No discernible tenderness to palpation of the joint line.  No MCL or LCL tenderness.  No patellar or quad tenderness.  Range of motion lacking the last 5 to 7 degrees of extension, with concomitant hypertonicity of the posterior hamstring.  Tenderness to palpation of the mid substance of the hamstring.  No proximal or distal pain.  Negative Lachman and posterior drawer.  Negative valgus or varus stress testing.  Negative Judson.  Normal patella mechanics as much could be assessed with a mild effusion.      XRAY : Independent evaluation does show moderate left medial compartment osteoarthritis, as well as osteoarthrosis of the patellofemoral joint.          ASSESSMENT and PLAN:     Cande was seen today for pain.    Diagnoses and all orders for this visit:    Strain of left hamstring, initial encounter  -     Physical Therapy Referral; Future    Localized osteoarthritis of left knee  -     Physical Therapy Referral; Future      62-year-old female presenting to clinic today for a concern of left knee pain.  Patient is very active.  She has been trying to continue to be active despite the pain in her knee.  She does have a history of cartilage procedure of  the bilateral posterior patella facets in her history, which is likely led to posttraumatic arthritis of the patellofemoral joint, and primary osteoarthritis of the left medial knee compartment.  I do think that the loss of extension does seem to be secondary to hypertonic hamstrings, tertiary to the osteoarthritic changes that are happening in her knee.  She is not having any true mechanical symptoms such as locking, catching, or instability.  At this point I have discussed activity modification significantly with the patient.  She should refrain from high impact activity for the next 2 to 3 weeks.  We have provided her with an Ace bandage to remove the swelling from the knee.  Topical Voltaren is also been sent to her pharmacy as needed for the hamstring and osteoarthritic pain of the anterior knee.  Patient can follow-up with us as needed.  Anticipatory guidance and precautions have been advised.    Options for treatment and/or follow-up care were reviewed with the patient was actively involved in the decision making process. Patient verbalized understanding and was in agreement with the plan.    Ubaldo Morin DO  , Sports Medicine  Department of Family [facility] and [facility]        Again, thank you for allowing me to participate in the care of your patient.      Sincerely,    Ubaldo Morin DO

## 2023-07-24 ASSESSMENT — ACTIVITIES OF DAILY LIVING (ADL)
HOW_WOULD_YOU_RATE_THE_CURRENT_FUNCTION_OF_YOUR_KNEE_DURING_YOUR_USUAL_DAILY_ACTIVITIES_ON_A_SCALE_FROM_0_TO_100_WITH_100_BEING_YOUR_LEVEL_OF_KNEE_FUNCTION_PRIOR_TO_YOUR_INJURY_AND_0_BEING_THE_INABILITY_TO_PERFORM_ANY_OF_YOUR_USUAL_DAILY_ACTIVITIES?: 60
PAIN: THE SYMPTOM AFFECTS MY ACTIVITY MODERATELY
WALK: ACTIVITY IS MINIMALLY DIFFICULT
STIFFNESS: THE SYMPTOM AFFECTS MY ACTIVITY MODERATELY
KNEE_ACTIVITY_OF_DAILY_LIVING_SCORE: 75.71
SQUAT: ACTIVITY IS MINIMALLY DIFFICULT
KNEEL ON THE FRONT OF YOUR KNEE: ACTIVITY IS NOT DIFFICULT
LIMPING: THE SYMPTOM AFFECTS MY ACTIVITY SLIGHTLY
GO DOWN STAIRS: ACTIVITY IS MINIMALLY DIFFICULT
KNEE_ACTIVITY_OF_DAILY_LIVING_SUM: 53
AS_A_RESULT_OF_YOUR_KNEE_INJURY,_HOW_WOULD_YOU_RATE_YOUR_CURRENT_LEVEL_OF_DAILY_ACTIVITY?: ABNORMAL
GIVING WAY, BUCKLING OR SHIFTING OF KNEE: I HAVE THE SYMPTOM BUT IT DOES NOT AFFECT MY ACTIVITY
SWELLING: THE SYMPTOM AFFECTS MY ACTIVITY MODERATELY
GO UP STAIRS: ACTIVITY IS NOT DIFFICULT
PAIN: THE SYMPTOM AFFECTS MY ACTIVITY MODERATELY
WEAKNESS: I HAVE THE SYMPTOM BUT IT DOES NOT AFFECT MY ACTIVITY
SQUAT: ACTIVITY IS MINIMALLY DIFFICULT
SIT WITH YOUR KNEE BENT: ACTIVITY IS MINIMALLY DIFFICULT
LIMPING: THE SYMPTOM AFFECTS MY ACTIVITY SLIGHTLY
RISE FROM A CHAIR: ACTIVITY IS NOT DIFFICULT
WALK: ACTIVITY IS MINIMALLY DIFFICULT
STIFFNESS: THE SYMPTOM AFFECTS MY ACTIVITY MODERATELY
RISE FROM A CHAIR: ACTIVITY IS NOT DIFFICULT
RAW_SCORE: 53
STAND: ACTIVITY IS NOT DIFFICULT
KNEEL ON THE FRONT OF YOUR KNEE: ACTIVITY IS NOT DIFFICULT
AS_A_RESULT_OF_YOUR_KNEE_INJURY,_HOW_WOULD_YOU_RATE_YOUR_CURRENT_LEVEL_OF_DAILY_ACTIVITY?: ABNORMAL
HOW_WOULD_YOU_RATE_THE_OVERALL_FUNCTION_OF_YOUR_KNEE_DURING_YOUR_USUAL_DAILY_ACTIVITIES?: ABNORMAL
HOW_WOULD_YOU_RATE_THE_OVERALL_FUNCTION_OF_YOUR_KNEE_DURING_YOUR_USUAL_DAILY_ACTIVITIES?: ABNORMAL
SIT WITH YOUR KNEE BENT: ACTIVITY IS MINIMALLY DIFFICULT
STAND: ACTIVITY IS NOT DIFFICULT
GO DOWN STAIRS: ACTIVITY IS MINIMALLY DIFFICULT
SWELLING: THE SYMPTOM AFFECTS MY ACTIVITY MODERATELY
WEAKNESS: I HAVE THE SYMPTOM BUT IT DOES NOT AFFECT MY ACTIVITY
HOW_WOULD_YOU_RATE_THE_CURRENT_FUNCTION_OF_YOUR_KNEE_DURING_YOUR_USUAL_DAILY_ACTIVITIES_ON_A_SCALE_FROM_0_TO_100_WITH_100_BEING_YOUR_LEVEL_OF_KNEE_FUNCTION_PRIOR_TO_YOUR_INJURY_AND_0_BEING_THE_INABILITY_TO_PERFORM_ANY_OF_YOUR_USUAL_DAILY_ACTIVITIES?: 60
PLEASE_INDICATE_YOR_PRIMARY_REASON_FOR_REFERRAL_TO_THERAPY:: KNEE
GIVING WAY, BUCKLING OR SHIFTING OF KNEE: I HAVE THE SYMPTOM BUT IT DOES NOT AFFECT MY ACTIVITY
GO UP STAIRS: ACTIVITY IS NOT DIFFICULT

## 2023-07-27 ENCOUNTER — THERAPY VISIT (OUTPATIENT)
Dept: PHYSICAL THERAPY | Facility: CLINIC | Age: 63
End: 2023-07-27
Attending: STUDENT IN AN ORGANIZED HEALTH CARE EDUCATION/TRAINING PROGRAM
Payer: COMMERCIAL

## 2023-07-27 DIAGNOSIS — S76.312A STRAIN OF LEFT HAMSTRING, INITIAL ENCOUNTER: ICD-10-CM

## 2023-07-27 DIAGNOSIS — M17.12 LOCALIZED OSTEOARTHRITIS OF LEFT KNEE: ICD-10-CM

## 2023-07-27 PROCEDURE — 97161 PT EVAL LOW COMPLEX 20 MIN: CPT | Mod: GP | Performed by: PHYSICAL THERAPIST

## 2023-07-27 PROCEDURE — 97110 THERAPEUTIC EXERCISES: CPT | Mod: GP | Performed by: PHYSICAL THERAPIST

## 2023-07-27 NOTE — PROGRESS NOTES
PHYSICAL THERAPY EVALUATION  Type of Visit: Evaluation    See electronic medical record for Abuse and Falls Screening details.    Subjective       Presenting condition or subjective complaint: Pt presents to PT with a chief complaint of L hamstring and knee pain with reported onset ~1 month ago associated with running after a break while on vacation. Pt has refrained from running over the past 2 weeks and is biking, walking, and yoga instead. Pt reports improvement since stopping running but still has issues with prolonged walking.  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    Prior diagnostic imaging/testing results: X-ray     Prior therapy history for the same diagnosis, illness or injury: No      Prior Level of Function      Living Environment  Social support: With a significant other or spouse   Type of home: House   Stairs to enter the home: Yes 5 Is there a railing: Yes   Ramp: No   Stairs inside the home: Yes 15 Is there a railing: Yes   Help at home: None  Equipment owned:       Employment: No    Hobbies/Interests: Running, yoga, biking, golf    Patient goals for therapy: Run, Dread Pose for yoga, bike without working through the first few minutes of pedaling    Pain assessment: See objective evaluation for additional pain details     Objective   KNEE EVALUATION  PAIN: Pain Level at Rest: 0/10  Pain Level with Use: 9/10  Pain Location: L hamstring  Pain Quality: Aching and Sharp  Pain Frequency: intermittent  Pain is Worst: evening  Pain is Exacerbated By: running  Pain is Relieved By: NSAIDs and stretch  Pain Progression: Improved  INTEGUMENTARY (edema, incisions):   POSTURE:   GAIT:  Weightbearing Status:   Assistive Device(s):   Gait Deviations:   BALANCE/PROPRIOCEPTION:   WEIGHTBEARING ALIGNMENT:   NON-WEIGHTBEARING ALIGNMENT:   ROM:   (Degrees) Left AROM Left PROM  Right AROM  Right PROM   Knee Flexion 105 110 130 135   Knee Extension  0  0   Pain:   End feel:     STRENGTH: WNL  FLEXIBILITY: Decreased hamstrings L  SPECIAL TESTS:   FUNCTIONAL TESTS: Single Leg Squat: Knee valgus, Hip internal rotation, and Improper use of glutes/hips  PALPATION:   JOINT MOBILITY:  hypomobile patellar inf    Assessment & Plan   CLINICAL IMPRESSIONS  Medical Diagnosis: L hamstring strain, knee pain    Treatment Diagnosis: L knee pain   Impression/Assessment: Patient is a 62 year old female with L knee complaints.  The following significant findings have been identified: Pain, Decreased ROM/flexibility, Decreased joint mobility, Decreased strength, Impaired muscle performance, and Decreased activity tolerance. These impairments interfere with their ability to perform self care tasks, work tasks, recreational activities, household chores, and community mobility as compared to previous level of function.     Clinical Decision Making (Complexity):  Clinical Presentation: Stable/Uncomplicated  Clinical Presentation Rationale: based on medical and personal factors listed in PT evaluation  Clinical Decision Making (Complexity): Low complexity    PLAN OF CARE  Treatment Interventions:  Interventions: Gait Training, Manual Therapy, Neuromuscular Re-education, Therapeutic Activity, Therapeutic Exercise    Long Term Goals     PT Goal 1  Goal Identifier: L knee  Goal Description: Pt will be able to squat fully without increased knee pain  Rationale: to maximize safety and independence with performance of ADLs and functional tasks;to maximize safety and independence within the home  Goal Progress: pain ++ w/ partial squat  Target Date: 09/12/23      Frequency of Treatment: 1x week  Duration of Treatment: 6 weeks    Recommended Referrals to Other Professionals:   Education Assessment:   Learner/Method: Patient;No Barriers to Learning    Risks and benefits of evaluation/treatment have been explained.    Patient/Family/caregiver agrees with Plan of Care.     Evaluation Time:     PT Eric, Low Complexity Minutes (03554): 25       Signing Clinician: Alexei Willams PT

## 2023-08-11 ENCOUNTER — THERAPY VISIT (OUTPATIENT)
Dept: PHYSICAL THERAPY | Facility: CLINIC | Age: 63
End: 2023-08-11
Attending: STUDENT IN AN ORGANIZED HEALTH CARE EDUCATION/TRAINING PROGRAM
Payer: COMMERCIAL

## 2023-08-11 DIAGNOSIS — S76.312A STRAIN OF LEFT HAMSTRING, INITIAL ENCOUNTER: Primary | ICD-10-CM

## 2023-08-11 DIAGNOSIS — M17.12 LOCALIZED OSTEOARTHRITIS OF LEFT KNEE: ICD-10-CM

## 2023-08-11 PROCEDURE — 97110 THERAPEUTIC EXERCISES: CPT | Mod: GP | Performed by: PHYSICAL THERAPIST

## 2023-08-11 PROCEDURE — 97140 MANUAL THERAPY 1/> REGIONS: CPT | Mod: GP | Performed by: PHYSICAL THERAPIST

## 2023-08-12 DIAGNOSIS — E78.00 PURE HYPERCHOLESTEROLEMIA: ICD-10-CM

## 2023-08-17 ENCOUNTER — MYC MEDICAL ADVICE (OUTPATIENT)
Dept: INTERNAL MEDICINE | Facility: CLINIC | Age: 63
End: 2023-08-17
Payer: COMMERCIAL

## 2023-08-17 RX ORDER — ATORVASTATIN CALCIUM 10 MG/1
10 TABLET, FILM COATED ORAL DAILY
Qty: 90 TABLET | Refills: 1 | Status: SHIPPED | OUTPATIENT
Start: 2023-08-17 | End: 2024-02-20

## 2023-09-22 ENCOUNTER — TELEPHONE (OUTPATIENT)
Dept: DERMATOLOGY | Facility: CLINIC | Age: 63
End: 2023-09-22
Payer: COMMERCIAL

## 2023-09-22 NOTE — TELEPHONE ENCOUNTER
LVM for patient regarding rescheduling to Jolie Irving with Dr. Haynes same date, same time or rescheduling to a different day and time here at the [facility]. Left my direct number for scheduling and questions.

## 2023-10-25 ENCOUNTER — ANCILLARY PROCEDURE (OUTPATIENT)
Dept: GENERAL RADIOLOGY | Facility: CLINIC | Age: 63
End: 2023-10-25
Attending: STUDENT IN AN ORGANIZED HEALTH CARE EDUCATION/TRAINING PROGRAM
Payer: COMMERCIAL

## 2023-10-25 ENCOUNTER — OFFICE VISIT (OUTPATIENT)
Dept: ORTHOPEDICS | Facility: CLINIC | Age: 63
End: 2023-10-25
Payer: COMMERCIAL

## 2023-10-25 VITALS
BODY MASS INDEX: 22.32 KG/M2 | DIASTOLIC BLOOD PRESSURE: 95 MMHG | WEIGHT: 126 LBS | HEIGHT: 63 IN | SYSTOLIC BLOOD PRESSURE: 180 MMHG

## 2023-10-25 DIAGNOSIS — M79.674 PAIN OF TOE OF RIGHT FOOT: ICD-10-CM

## 2023-10-25 DIAGNOSIS — S92.502A CLOSED FRACTURE OF PHALANX OF LEFT FIFTH TOE, INITIAL ENCOUNTER: Primary | ICD-10-CM

## 2023-10-25 PROCEDURE — 99213 OFFICE O/P EST LOW 20 MIN: CPT | Performed by: STUDENT IN AN ORGANIZED HEALTH CARE EDUCATION/TRAINING PROGRAM

## 2023-10-25 PROCEDURE — 73630 X-RAY EXAM OF FOOT: CPT | Mod: TC | Performed by: RADIOLOGY

## 2023-10-25 NOTE — PROGRESS NOTES
"ASSESSMENT & PLAN    Cande was seen today for pain.    Diagnoses and all orders for this visit:    Closed fracture of phalanx of left fifth toe, initial encounter  -     XR Foot RT G/E 3 vw; Future  -     Ankle/Foot Bracing Supplies DME Post-op Shoe; Right      Presenting with acute injury to right pinky toe one week ago. Lucency on Xray showing oblique fracture of fifth phalanx corresponding to point of tenderness on exam. Nonsurgical options discussed at this time and patient in agreement with plan. Start with hard soled shoes. Tennis shoes still causing pain so was given Post op shoe for addition support. Buddy tape recommended. Ibuprofen 600mg every 8 hours for pain relief as needed. Anticipated healing time 4-6 weeks. Will see back if pain not improved in 2-4 weeks otherwise can follow-up as needed.       Yvette Rizo DO  [facility] [place]    -----  Chief Complaint   Patient presents with    Right Foot - Pain       SUBJECTIVE  Cande Curiel is a/an 62 year old female who is seen as a self referral for evaluation of right foot pain.     The patient is seen by themselves.    Onset: 1 week(s) ago. Patient describes injury as she stubbed her toe into her chair and she felt pain immediately.  Location of Pain: right top of foot near base of little toe  Worsened by: N/A  Better with: N/A  Treatments tried: rest/activity avoidance, elevation, ice, and ibuprofen  Associated symptoms: swelling and tingling    Orthopedic/Surgical history: NO  Social History/Occupation: Retired      REVIEW OF SYSTEMS:  10 point ROS is negative other than symptoms noted above in HPI, Past Medical History or as stated below  Constitutional: NEGATIVE for fever, chills, change in weight  Skin: NEGATIVE for worrisome rashes, moles or lesions  GI/: NEGATIVE for bowel or bladder changes  Neuro: NEGATIVE for weakness, dizziness or paresthesias      OBJECTIVE:  BP (!) 180/95   Ht 1.6 m (5' 3\")   Wt 57.2 " kg (126 lb)   BMI 22.32 kg/m     General: healthy, alert and in no distress  Skin: no suspicious lesions or rash.  CV: distal perfusion intact   Resp: normal respiratory effort without conversational dyspnea   Psych: normal mood and affect  Gait: antalgic  Neuro: Normal light sensory exam of lower extremity intact bl    RIGHT FOOT  Inspection:    swelling over the dorsal right fifth toe toe  Palpation:    Tender about the right pinky toe    No tenderness over the proximal 5th metatarsal or midshaft 5th metatarsal  Range of Motion:     Active toe flexion and extension  - Full  Strength:    Ankle strength - 5/5   Special Tests:    Negative: Lisfranc joint tenderness        RADIOLOGY:  Final results and radiologist's interpretation, available in the [facility] health record.  Images were reviewed with the patient in the office today.  My personal interpretation of the performed imaging:   Nondisplaced oblique Right fifth proximal phalangeal fracture    Report:  EXAM: FOOT RIGHT THREE OR MORE VIEWS  DATE/TIME: 10/25/2023 3:31 PM     INDICATION: Pain of toe of right foot.  COMPARISON: None available.                                                                       IMPRESSION: Oblique lucency through the fifth toe proximal phalangeal  mid to distal shaft could represent a nondisplaced fracture. Correlate  for focal point tenderness. No acute displaced fracture or dislocation  is identified. Degenerative change of the first MTP and metatarsal  sesamoid joints as well as several IP joints of the toes. Benign  eccentric sclerosis medial first proximal phalanx. Tiny heel spur.

## 2023-10-25 NOTE — PATIENT INSTRUCTIONS
1. Closed fracture of phalanx of left fifth toe, initial encounter      - Fracture fifth toe  - Hard soled shoe  - Ibuprofen 600mg every 8 hr for swelling and pain as needed. Always take with food  - Follow-up one month if pain not improved  -     Please call [phone]  Ask for my team if you have any questions or concerns    Yvette Rizo DO  [facility]      Thank you for choosing [facility]!    CLINIC LOCATIONS:     [place]  TRIAGE LINE: [phone] [address] [place] [address] APPOINTMENTS: [phone]   [place] [address] RADIOLOGY: [phone]   (Thursday & Friday) PHYSICAL THERAPY: [phone]    BILLING QUESTIONS: [phone]   [place] FAX: [phone] [address]    [place] [address]    (Monday & Wednesday

## 2023-10-25 NOTE — LETTER
10/25/2023         RE: Cande Curiel  [address] [place] [address]        Dear Colleague,    Thank you for referring your patient, Cande Curiel, to the [facility] SPORTS MEDICINE [facility]. Please see a copy of my visit note below.    ASSESSMENT & PLAN    Cande was seen today for pain.    Diagnoses and all orders for this visit:    Closed fracture of phalanx of left fifth toe, initial encounter  -     XR Foot RT G/E 3 vw; Future  -     Ankle/Foot Bracing Supplies DME Post-op Shoe; Right      Presenting with acute injury to right pinky toe one week ago. Lucency on Xray showing oblique fracture of fifth phalanx corresponding to point of tenderness on exam. Nonsurgical options discussed at this time and patient in agreement with plan. Start with hard soled shoes. Tennis shoes still causing pain so was given Post op shoe for addition support. Buddy tape recommended. Ibuprofen 600mg every 8 hours for pain relief as needed. Anticipated healing time 4-6 weeks. Will see back if pain not improved in 2-4 weeks otherwise can follow-up as needed.       Yvette Rizo,   [facility]    -----  Chief Complaint   Patient presents with     Right Foot - Pain       SUBJECTIVE  Cande Curiel is a/an 62 year old female who is seen as a self referral for evaluation of right foot pain.     The patient is seen by themselves.    Onset: 1 week(s) ago. Patient describes injury as she stubbed her toe into her chair and she felt pain immediately.  Location of Pain: right top of foot near base of little toe  Worsened by: N/A  Better with: N/A  Treatments tried: rest/activity avoidance, elevation, ice, and ibuprofen  Associated symptoms: swelling and tingling    Orthopedic/Surgical history: NO  Social History/Occupation: Retired      REVIEW OF SYSTEMS:  10 point ROS is negative other than symptoms noted above in HPI, Past Medical History or as stated  "below  Constitutional: NEGATIVE for fever, chills, change in weight  Skin: NEGATIVE for worrisome rashes, moles or lesions  GI/: NEGATIVE for bowel or bladder changes  Neuro: NEGATIVE for weakness, dizziness or paresthesias      OBJECTIVE:  BP (!) 180/95   Ht 1.6 m (5' 3\")   Wt 57.2 kg (126 lb)   BMI 22.32 kg/m     General: healthy, alert and in no distress  Skin: no suspicious lesions or rash.  CV: distal perfusion intact   Resp: normal respiratory effort without conversational dyspnea   Psych: normal mood and affect  Gait: antalgic  Neuro: Normal light sensory exam of lower extremity intact bl    RIGHT FOOT  Inspection:    swelling over the dorsal right fifth toe toe  Palpation:    Tender about the right pinky toe    No tenderness over the proximal 5th metatarsal or midshaft 5th metatarsal  Range of Motion:     Active toe flexion and extension  - Full  Strength:    Ankle strength - 5/5   Special Tests:    Negative: Lisfranc joint tenderness        RADIOLOGY:  Final results and radiologist's interpretation, available in the [facility] health record.  Images were reviewed with the patient in the office today.  My personal interpretation of the performed imaging:   Nondisplaced oblique Right fifth proximal phalangeal fracture    Report:  EXAM: FOOT RIGHT THREE OR MORE VIEWS  DATE/TIME: 10/25/2023 3:31 PM     INDICATION: Pain of toe of right foot.  COMPARISON: None available.                                                                       IMPRESSION: Oblique lucency through the fifth toe proximal phalangeal  mid to distal shaft could represent a nondisplaced fracture. Correlate  for focal point tenderness. No acute displaced fracture or dislocation  is identified. Degenerative change of the first MTP and metatarsal  sesamoid joints as well as several IP joints of the toes. Benign  eccentric sclerosis medial first proximal phalanx. Tiny heel spur.               Again, thank you for allowing me to participate in " the care of your patient.        Sincerely,        Yvette Rizo MD

## 2023-12-27 ENCOUNTER — ANCILLARY PROCEDURE (OUTPATIENT)
Dept: MAMMOGRAPHY | Facility: CLINIC | Age: 63
End: 2023-12-27
Attending: INTERNAL MEDICINE
Payer: COMMERCIAL

## 2023-12-27 DIAGNOSIS — Z12.31 VISIT FOR SCREENING MAMMOGRAM: ICD-10-CM

## 2023-12-27 PROCEDURE — 77067 SCR MAMMO BI INCL CAD: CPT | Mod: GC

## 2023-12-27 PROCEDURE — 77063 BREAST TOMOSYNTHESIS BI: CPT | Mod: GC

## 2024-01-03 ENCOUNTER — ANCILLARY PROCEDURE (OUTPATIENT)
Dept: MAMMOGRAPHY | Facility: CLINIC | Age: 64
End: 2024-01-03
Attending: INTERNAL MEDICINE
Payer: COMMERCIAL

## 2024-01-03 DIAGNOSIS — R92.8 ABNORMAL MAMMOGRAM OF RIGHT BREAST: ICD-10-CM

## 2024-01-03 PROCEDURE — G0279 TOMOSYNTHESIS, MAMMO: HCPCS | Performed by: RADIOLOGY

## 2024-01-03 PROCEDURE — 77065 DX MAMMO INCL CAD UNI: CPT | Mod: RT | Performed by: RADIOLOGY

## 2024-01-23 ENCOUNTER — OFFICE VISIT (OUTPATIENT)
Dept: DERMATOLOGY | Facility: CLINIC | Age: 64
End: 2024-01-23
Payer: COMMERCIAL

## 2024-01-23 DIAGNOSIS — Z85.828 HISTORY OF NONMELANOMA SKIN CANCER: ICD-10-CM

## 2024-01-23 DIAGNOSIS — L82.0 INFLAMED SEBORRHEIC KERATOSIS: ICD-10-CM

## 2024-01-23 DIAGNOSIS — L57.0 ACTINIC KERATOSIS: ICD-10-CM

## 2024-01-23 DIAGNOSIS — L82.1 SEBORRHEIC KERATOSIS: Primary | ICD-10-CM

## 2024-01-23 PROCEDURE — 17110 DESTRUCTION B9 LES UP TO 14: CPT | Performed by: DERMATOLOGY

## 2024-01-23 PROCEDURE — 17003 DESTRUCT PREMALG LES 2-14: CPT | Mod: XS | Performed by: DERMATOLOGY

## 2024-01-23 PROCEDURE — 17000 DESTRUCT PREMALG LESION: CPT | Mod: XS | Performed by: DERMATOLOGY

## 2024-01-23 ASSESSMENT — PAIN SCALES - GENERAL: PAINLEVEL: NO PAIN (0)

## 2024-01-23 NOTE — LETTER
1/23/2024       RE: Cande Curiel  [address] [place] [address]     Dear Colleague,    Thank you for referring your patient, Cande Curiel, to the [facility]. Please see a copy of my visit note below.    [facility] Dermatology Note  Encounter Date: Jan 23, 2024  Office Visit     Dermatology Problem List:   1. History of nonmelanoma skin cancer  - BCC, right shin, s/p MMS 91/22  - SCC right posterior shoulder, treated in [place], [place]  - SCC right forearm, treated in [place], [place]  2. 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  ____________________________________________    Assessment & Plan:  # SK, right medial inframammary fold  - cryo performed, see procedure note below.   - Discussed  the option for a biopsy but will proceed with less invasive treatment first. If spot persists with multiple cryo treatments, pt can discuss bx with , whom she is scheduled to see.     # AKs, R lateral forehead, R temple, nasal dorsum  - cryo performed, see procedure note below.     # Reassurance provided for benign lesions not treated today including cherry angiomata, solar lentigines, seborrheic keratoses, and banal-appearing melanocytic nevi.    # History of NMSC. No evidence of recurrent disease.  - Continue yearly skin exams      Procedures Performed:   - Cryotherapy procedure note, location(s): 3 Aks on R lateral forehead, R temple, nasal dorsum, 1 SK on right medial inframammary fold. After verbal consent and discussion of risks and benefits including, but not limited to, dyspigmentation/scar, blister, and pain, 4 lesion(s) was(were) treated with 1-2 mm freeze border for 1-2 cycles with liquid nitrogen. Post cryotherapy instructions were provided.      Follow-up: as scheduled with     Staff and Scribe:   IWILLIAMS, am serving as  a scribe; to document services personally performed by José Luis Weiner MD -based on data collection and the provider's statements to me.    Staff attestation:  The documentation recorded by the scribe accurately reflects the services I personally performed and the decisions I personally made. I have made edits where needed.    José Luis Weiner MD  Staff Dermatologist and Dermatopathologist  , Department of Dermatology   ____________________________________________    CC: Derm Problem (Change in growth under the right breast, grown in size/rough/raised.  )    HPI:  Ms. Cande Curiel is a(n) 63 year old female who presents today as a return patient for a fbse and a spot of concern under the right breast that is changing. Adds that it has grown in size and changed. Pt thought it was due to sweating. Pt has another spot of concern on the nose.    Patient is otherwise feeling well, without additional skin concerns.    Labs Reviewed:  N/A    Physical Exam:  Vitals: There were no vitals taken for this visit.  SKIN: Sun-exposed skin, which includes the head/face, neck, both arms, digits, and/or nails was examined.   - 3 mm AKs on the R lateral forehead, R temple, nasal dorsum   - 1.3 x 0.8 cm SK on the right medial inframammary fold  - There are dome shaped bright red papules on the head/neck, trunk, extremities.   - Multiple regular brown pigmented macules and papules are identified on the head/neck, trunk, extremities.   - Scattered brown macules on sun exposed areas.  - There are waxy stuck on tan to brown papules on the head/neck, trunk, extremities.  - No other lesions of concern on areas examined.     Medications:  Current Outpatient Medications   Medication    atorvastatin (LIPITOR) 10 MG tablet    Blood Pressure Monitoring (ADULT BLOOD PRESSURE CUFF LG) KIT    lisinopril-hydrochlorothiazide (ZESTORETIC) 20-12.5 MG tablet     No current facility-administered medications for this visit.       Past Medical History:   Patient Active Problem List   Diagnosis    Pure hypercholesterolemia    Benign essential hypertension    S/P abdominal hysterectomy and left salpingo-oophorectomy     Past Medical History:   Diagnosis Date    Abnormal uterine bleeding     Essential hypertension     Hyperlipidemia     Kidney stone     Nephrolithiasis     Varicella

## 2024-01-23 NOTE — NURSING NOTE
Dermatology Rooming Note    Cande Curiel's goals for this visit include:   Chief Complaint   Patient presents with    Derm Problem     Change in growth under the right breast, grown in size/rough/raised.       Miriam Parks, CMA

## 2024-01-23 NOTE — PROGRESS NOTES
[facility] Dermatology Note  Encounter Date: Jan 23, 2024  Office Visit     Dermatology Problem List:   1. History of nonmelanoma skin cancer  - BCC, right shin, s/p MMS 91/22  - SCC right posterior shoulder, treated in [place], [place]  - SCC right forearm, treated in [place], [place]  2. 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  ____________________________________________    Assessment & Plan:  # SK, right medial inframammary fold  - cryo performed, see procedure note below.   - Discussed  the option for a biopsy but will proceed with less invasive treatment first. If spot persists with multiple cryo treatments, pt can discuss bx with , whom she is scheduled to see.     # AKs, R lateral forehead, R temple, nasal dorsum  - cryo performed, see procedure note below.     # Reassurance provided for benign lesions not treated today including cherry angiomata, solar lentigines, seborrheic keratoses, and banal-appearing melanocytic nevi.    # History of NMSC. No evidence of recurrent disease.  - Continue yearly skin exams      Procedures Performed:   - Cryotherapy procedure note, location(s): 3 Aks on R lateral forehead, R temple, nasal dorsum, 1 SK on right medial inframammary fold. After verbal consent and discussion of risks and benefits including, but not limited to, dyspigmentation/scar, blister, and pain, 4 lesion(s) was(were) treated with 1-2 mm freeze border for 1-2 cycles with liquid nitrogen. Post cryotherapy instructions were provided.      Follow-up: as scheduled with     Staff and Scribe:   I, WILLIAMS DOTSON, am serving as a scribe; to document services personally performed by José Luis Weiner MD -based on data collection and the provider's statements to me.    Staff attestation:  The documentation recorded by the scribe accurately reflects the services I personally performed and the decisions I personally made. I have made edits  where needed.    José Luis Weiner MD  Staff Dermatologist and Dermatopathologist  , Department of Dermatology   ____________________________________________    CC: Derm Problem (Change in growth under the right breast, grown in size/rough/raised.  )    HPI:  Ms. Cande Curiel is a(n) 63 year old female who presents today as a return patient for a fbse and a spot of concern under the right breast that is changing. Adds that it has grown in size and changed. Pt thought it was due to sweating. Pt has another spot of concern on the nose.    Patient is otherwise feeling well, without additional skin concerns.    Labs Reviewed:  N/A    Physical Exam:  Vitals: There were no vitals taken for this visit.  SKIN: Sun-exposed skin, which includes the head/face, neck, both arms, digits, and/or nails was examined.   - 3 mm AKs on the R lateral forehead, R temple, nasal dorsum   - 1.3 x 0.8 cm SK on the right medial inframammary fold  - There are dome shaped bright red papules on the head/neck, trunk, extremities.   - Multiple regular brown pigmented macules and papules are identified on the head/neck, trunk, extremities.   - Scattered brown macules on sun exposed areas.  - There are waxy stuck on tan to brown papules on the head/neck, trunk, extremities.  - No other lesions of concern on areas examined.     Medications:  Current Outpatient Medications   Medication     atorvastatin (LIPITOR) 10 MG tablet     Blood Pressure Monitoring (ADULT BLOOD PRESSURE CUFF LG) KIT     lisinopril-hydrochlorothiazide (ZESTORETIC) 20-12.5 MG tablet     No current facility-administered medications for this visit.      Past Medical History:   Patient Active Problem List   Diagnosis     Pure hypercholesterolemia     Benign essential hypertension     S/P abdominal hysterectomy and left salpingo-oophorectomy     Past Medical History:   Diagnosis Date     Abnormal uterine bleeding      Essential hypertension      Hyperlipidemia       Kidney stone      Nephrolithiasis      Varicella

## 2024-02-20 ENCOUNTER — VIRTUAL VISIT (OUTPATIENT)
Dept: INTERNAL MEDICINE | Facility: CLINIC | Age: 64
End: 2024-02-20
Payer: COMMERCIAL

## 2024-02-20 DIAGNOSIS — E78.00 PURE HYPERCHOLESTEROLEMIA: Primary | ICD-10-CM

## 2024-02-20 DIAGNOSIS — I10 BENIGN ESSENTIAL HYPERTENSION: ICD-10-CM

## 2024-02-20 PROCEDURE — 99213 OFFICE O/P EST LOW 20 MIN: CPT | Mod: 95 | Performed by: INTERNAL MEDICINE

## 2024-02-20 RX ORDER — ATORVASTATIN CALCIUM 10 MG/1
10 TABLET, FILM COATED ORAL DAILY
Qty: 90 TABLET | Refills: 3 | Status: SHIPPED | OUTPATIENT
Start: 2024-02-20

## 2024-02-20 RX ORDER — LISINOPRIL AND HYDROCHLOROTHIAZIDE 12.5; 2 MG/1; MG/1
1 TABLET ORAL DAILY
Qty: 90 TABLET | Refills: 3 | Status: SHIPPED | OUTPATIENT
Start: 2024-02-20

## 2024-02-20 NOTE — NURSING NOTE
Is the patient currently in the state of [place]? YES    Visit mode:VIDEO    If the visit is dropped, the patient can be reconnected by: VIDEO VISIT: Text to cell phone:   Telephone Information:   Mobile [phone]    and VIDEO VISIT: Send to e-mail at: [email or web address]    Will anyone else be joining the visit? NO  (If patient encounters technical issues they should call [phone])    How would you like to obtain your AVS? MyChart    Are changes needed to the allergy or medication list? No    Reason for visit: RECHECK and Recheck Medication    Roxana DELGADO

## 2024-02-20 NOTE — PROGRESS NOTES
"Virtual Visit Details    Type of service:  Video Visit     Originating Location (pt. Location): {video visit patient location:105031::\"Home\"}  {PROVIDER LOCATION On-site should be selected for visits conducted from your clinic location or adjoining [facility] hospital, academic office, or other nearby [facility] building. Off-site should be selected for all other provider locations, including home:432910}  Distant Location (provider location):  {virtual location provider:572140}  Platform used for Video Visit: {Virtual Visit Platforms:187833::\"BuzzVote\"}  "

## 2024-02-20 NOTE — PATIENT INSTRUCTIONS
Thank you for visiting the Primary Care Center today at the [facility]! The following is some information about our clinic:     Primary Care Center Frequently-Asked Questions    (1) How do I schedule appointments at the [facility]?     Primary Care--to schedule or make changes to an existing appointment, please call our primary care line at [phone].    Labs--to schedule a lab appointment at the [facility] you can use [employer] or call [phone]. If you have a [place] location that is closer to home, you can reach out to that location for scheduling options.     Imaging--if you need to schedule a CT, X-ray, MRI, ultrasound, or other imaging study you can call [phone] to schedule at the [facility] or any other [facility] imaging location.     Referrals--if a referral to another specialty was ordered you can expect a phone call from their scheduling team. If you have not heard from them in a week, please call us or send us a [employer] message to check the status or get a scheduling number. Please note that this only applies to internal [facility] referrals. If the referral is external you would need to contact their office for scheduling.     (2) I have a question about my visit, who do I contact?     You can call us at the primary care line at [phone] to ask questions about your visit. You can also send a secure message through [employer], which is reviewed by clinic staff. Please note that [employer] messages have a twenty-four to forty-eight business hour turnaround time and should not be used for urgent concerns.    (3) How will I get the results of my tests?    If you are signed up for [employer] all tests will be released to you within twenty-four hours of resulting. Please allow three to five days for your doctor to review your results and place a note interpreting the results. If you do not have [employer] you will receive your  results through mail seven to ten business days following the return of the tests. Please note that if there should be any urgent or concerning results that your doctor or their registered nurse will reach out to you the same day as the tests come back. If you have follow up questions about your results or would like to discuss the results in detail please schedule a follow up with your provider either in person or virtually.     (4) How do I get refills of my prescriptions?     You should always first contact your pharmacy for refills of your medications. If submitting a refill request on [employer], please be sure to submit the request only once--repeat requests can cause delays in refill. If you are requesting a NEW medication or a medication related to new symptoms you will need to schedule an appointment with a provider prior to approval. Please note: Routine medication refills have up to one to three business day turnaround whereas controlled substances refills have up to five to seven business day turnaround.    (5) I have new symptoms, what do I do?     If you are having an immediate medical emergency, you should dial 911 for assistance.   For anything urgent that needs to be seen within a few hours to one day you should visit a local urgent care for assistance.  For non-urgent symptoms that need to be seen within a few days to a week you can schedule with an available provider in primary care by going to [employer] or calling [phone].   If you are not sure how serious your symptoms are or you would like to receive medical advice you can always call [phone] to speak with a triage nurse.

## 2024-02-20 NOTE — PROGRESS NOTES
Cande is a 63 year old who is being evaluated via a billable video visit.      How would you like to obtain your AVS? MyChart  If the video visit is dropped, the invitation should be resent by: Text to cell phone: [phone]  Will anyone else be joining your video visit? No          Assessment & Plan     Pure hypercholesterolemia  Discussed management of hyperlipidemia with patient. Advised on risks and benefits associated with statin therapy.  Recommend checking lipid panel in approximately a month.  Will also check fasting blood glucose and liver function tests.  - Lipid panel reflex to direct LDL Fasting; Future  - Comprehensive metabolic panel; Future  - atorvastatin (LIPITOR) 10 MG tablet; Take 1 tablet (10 mg) by mouth daily    Benign essential hypertension  Reviewed blood pressure goals.  Patient was advised that blood pressure checks during acute care visits related to trauma or procedures are not good indicators of blood pressure control.  Recommend follow-up in clinic.  Given that home blood pressures are within acceptable range.  Recommend to continue with current medication dose without changes.  - TSH with free T4 reflex; Future  - CBC with platelets; Future  - Hemoglobin A1c; Future  - lisinopril-hydrochlorothiazide (ZESTORETIC) 20-12.5 MG tablet; Take 1 tablet by mouth daily      I spent a total of 22 minutes on the day of the visit.   Time spent by me doing chart review, history and exam, documentation and further activities per the note            No follow-ups on file.      Subjective   Cande is a 63 year old, presenting for the following health issues:  RECHECK and Recheck Medication    HPI     Patient is following up on hypertension. She reports that her blood pressure at home has been in in 100-120/7o range. She states that she has been feeling well overall. She recently tested positive for COVID, however, she had very mild symptoms.  She denies medication related side effects.      Review of  Systems  Constitutional, HEENT, cardiovascular, pulmonary, GI, , musculoskeletal, neuro, skin, endocrine and psych systems are negative, except as otherwise noted.      Objective    Vitals - Patient Reported  Pain Score: No Pain (0)        Physical Exam   GENERAL: alert and no distress  EYES: Eyes grossly normal to inspection.  No discharge or erythema, or obvious scleral/conjunctival abnormalities.  RESP: No audible wheeze, cough, or visible cyanosis.    SKIN: Visible skin clear. No significant rash, abnormal pigmentation or lesions.  NEURO: Cranial nerves grossly intact.  Mentation and speech appropriate for age.  PSYCH: Appropriate affect, tone, and pace of words          Video-Visit Details    Type of service:  Video Visit     Originating Location (pt. Location): Home  Distant Location (provider location):  Off-site  Platform used for Video Visit: Ari[employer]  Signed Electronically by: Analilia Wiley MD

## 2024-03-22 DIAGNOSIS — R05.3 CHRONIC COUGH: Primary | ICD-10-CM

## 2024-03-22 RX ORDER — BENZONATATE 100 MG/1
100 CAPSULE ORAL 3 TIMES DAILY PRN
Qty: 30 CAPSULE | Refills: 0 | Status: SHIPPED | OUTPATIENT
Start: 2024-03-22 | End: 2024-03-30

## 2024-03-30 ENCOUNTER — OFFICE VISIT (OUTPATIENT)
Dept: FAMILY MEDICINE | Facility: CLINIC | Age: 64
End: 2024-03-30
Payer: COMMERCIAL

## 2024-03-30 VITALS
HEART RATE: 111 BPM | OXYGEN SATURATION: 99 % | TEMPERATURE: 96.8 F | DIASTOLIC BLOOD PRESSURE: 88 MMHG | SYSTOLIC BLOOD PRESSURE: 156 MMHG

## 2024-03-30 DIAGNOSIS — H61.23 BILATERAL IMPACTED CERUMEN: Primary | ICD-10-CM

## 2024-03-30 DIAGNOSIS — J01.01 ACUTE RECURRENT MAXILLARY SINUSITIS: ICD-10-CM

## 2024-03-30 DIAGNOSIS — R06.02 SOB (SHORTNESS OF BREATH): ICD-10-CM

## 2024-03-30 PROCEDURE — 69209 REMOVE IMPACTED EAR WAX UNI: CPT | Mod: 50

## 2024-03-30 PROCEDURE — 99213 OFFICE O/P EST LOW 20 MIN: CPT | Mod: 25

## 2024-03-30 RX ORDER — DOXYCYCLINE 100 MG/1
100 CAPSULE ORAL 2 TIMES DAILY
Qty: 20 CAPSULE | Refills: 0 | Status: SHIPPED | OUTPATIENT
Start: 2024-03-30 | End: 2024-04-09

## 2024-03-30 RX ORDER — FLUTICASONE PROPIONATE 50 MCG
1 SPRAY, SUSPENSION (ML) NASAL DAILY
Qty: 16 G | Refills: 0 | Status: SHIPPED | OUTPATIENT
Start: 2024-03-30 | End: 2024-05-13

## 2024-03-30 RX ORDER — PREDNISONE 20 MG/1
20 TABLET ORAL 2 TIMES DAILY
Qty: 10 TABLET | Refills: 0 | Status: SHIPPED | OUTPATIENT
Start: 2024-03-30 | End: 2024-04-04

## 2024-03-30 ASSESSMENT — ENCOUNTER SYMPTOMS
HEADACHES: 1
RHINORRHEA: 1
SHORTNESS OF BREATH: 1
COUGH: 1

## 2024-03-30 ASSESSMENT — LIFESTYLE VARIABLES: SMOKING_STATUS: 0

## 2024-03-30 NOTE — PROGRESS NOTES
Assessment & Plan       ICD-10-CM    1. Bilateral impacted cerumen  H61.23 REMOVE IMPACTED CERUMEN      2. Acute recurrent maxillary sinusitis  J01.01 doxycycline hyclate (VIBRAMYCIN) 100 MG capsule     fluticasone (FLONASE) 50 MCG/ACT nasal spray     predniSONE (DELTASONE) 20 MG tablet      3. SOB (shortness of breath)  R06.02 doxycycline hyclate (VIBRAMYCIN) 100 MG capsule     fluticasone (FLONASE) 50 MCG/ACT nasal spray     predniSONE (DELTASONE) 20 MG tablet         Both ears lavaged by Jesika Dsouza MA. This was well tolerated by patient.     1.  Plenty of fluids, rest, warm compresses on face  2.  Mucinex twice daily for at least 4 days  3.  Kinjal Pot or Rajinder Med 1x in the morning 1x at night (Saline mist spray is acceptable but not as effective)   4.  Benadryl (diphenhydramine) at bedtime   5.  Either Claritin (Loratadine), Allegra (Fexofenadine), or Zyrtec (Cetirizine) in the day  6.  Flonase (Fluticasone) 2x each nostril twice a day for two weeks, then once each nostril once a day  7. Take antibiotic as directed. Take daily probiotic (ex. Culturelle) and yogurt (ex. Activia or greek yogurt) while on antibiotic and continue for 10 days after completion of antibiotic.       Follow up with primary care provider with any problems, questions or concerns or if symptoms worsen or fail to improve. Patient agreed to plan and verbalized understanding.     Truman Castellon is a 63 year old female who presents to clinic today for the following health issues:  Chief Complaint   Patient presents with    Urgent Care    Cough     2 wks with a cough. Today having sinus pressure, feeling fatigue, right ear pain     Cough  This is a new problem. The current episode started more than 1 week ago. The problem occurs constantly. The problem has been gradually worsening. The cough is Productive of sputum. There has been no fever. Associated symptoms include ear congestion, headaches, rhinorrhea and shortness of breath. She has tried  decongestants for the symptoms. She is not a smoker.           Review of Systems   HENT:  Positive for rhinorrhea.    Respiratory:  Positive for cough and shortness of breath.    Neurological:  Positive for headaches.       Problem List:  2017-11: Pure hypercholesterolemia  2017-11: Benign essential hypertension  2017-11: S/P abdominal hysterectomy and left salpingo-oophorectomy      Past Medical History:   Diagnosis Date    Abnormal uterine bleeding     Essential hypertension     Hyperlipidemia     Kidney stone     Nephrolithiasis     Varicella        Social History     Tobacco Use    Smoking status: Never     Passive exposure: Never    Smokeless tobacco: Never   Substance Use Topics    Alcohol use: Yes     Alcohol/week: 1.0 standard drink of alcohol           Objective    BP (!) 156/88   Pulse 111   Temp 96.8  F (36  C) (Tympanic)   SpO2 99%   Physical Exam  Constitutional:       Appearance: Normal appearance.   HENT:      Head: Normocephalic and atraumatic.      Right Ear: Tympanic membrane, ear canal and external ear normal.      Left Ear: Tympanic membrane, ear canal and external ear normal.      Nose: Congestion present.      Right Sinus: Maxillary sinus tenderness present.      Left Sinus: Maxillary sinus tenderness present.      Mouth/Throat:      Pharynx: No oropharyngeal exudate or posterior oropharyngeal erythema.   Eyes:      Conjunctiva/sclera: Conjunctivae normal.      Pupils: Pupils are equal, round, and reactive to light.   Cardiovascular:      Rate and Rhythm: Normal rate and regular rhythm.      Heart sounds: Normal heart sounds.   Pulmonary:      Effort: Pulmonary effort is normal.      Breath sounds: Normal breath sounds.   Musculoskeletal:      Cervical back: Normal range of motion and neck supple.   Skin:     General: Skin is warm and dry.   Neurological:      General: No focal deficit present.      Mental Status: She is alert and oriented to person, place, and time.   Psychiatric:          Mood and Affect: Mood normal.         Thought Content: Thought content normal.              Gus Lay PA-C

## 2024-04-10 ENCOUNTER — OFFICE VISIT (OUTPATIENT)
Dept: DERMATOLOGY | Facility: CLINIC | Age: 64
End: 2024-04-10
Payer: COMMERCIAL

## 2024-04-10 DIAGNOSIS — D18.01 CHERRY ANGIOMA: ICD-10-CM

## 2024-04-10 DIAGNOSIS — Z85.828 HISTORY OF NONMELANOMA SKIN CANCER: Primary | ICD-10-CM

## 2024-04-10 DIAGNOSIS — L81.4 LENTIGINES: ICD-10-CM

## 2024-04-10 DIAGNOSIS — L82.1 SEBORRHEIC KERATOSIS: ICD-10-CM

## 2024-04-10 DIAGNOSIS — D22.9 MULTIPLE BENIGN NEVI: ICD-10-CM

## 2024-04-10 DIAGNOSIS — D49.2 NEOPLASM OF SKIN: ICD-10-CM

## 2024-04-10 DIAGNOSIS — L57.0 ACTINIC KERATOSIS: ICD-10-CM

## 2024-04-10 PROCEDURE — 11102 TANGNTL BX SKIN SINGLE LES: CPT | Performed by: DERMATOLOGY

## 2024-04-10 PROCEDURE — 99213 OFFICE O/P EST LOW 20 MIN: CPT | Mod: 25 | Performed by: DERMATOLOGY

## 2024-04-10 PROCEDURE — 17000 DESTRUCT PREMALG LESION: CPT | Mod: XS | Performed by: DERMATOLOGY

## 2024-04-10 PROCEDURE — 88305 TISSUE EXAM BY PATHOLOGIST: CPT | Mod: 26 | Performed by: DERMATOLOGY

## 2024-04-10 PROCEDURE — 88305 TISSUE EXAM BY PATHOLOGIST: CPT | Mod: TC | Performed by: DERMATOLOGY

## 2024-04-10 PROCEDURE — 11103 TANGNTL BX SKIN EA SEP/ADDL: CPT | Performed by: DERMATOLOGY

## 2024-04-10 ASSESSMENT — PAIN SCALES - GENERAL: PAINLEVEL: NO PAIN (0)

## 2024-04-10 NOTE — PROGRESS NOTES
[facility] Dermatology Note  Encounter Date: Apr 10, 2024  Office Visit     Dermatology Problem List:  Last skin check 04/10/24  0. NUB's, s/p bx 04/10/24  - Central chest  - Left deltoid  1. History of nonmelanoma skin cancer  - BCC, right shin, s/p MMS 91/22  - SCC right posterior shoulder, treated in [place], [place]  - SCC right forearm, treated in [place], [place]  2. 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    ____________________________________________    Assessment & Plan:    # Neoplasm of unspecified behavior of the skin (D49.2) on the Central chest. The differential diagnosis includes rule out BCC.  - Shave biopsy performed today (see procedure note(s) below).    # Neoplasm of unspecified behavior of the skin (D49.2) on the left deltoid. The differential diagnosis includes rule out BCC.  - Shave biopsy performed today (see procedure note(s) below).    # AK, nasal bridge  - Cryotherapy performed today. See procedure note below.    # Benign lesions - SKs, cherry angiomas, lentigenes.  - No treatment required    # Multiple benign nevi.   - Monitor for ABCDEs of melanoma   - Continue sun protection - recommend SPF 30 or higher with frequent application   - Return sooner if noticing changing or symptomatic lesions    # History of NMSC. No evidence of recurrent disease.  - Continue photoprotection - recommend SPF 30 or higher with frequent reapplication  - Continue yearly skin exams  - Advised to monitor for changing, non-healing, bleeding, painful, changing, or otherwise symptomatic lesions    Procedures Performed:   - Cryotherapy procedure note, location(s): see above. After verbal consent and discussion of risks and benefits including, but not limited to, dyspigmentation/scar, blister, and pain, 1 lesion(s) was(were) treated with 1-2 mm freeze border for 1-2 cycles with liquid nitrogen. Post cryotherapy  instructions were provided.    - Shave biopsy procedure note, location(s): see above. After discussion of benefits and risks including but not limited to bleeding, infection, scar, incomplete removal, recurrence, and non-diagnostic biopsy, written consent and photographs were obtained. The area was cleaned with isopropyl alcohol. 0.5mL of 1% lidocaine with epinephrine was injected to obtain adequate anesthesia of lesion(s). Shave biopsy at site(s) performed. Hemostasis was achieved with aluminium chloride. Petrolatum ointment and a sterile dressing were applied. The patient tolerated the procedure and no complications were noted. The patient was provided with verbal and written post care instructions.     Follow-up: 1 year(s) in-person, or earlier for new or changing lesions    Staff and Scribe:     Scribe Disclosure:   I, KIRTI GARCIA, am serving as a scribe; to document services personally performed by Renetta Haynes MD -based on data collection and the provider's statements to me.    Provider Disclosure:   The documentation recorded by the scribe accurately reflects the services I personally performed and the decisions made by me.    Renetta Haynes MD    Department of Dermatology  [facility]: Phone: [phone], Fax: [phone]  4/17/2024     ____________________________________________    CC: Skin Check (Patient reports lesion of concern on their chest and hairline. The patient would like a FBSE. )    HPI:  Ms. Cande Curiel is a(n) 63 year old female who presents today as a return patient for FBSE.    She has a spot on her chest and hair line that she would like to be checked.       Patient is otherwise feeling well, without additional skin concerns.    Labs Reviewed:  N/A    Physical Exam:  Vitals: There were no vitals taken for this visit.  SKIN: Total skin excluding the undergarment areas was  performed. The exam included the head/face, neck, both arms, chest, back, abdomen, both legs, digits and/or nails.   - Central chest, tender pink scaly macule.  - Left deltoid, tender pink scaly macule. Rule out BCC.  - There is an erythematous macule with overyling adherent scale on the nasal bridge.  - There are dome shaped bright red papules on the trunk and extremities .   - Multiple regular brown pigmented macules and papules are identified on the trunk and extremities.   - Scattered brown macules on sun exposed areas.  - Waxy stuck on papules and plaques on trunk and extremities.   - There is no erythema, telangectasias, nodularity, or pigmentation on the site of prior nmsc  - No other lesions of concern on areas examined.     Medications:  Current Outpatient Medications   Medication Sig Dispense Refill    atorvastatin (LIPITOR) 10 MG tablet Take 1 tablet (10 mg) by mouth daily 90 tablet 3    Blood Pressure Monitoring (ADULT BLOOD PRESSURE CUFF LG) KIT 1 each daily 1 kit 0    fluticasone (FLONASE) 50 MCG/ACT nasal spray Spray 1 spray into both nostrils daily 16 g 0    lisinopril-hydrochlorothiazide (ZESTORETIC) 20-12.5 MG tablet Take 1 tablet by mouth daily 90 tablet 3     No current facility-administered medications for this visit.      Past Medical History:   Patient Active Problem List   Diagnosis    Pure hypercholesterolemia    Benign essential hypertension    S/P abdominal hysterectomy and left salpingo-oophorectomy     Past Medical History:   Diagnosis Date    Abnormal uterine bleeding     Essential hypertension     Hyperlipidemia     Kidney stone     Nephrolithiasis     Varicella         CC Referred Self, MD  No address on file on close of this encounter.

## 2024-04-10 NOTE — PATIENT INSTRUCTIONS
Checking for Skin Cancer  You can help find cancer early by checking your skin each month. There are 3 main kinds of skin cancer: melanoma, basal cell carcinoma, and squamous cell carcinoma. Doing monthly skin checks is the best way to find new marks, sores, or skin changes. Follow these instructions for checking your skin.   The ABCDEs of checking moles for melanoma   Check your moles or growths for signs of melanoma using ABCDE:   Asymmetry: The sides of the mole or growth don t match.  Border: The edges are ragged, notched, or blurred.  Color: The color within the mole or growth varies. It could be black, brown, tan, white, or shades of red, gray, or blue.  Diameter: The mole or growth is larger than   inch or 6 mm (size of a pencil eraser).  Evolving: The size, shape, texture, or color of the mole or growth is changing.     ABCDE's of moles on light skin.        ABCDE's of moles on dark skin may be harder to identify.     Checking for other types of skin cancer  Basal cell carcinoma or squamous cell carcinoma cause symptoms like:     A spot or mole that looks different from all other marks on your skin  Changes in how an area feels, such as itching, tenderness, or pain  Changes in the skin's surface, such as oozing, bleeding, or scaliness  A sore that doesn't heal  New swelling, redness, or spread of color beyond the border of a mole    Who s at risk?  Anyone of any skin color can get skin cancer. But you're at greater risk if you have:   Fair skin that freckles easily and burns instead of tanning  Light-colored or red hair  Light-colored eyes  Many moles or abnormal moles on your skin  A long history of unprotected exposure to sunlight or tanning beds  A history of many blistering sunburns as a child or teen  A family history of skin cancer  Been exposed to radiation or chemicals  A weakened immune system  Been exposed to arsenic  If you've had skin cancer in the past, you're at high risk of having it again.    How to check your skin  Do your monthly skin checkups in front of a full-length mirror. Use a room with good lighting so it's easier to see. Use a hand mirror to look at hard-to-see places like your buttocks and back. You can also have a trusted friend or family member help you with these checks. Check every part of your body, including your:   Head (ears, face, neck, and scalp)  Torso (front, back, sides, and under breasts)  Arms (tops, undersides, and armpits)  Hands (palms, backs, and fingers, including under the nails)  Lower back, buttocks, and genitals  Legs (front, back, and sides)  Feet (tops, soles, toes, including under the nails, and between toes)  Watch for new spots on your skin or a spot that's changing in color, shape, size.   If you have a lot of moles, take digital photos of them each month. Make sure to take photos both up close and from a distance. These can help you see if any moles change over time.   Know your skin  Most skin changes aren't cancer. But if you see any changes in your skin, call your healthcare provider right away. Only they can tell you if a change is a problem. If you have skin cancer, seeing your provider can be the first step to getting the treatment that could save your life.   Evelin last reviewed this educational content on 10/1/2021    0995-8520 [employer]. All rights reserved. This information is not intended as a substitute for professional medical care. Always follow your healthcare professional's instructions.     Cryotherapy    What is it?  Use of a very cold liquid, such as liquid nitrogen, to freeze and destroy abnormal skin cells that need to be removed    What should I expect?  Tenderness and redness  A small blister that might grow and fill with dark purple blood. There may be crusting.  More than one treatment may be needed if the lesions do not go away.    How do I care for the treated area?  Gently wash the area with your hands when  bathing.  Use a thin layer of Vaseline to help with healing. You may use a Band-Aid.   The area should heal within 7-10 days and may leave behind a pink or lighter color.   Do not use an antibiotic or Neosporin ointment.   You may take acetaminophen (Tylenol) for pain.     Call your doctor if you have:  Severe pain  Signs of infection (warmth, redness, cloudy yellow drainage, and or a bad smell)  Questions or concerns    Who should I call with questions?      [facility]: [phone]      [facility]: [phone]      For urgent needs outside of business hours call the [facility] at [phone] and ask for the dermatology resident on call Wound Care After a Biopsy    What is a skin biopsy?  A skin biopsy allows the doctor to examine a very small piece of tissue under the microscope to determine the diagnosis and the best treatment for the skin condition. A local anesthetic (numbing medicine) is injected with a very small needle into the skin area to be tested. A small piece of skin is taken from the area. Sometimes a suture (stitch) is used.     What are the risks of a skin biopsy?  I will experience scar, bleeding, swelling, pain, crusting and redness. I may experience incomplete removal or recurrence. Risks of this procedure are excessive bleeding, bruising, infection, nerve damage, numbness, thick (hypertrophic or keloidal) scar and non-diagnostic biopsy.    How should I care for my wound for the first 24 hours?  Keep the wound dry and covered for 24 hours  If it bleeds, hold direct pressure on the area for 15 minutes. If bleeding does not stop, call us or go to the emergency room  Avoid strenuous exercise the first 1-2 days or as your doctor instructs you    How should I care for the wound after 24 hours?  After 24 hours, remove the bandage  You may bathe or shower as normal  If you had a scalp biopsy, you can shampoo as usual and can use  shower water to clean the biopsy site daily  Clean the wound once a day with gentle soap and water  Do not scrub, be gentle  Apply white petroleum/Vaseline after cleaning the wound with a cotton swab or a clean finger, and keep the site covered with a Bandaid /bandage. Bandages are not necessary with a scalp biopsy  If you are unable to cover the site with a Bandaid /bandage, re-apply ointment 2-3 times a day to keep the site moist. Moisture will help with healing  Avoid strenuous activity for first 1-2 days  Avoid lakes, rivers, pools, and oceans until the stitches are removed or the site is healed    How do I clean my wound?  Wash hands thoroughly with soap or use hand  before all wound care  Clean the wound with gentle soap and water  Apply white petroleum/Vaseline  to wound after it is clean  Replace the Bandaid /bandage to keep the wound covered for the first few days or as instructed by your doctor  If you had a scalp biopsy, warm shower water to the area on a daily basis should suffice    What should I use to clean my wound?   Cotton-tipped applicators (Qtips )  White petroleum jelly (Vaseline ). Use a clean new container and use Q-tips to apply.  Bandaids  as needed  Gentle soap     How should I care for my wound long term?  Do not get your wound dirty  Keep up with wound care for one week or until the area is healed.  If you have stitches, stitches need to be removed in 14 days. You may return to our clinic for this or you may have it done locally at your doctor s office.  A small scab will form and fall off by itself when the area is completely healed. The area will be red and will become pink in color as it heals. Sun protection is very important for how your scar will turn out. Sunscreen with an SPF 30 or greater is recommended once the area is healed.  You should have some soreness but it should be mild and slowly go away over several days. Talk to your doctor about using tylenol for  pain,    When should I call my doctor?  If you have increased:   Pain or swelling  Pus or drainage (clear or slightly yellow drainage is ok)  Temperature over 100F  Spreading redness or warmth around wound    When will I hear about my results?  The biopsy results can take 2 weeks to come back.  Your results will automatically release to [facility] before your provider has even reviewed them.  The clinic will call you with the results, send you a [facility] message, or have you schedule a follow-up clinic or phone time to discuss the results.  Contact our clinics if you do not hear from us in 2 weeks.    Who should I call with questions?  [facility]: [phone]  [facility]: [phone]  For urgent needs outside of business hours call the [facility] at [phone] and ask for the dermatology resident on call

## 2024-04-10 NOTE — LETTER
4/10/2024       RE: Cande Curiel  [address] [place] [address]     Dear Colleague,    Thank you for referring your patient, Cande Curiel, to the [facility] DERMATOLOGY CLINIC [place] at [facility]. Please see a copy of my visit note below.    [facility] Dermatology Note  Encounter Date: Apr 10, 2024  Office Visit     Dermatology Problem List:  Last skin check 04/10/24  0. NUB's, s/p bx 04/10/24  - Central chest  - Left deltoid  1. History of nonmelanoma skin cancer  - BCC, right shin, s/p MMS 91/22  - SCC right posterior shoulder, treated in [place], [place]  - SCC right forearm, treated in [place], [place]  2. 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    ____________________________________________    Assessment & Plan:    # Neoplasm of unspecified behavior of the skin (D49.2) on the Central chest. The differential diagnosis includes rule out BCC.  - Shave biopsy performed today (see procedure note(s) below).    # Neoplasm of unspecified behavior of the skin (D49.2) on the left deltoid. The differential diagnosis includes rule out BCC.  - Shave biopsy performed today (see procedure note(s) below).    # AK, nasal bridge  - Cryotherapy performed today. See procedure note below.    # Benign lesions - SKs, cherry angiomas, lentigenes.  - No treatment required    # Multiple benign nevi.   - Monitor for ABCDEs of melanoma   - Continue sun protection - recommend SPF 30 or higher with frequent application   - Return sooner if noticing changing or symptomatic lesions    # History of NMSC. No evidence of recurrent disease.  - Continue photoprotection - recommend SPF 30 or higher with frequent reapplication  - Continue yearly skin exams  - Advised to monitor for changing, non-healing, bleeding, painful, changing, or otherwise symptomatic lesions    Procedures  Performed:   - Cryotherapy procedure note, location(s): see above. After verbal consent and discussion of risks and benefits including, but not limited to, dyspigmentation/scar, blister, and pain, 1 lesion(s) was(were) treated with 1-2 mm freeze border for 1-2 cycles with liquid nitrogen. Post cryotherapy instructions were provided.    - Shave biopsy procedure note, location(s): see above. After discussion of benefits and risks including but not limited to bleeding, infection, scar, incomplete removal, recurrence, and non-diagnostic biopsy, written consent and photographs were obtained. The area was cleaned with isopropyl alcohol. 0.5mL of 1% lidocaine with epinephrine was injected to obtain adequate anesthesia of lesion(s). Shave biopsy at site(s) performed. Hemostasis was achieved with aluminium chloride. Petrolatum ointment and a sterile dressing were applied. The patient tolerated the procedure and no complications were noted. The patient was provided with verbal and written post care instructions.     Follow-up: 1 year(s) in-person, or earlier for new or changing lesions    Staff and Scribe:     Scribe Disclosure:   I, KIRTI GARCIA, am serving as a scribe; to document services personally performed by Renetta Haynes MD -based on data collection and the provider's statements to me.    Provider Disclosure:   The documentation recorded by the scribe accurately reflects the services I personally performed and the decisions made by me.    Renetta Haynes MD    Department of Dermatology  [facility]: Phone: [phone], Fax: [phone]  4/17/2024     ____________________________________________    CC: Skin Check (Patient reports lesion of concern on their chest and hairline. The patient would like a FBSE. )    HPI:  Ms. Cande Curile is a(n) 63 year old female who presents today as a return patient for  FBSE.    She has a spot on her chest and hair line that she would like to be checked.       Patient is otherwise feeling well, without additional skin concerns.    Labs Reviewed:  N/A    Physical Exam:  Vitals: There were no vitals taken for this visit.  SKIN: Total skin excluding the undergarment areas was performed. The exam included the head/face, neck, both arms, chest, back, abdomen, both legs, digits and/or nails.   - Central chest, tender pink scaly macule.  - Left deltoid, tender pink scaly macule. Rule out BCC.  - There is an erythematous macule with overyling adherent scale on the nasal bridge.  - There are dome shaped bright red papules on the trunk and extremities .   - Multiple regular brown pigmented macules and papules are identified on the trunk and extremities.   - Scattered brown macules on sun exposed areas.  - Waxy stuck on papules and plaques on trunk and extremities.   - There is no erythema, telangectasias, nodularity, or pigmentation on the site of prior nmsc  - No other lesions of concern on areas examined.     Medications:  Current Outpatient Medications   Medication Sig Dispense Refill    atorvastatin (LIPITOR) 10 MG tablet Take 1 tablet (10 mg) by mouth daily 90 tablet 3    Blood Pressure Monitoring (ADULT BLOOD PRESSURE CUFF LG) KIT 1 each daily 1 kit 0    fluticasone (FLONASE) 50 MCG/ACT nasal spray Spray 1 spray into both nostrils daily 16 g 0    lisinopril-hydrochlorothiazide (ZESTORETIC) 20-12.5 MG tablet Take 1 tablet by mouth daily 90 tablet 3     No current facility-administered medications for this visit.      Past Medical History:   Patient Active Problem List   Diagnosis    Pure hypercholesterolemia    Benign essential hypertension    S/P abdominal hysterectomy and left salpingo-oophorectomy     Past Medical History:   Diagnosis Date    Abnormal uterine bleeding     Essential hypertension     Hyperlipidemia     Kidney stone     Nephrolithiasis     Varicella         CC Referred  Self, MD  No address on file on close of this encounter.

## 2024-04-10 NOTE — NURSING NOTE
Lidocaine-epinephrine 1-1:223275 % injection   1 mL once for one use, starting 4/10/2024 ending 4/10/2024,  2mL disp, R-0, injection  Injected by Florida Oropeza LPN

## 2024-04-10 NOTE — NURSING NOTE
Chief Complaint   Patient presents with    Skin Check     Patient reports lesion of concern on their chest and hairline. The patient would like a FBSE.      Florida Oropeza LPN

## 2024-04-11 ENCOUNTER — MYC MEDICAL ADVICE (OUTPATIENT)
Dept: DERMATOLOGY | Facility: CLINIC | Age: 64
End: 2024-04-11
Payer: COMMERCIAL

## 2024-04-12 ENCOUNTER — TELEPHONE (OUTPATIENT)
Dept: DERMATOLOGY | Facility: CLINIC | Age: 64
End: 2024-04-12
Payer: COMMERCIAL

## 2024-04-12 DIAGNOSIS — C44.91 BASAL CELL CARCINOMA: Primary | ICD-10-CM

## 2024-04-12 NOTE — TELEPHONE ENCOUNTER
Referral placed to derm surgery for treatment of SCCis and BCC on the  central chest and left deltoid.     Florida Oropeza LPN

## 2024-04-16 ENCOUNTER — TELEPHONE (OUTPATIENT)
Dept: DERMATOLOGY | Facility: CLINIC | Age: 64
End: 2024-04-16
Payer: COMMERCIAL

## 2024-04-16 NOTE — TELEPHONE ENCOUNTER
Called patient to schedule surgery with Dr. Knight    Date of Surgery: 05/30    Surgery type: excision     Consult scheduled: Not Applicable    Has patient had mohs with us before? Not Applicable    Additional comments: guadalupe Gamboa on 4/16/2024 at 9:16 AM

## 2024-05-04 ENCOUNTER — HEALTH MAINTENANCE LETTER (OUTPATIENT)
Age: 64
End: 2024-05-04

## 2024-05-09 ENCOUNTER — LAB (OUTPATIENT)
Dept: LAB | Facility: CLINIC | Age: 64
End: 2024-05-09
Payer: COMMERCIAL

## 2024-05-09 DIAGNOSIS — I10 BENIGN ESSENTIAL HYPERTENSION: ICD-10-CM

## 2024-05-09 DIAGNOSIS — E78.00 PURE HYPERCHOLESTEROLEMIA: ICD-10-CM

## 2024-05-09 LAB
ALBUMIN SERPL BCG-MCNC: 4.7 G/DL (ref 3.5–5.2)
ALP SERPL-CCNC: 63 U/L (ref 40–150)
ALT SERPL W P-5'-P-CCNC: 40 U/L (ref 0–50)
ANION GAP SERPL CALCULATED.3IONS-SCNC: 8 MMOL/L (ref 7–15)
AST SERPL W P-5'-P-CCNC: 41 U/L (ref 0–45)
BILIRUB SERPL-MCNC: 0.6 MG/DL
BUN SERPL-MCNC: 12.6 MG/DL (ref 8–23)
CALCIUM SERPL-MCNC: 9.2 MG/DL (ref 8.8–10.2)
CHLORIDE SERPL-SCNC: 102 MMOL/L (ref 98–107)
CHOLEST SERPL-MCNC: 168 MG/DL
CREAT SERPL-MCNC: 0.67 MG/DL (ref 0.51–0.95)
DEPRECATED HCO3 PLAS-SCNC: 28 MMOL/L (ref 22–29)
EGFRCR SERPLBLD CKD-EPI 2021: >90 ML/MIN/1.73M2
ERYTHROCYTE [DISTWIDTH] IN BLOOD BY AUTOMATED COUNT: 12.6 % (ref 10–15)
FASTING STATUS PATIENT QL REPORTED: YES
FASTING STATUS PATIENT QL REPORTED: YES
GLUCOSE SERPL-MCNC: 104 MG/DL (ref 70–99)
HBA1C MFR BLD: 5.6 %
HCT VFR BLD AUTO: 40.3 % (ref 35–47)
HDLC SERPL-MCNC: 63 MG/DL
HGB BLD-MCNC: 13.6 G/DL (ref 11.7–15.7)
LDLC SERPL CALC-MCNC: 91 MG/DL
MCH RBC QN AUTO: 29.6 PG (ref 26.5–33)
MCHC RBC AUTO-ENTMCNC: 33.7 G/DL (ref 31.5–36.5)
MCV RBC AUTO: 88 FL (ref 78–100)
NONHDLC SERPL-MCNC: 105 MG/DL
PLATELET # BLD AUTO: 241 10E3/UL (ref 150–450)
POTASSIUM SERPL-SCNC: 3.8 MMOL/L (ref 3.4–5.3)
PROT SERPL-MCNC: 6.7 G/DL (ref 6.4–8.3)
RBC # BLD AUTO: 4.59 10E6/UL (ref 3.8–5.2)
SODIUM SERPL-SCNC: 138 MMOL/L (ref 135–145)
TRIGL SERPL-MCNC: 69 MG/DL
TSH SERPL DL<=0.005 MIU/L-ACNC: 2.61 UIU/ML (ref 0.3–4.2)
WBC # BLD AUTO: 4.3 10E3/UL (ref 4–11)

## 2024-05-09 PROCEDURE — 83718 ASSAY OF LIPOPROTEIN: CPT

## 2024-05-09 PROCEDURE — 36415 COLL VENOUS BLD VENIPUNCTURE: CPT

## 2024-05-09 PROCEDURE — 82465 ASSAY BLD/SERUM CHOLESTEROL: CPT

## 2024-05-09 PROCEDURE — 85027 COMPLETE CBC AUTOMATED: CPT

## 2024-05-09 PROCEDURE — 82040 ASSAY OF SERUM ALBUMIN: CPT

## 2024-05-09 PROCEDURE — 84443 ASSAY THYROID STIM HORMONE: CPT

## 2024-05-09 PROCEDURE — 82374 ASSAY BLOOD CARBON DIOXIDE: CPT

## 2024-05-09 PROCEDURE — 83036 HEMOGLOBIN GLYCOSYLATED A1C: CPT

## 2024-05-13 ENCOUNTER — OFFICE VISIT (OUTPATIENT)
Dept: INTERNAL MEDICINE | Facility: CLINIC | Age: 64
End: 2024-05-13
Attending: INTERNAL MEDICINE
Payer: COMMERCIAL

## 2024-05-13 VITALS
HEART RATE: 105 BPM | BODY MASS INDEX: 22.68 KG/M2 | SYSTOLIC BLOOD PRESSURE: 154 MMHG | HEIGHT: 63 IN | WEIGHT: 128 LBS | DIASTOLIC BLOOD PRESSURE: 85 MMHG

## 2024-05-13 DIAGNOSIS — I10 BENIGN ESSENTIAL HYPERTENSION: ICD-10-CM

## 2024-05-13 DIAGNOSIS — Z00.00 PREVENTATIVE HEALTH CARE: Primary | ICD-10-CM

## 2024-05-13 DIAGNOSIS — E78.00 PURE HYPERCHOLESTEROLEMIA: ICD-10-CM

## 2024-05-13 PROCEDURE — 99396 PREV VISIT EST AGE 40-64: CPT | Performed by: INTERNAL MEDICINE

## 2024-05-13 PROCEDURE — 99213 OFFICE O/P EST LOW 20 MIN: CPT | Performed by: INTERNAL MEDICINE

## 2024-05-13 NOTE — PATIENT INSTRUCTIONS
Thank you for trusting us with your care!     If you need to contact us for questions about:  Symptoms, Scheduling & Medical Questions; Non-urgent (2-3 day response) Jose Miguel message, Urgent (needing response today) [phone] (if after 3:30pm next day response)   Prescriptions: Please call your Pharmacy   Billing: Shae [phone] or [facility]:[phone]

## 2024-05-13 NOTE — LETTER
5/13/2024       RE: Cande Curiel  [address] [place] [address]     Dear Colleague,    Thank you for referring your patient, Cande Curiel, to the [facility] WOMEN'S CLINIC [place] at [facility]. Please see a copy of my visit note below.    Preventive Care Visit  [facility]  Analilia Wiley MD, Internal Medicine  May 13, 2024      Assessment & Plan     Preventative health care  Patient is up-to-date on vaccines.  Discussed age-appropriate cancer screening.  Patient is up-to-date on colon, breast, and cervical cancer screening.    Pure hypercholesterolemia  Recent cholesterol is within acceptable range.  Recommend to continue with current medical regimen.    Benign essential hypertension  Blood pressure is elevated today.  However patient has normal blood pressures at home.  Recommend to continue with current regimen and periodic checks of home blood pressure.  Patient will call the clinic if her blood pressures become consistently elevated.        No follow-ups on file.      Subjective   Cande is a 63 year old, presenting for the following:  Annual Visit (Med check)    Patient is here for preventive visit. She reports that her blood pressure at home has been within acceptable range. She denies headaches, chest pain, dizziness, dyspnea on exertion.      Health Care Directive  Patient does not have a Health Care Directive or Living Will: Advance Directive received and scanned. Click on Code in the patient header to view.    HPI  Patient is here for follow-up on hypertension in addition to preventive exam.  She reports that her home blood pressures have been within acceptable range.  She denies side effects related to medical therapy.  She has been exercising on a regular basis and has healthy diet.            2/12/2024   General Health   How would you rate your overall physical health? Good   Feel  stress (tense, anxious, or unable to sleep) Not at all         2/12/2024   Nutrition   Three or more servings of calcium each day? Yes   Diet: Regular (no restrictions)   How many servings of fruit and vegetables per day? (!) 0-1   How many sweetened beverages each day? 0-1         2/12/2024   Exercise   Days per week of moderate/strenous exercise 6 days   Average minutes spent exercising at this level 60 min         2/19/2024   Social Factors   Worry food won't last until get money to buy more No   Food not last or not have enough money for food? No   Do you have housing?  Yes   Are you worried about losing your housing? No   Lack of transportation? No   Unable to get utilities (heat,electricity)? No         2/12/2024   Dental   Dentist two times every year? Yes         2/12/2024   TB Screening   Were you born outside of the [place]? (!) YES                 2/12/2024   Substance Use   Alcohol more than 3/day or more than 7/wk No   Do you use any other substances recreationally? No     Social History     Tobacco Use    Smoking status: Never     Passive exposure: Never    Smokeless tobacco: Never   Substance Use Topics    Alcohol use: Yes     Alcohol/week: 1.0 standard drink of alcohol    Drug use: No           12/27/2023   LAST FHS-7 RESULTS   1st degree relative breast or ovarian cancer No   Any relative bilateral breast cancer No   Any male have breast cancer No   Any ONE woman have BOTH breast AND ovarian cancer No   Any woman with breast cancer before 50yrs No   2 or more relatives with breast AND/OR ovarian cancer No   2 or more relatives with breast AND/OR bowel cancer No        Mammogram Screening - Mammogram every 1-2 years updated in Health Maintenance based on mutual decision making      History of abnormal Pap smear:        ASCVD Risk   The 10-year ASCVD risk score (Amber REED, et al., 2019) is: 5.7%    Values used to calculate the score:      Age: 63 years      Sex: Female      Is Non-   "American: No      Diabetic: No      Tobacco smoker: No      Systolic Blood Pressure: 154 mmHg      Is BP treated: Yes      HDL Cholesterol: 63 mg/dL      Total Cholesterol: 168 mg/dL           Reviewed and updated as needed this visit by Provider                    Past Medical History:   Diagnosis Date    Abnormal uterine bleeding     Essential hypertension     Hyperlipidemia     Kidney stone     Nephrolithiasis     Varicella      Past Surgical History:   Procedure Laterality Date    BIOPSY      BREAST SURGERY      ENT SURGERY      KNEE SURGERY      ORTHOPEDIC SURGERY      ZZC VAGINAL HYSTERECTOMY             Objective    Exam  BP (!) 154/85   Pulse 105   Ht 1.6 m (5' 3\")   Wt 58.1 kg (128 lb)   BMI 22.67 kg/m     Estimated body mass index is 22.67 kg/m  as calculated from the following:    Height as of this encounter: 1.6 m (5' 3\").    Weight as of this encounter: 58.1 kg (128 lb).    Physical Exam  GENERAL: alert and no distress  EYES: Eyes grossly normal to inspection, PERRL and conjunctivae and sclerae normal  RESP: lungs clear to auscultation - no rales, rhonchi or wheezes  CV: regular rate and rhythm, normal S1 S2, no S3 or S4, no murmur, click or rub, no peripheral edema  ABDOMEN: soft, nontender and bowel sounds normal  MS: no gross musculoskeletal defects noted, no edema  SKIN: no suspicious lesions or rashes  PSYCH: mentation appears normal, affect normal/bright        Signed Electronically by: Analilia Wiley MD    "

## 2024-05-13 NOTE — PROGRESS NOTES
Preventive Care Visit  [facility]  Analilia Wiley MD, Internal Medicine  May 13, 2024      Assessment & Plan     Preventative health care  Patient is up-to-date on vaccines.  Discussed age-appropriate cancer screening.  Patient is up-to-date on colon, breast, and cervical cancer screening.    Pure hypercholesterolemia  Recent cholesterol is within acceptable range.  Recommend to continue with current medical regimen.    Benign essential hypertension  Blood pressure is elevated today.  However patient has normal blood pressures at home.  Recommend to continue with current regimen and periodic checks of home blood pressure.  Patient will call the clinic if her blood pressures become consistently elevated.        No follow-ups on file.      Truman Castellon is a 63 year old, presenting for the following:  Annual Visit (Med check)    Patient is here for preventive visit. She reports that her blood pressure at home has been within acceptable range. She denies headaches, chest pain, dizziness, dyspnea on exertion.      Health Care Directive  Patient does not have a Health Care Directive or Living Will: Advance Directive received and scanned. Click on Code in the patient header to view.    HPI  Patient is here for follow-up on hypertension in addition to preventive exam.  She reports that her home blood pressures have been within acceptable range.  She denies side effects related to medical therapy.  She has been exercising on a regular basis and has healthy diet.            2/12/2024   General Health   How would you rate your overall physical health? Good   Feel stress (tense, anxious, or unable to sleep) Not at all         2/12/2024   Nutrition   Three or more servings of calcium each day? Yes   Diet: Regular (no restrictions)   How many servings of fruit and vegetables per day? (!) 0-1   How many sweetened beverages each day? 0-1         2/12/2024   Exercise   Days  per week of moderate/strenous exercise 6 days   Average minutes spent exercising at this level 60 min         2/19/2024   Social Factors   Worry food won't last until get money to buy more No   Food not last or not have enough money for food? No   Do you have housing?  Yes   Are you worried about losing your housing? No   Lack of transportation? No   Unable to get utilities (heat,electricity)? No         2/12/2024   Dental   Dentist two times every year? Yes         2/12/2024   TB Screening   Were you born outside of the [place]? (!) YES                 2/12/2024   Substance Use   Alcohol more than 3/day or more than 7/wk No   Do you use any other substances recreationally? No     Social History     Tobacco Use     Smoking status: Never     Passive exposure: Never     Smokeless tobacco: Never   Substance Use Topics     Alcohol use: Yes     Alcohol/week: 1.0 standard drink of alcohol     Drug use: No           12/27/2023   LAST FHS-7 RESULTS   1st degree relative breast or ovarian cancer No   Any relative bilateral breast cancer No   Any male have breast cancer No   Any ONE woman have BOTH breast AND ovarian cancer No   Any woman with breast cancer before 50yrs No   2 or more relatives with breast AND/OR ovarian cancer No   2 or more relatives with breast AND/OR bowel cancer No        Mammogram Screening - Mammogram every 1-2 years updated in Health Maintenance based on mutual decision making      History of abnormal Pap smear:        ASCVD Risk   The 10-year ASCVD risk score (Amber REED, et al., 2019) is: 5.7%    Values used to calculate the score:      Age: 63 years      Sex: Female      Is Non- : No      Diabetic: No      Tobacco smoker: No      Systolic Blood Pressure: 154 mmHg      Is BP treated: Yes      HDL Cholesterol: 63 mg/dL      Total Cholesterol: 168 mg/dL           Reviewed and updated as needed this visit by Provider                    Past Medical History:   Diagnosis Date  "    Abnormal uterine bleeding      Essential hypertension      Hyperlipidemia      Kidney stone      Nephrolithiasis      Varicella      Past Surgical History:   Procedure Laterality Date     BIOPSY       BREAST SURGERY       ENT SURGERY       KNEE SURGERY       ORTHOPEDIC SURGERY       ZZC VAGINAL HYSTERECTOMY              Objective    Exam  BP (!) 154/85   Pulse 105   Ht 1.6 m (5' 3\")   Wt 58.1 kg (128 lb)   BMI 22.67 kg/m     Estimated body mass index is 22.67 kg/m  as calculated from the following:    Height as of this encounter: 1.6 m (5' 3\").    Weight as of this encounter: 58.1 kg (128 lb).    Physical Exam  GENERAL: alert and no distress  EYES: Eyes grossly normal to inspection, PERRL and conjunctivae and sclerae normal  RESP: lungs clear to auscultation - no rales, rhonchi or wheezes  CV: regular rate and rhythm, normal S1 S2, no S3 or S4, no murmur, click or rub, no peripheral edema  ABDOMEN: soft, nontender and bowel sounds normal  MS: no gross musculoskeletal defects noted, no edema  SKIN: no suspicious lesions or rashes  PSYCH: mentation appears normal, affect normal/bright        Signed Electronically by: Analilia Wiley MD    "

## 2024-05-29 NOTE — PROGRESS NOTES
Problem: Patient Care Overview  Goal: Plan of Care Review  2/19/2020 1140 by Reece Rocha PTA  Outcome: Ongoing (interventions implemented as appropriate)  Flowsheets (Taken 2/19/2020 1027)  Progress: no change  Plan of Care Reviewed With: patient  Outcome Summary: Pt tolerated B LE ex's well with good participation. Pt declined out of chair activity due to may be DC soon to go to rehab center in [place]. Pt would cont to benefit from therapy upon DC.      DERMATOLOGY EXCISION PROCEDURE NOTE    Dermatology Problem List:    Last skin check 04/10/24    1. NMSC  - SCCis, central chest, s/p shave 4/10/24, s/p exc 5/30/24  - sBCC, left deltoid, s/p shave 4/10/24, s/p exc 5/30/24  - BCC, right shin, s/p MMS 91/22  - SCC right posterior shoulder, treated in [place], [place]  - SCC right forearm, treated in [place], [place]  2. 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    Case 1:   NAME OF PROCEDURE: Excision intermediate layered linear closure  Staff surgeon: Gaston Knight MD  Resident: none  Scrub Nurse: Ilia    PRE-OPERATIVE DIAGNOSIS:  Squamous cell carcinoma in situ  POST-OPERATIVE DIAGNOSIS: Same   LOCATION: central chest   FINAL EXCISION SIZE(DEFECT SIZE): 2.1 cm  MARGIN: 0.4 cm  FINAL REPAIR LENGTH: 3.5 cm   ANESTHESIA: 6 Lidocaine 1% with epi 1 : 100,000      INDICATIONS: This patient presented with a 1.3cm Squamous cell carcinoma in situ. Excision was indicated. We discussed the principles of treatment and most likely complications including scarring, bleeding, infection, incomplete excision, wound dehiscence, pain, nerve damage, and recurrence. Informed consent was obtained and the patient underwent the procedure as follows:    PROCEDURE: The patient was taken to the operative suite. Time-out was performed.  The treatment area was anesthetized with 1% lidocaine with epinephrine. The area was prepped with Chlorhexidine and rinsed with sterile saline and draped with sterile towels. The lesion was delineated and excised down to subcutaneous fat in a elliptical manner. Hemostasis was obtained by electrocoagulation.     REPAIR: An intermediate layered linear closure was selected as the procedure which would maximally preserve both function and cosmesis.    After the excision of the tumor, the area was carefully undermined. Hemostasis was obtained with electrocoagulation.  Closure was oriented so  that the wound was in the patient's natural skin tension lines. The subcutaneous and dermal layers were then closed with 4-0 monocryl sutures. The epidermis was then carefully approximated along the length of the wound using 4-0 monocryl  running subcuticular sutures.     Estimated blood loss was less than 10 ml for all surgical sites. A sterile pressure dressing was applied and wound care instructions, with a written handout, were given. The patient was [facility] alert and ambulatory.    The patient elected for pathology results to automatically release and understands that the clinical staff will contact them as soon as possible to notify them of the results.    Follow-up in n/a weeks for suture removal.    Dr. Gaston Knight was immediately available for the entire surgery and was physicially present for the key portions of the procedure.    Anatomic Pathology Results: pending    Clinical Follow-Up: PRN    Case 2:   NAME OF PROCEDURE: Excision intermediate layered linear closure  Staff surgeon: Gaston Knight MD  Resident: none  Scrub Nurse: Ilia    PRE-OPERATIVE DIAGNOSIS:  Basal Cell Carcinoma  POST-OPERATIVE DIAGNOSIS: Same   LOCATION: L Deltoid  FINAL EXCISION SIZE(DEFECT SIZE): 1.6 cm  MARGIN: 0.4 cm  FINAL REPAIR LENGTH: 3 cm   ANESTHESIA: 6 Lidocaine 1% with epi 1 : 100,000      INDICATIONS: This patient presented with a 0.8cm Basal Cell Carcinoma. Excision was indicated. We discussed the principles of treatment and most likely complications including scarring, bleeding, infection, incomplete excision, wound dehiscence, pain, nerve damage, and recurrence. Informed consent was obtained and the patient underwent the procedure as follows:    PROCEDURE: The patient was taken to the operative suite. Time-out was performed.  The treatment area was anesthetized with 1% lidocaine with epinephrine. The area was prepped with Chlorhexidine and rinsed with sterile saline and draped with  sterile towels. The lesion was delineated and excised down to subcutaneous fat in a elliptical manner. Hemostasis was obtained by electrocoagulation.     REPAIR: An intermediate layered linear closure was selected as the procedure which would maximally preserve both function and cosmesis.    After the excision of the tumor, the area was carefully undermined. Hemostasis was obtained with electrocoagulation.  Closure was oriented so that the wound was in the patient's natural skin tension lines. The subcutaneous and dermal layers were then closed with 4-0 monocryl sutures. The epidermis was then carefully approximated along the length of the wound using 4-0 monocryl  running subcuticular sutures.     Estimated blood loss was less than 10 ml for all surgical sites. A sterile pressure dressing was applied and wound care instructions, with a written handout, were given. The patient was discharged from the [facility] alert and ambulatory.    The patient elected for pathology results to automatically release and understands that the clinical staff will contact them as soon as possible to notify them of the results.    Follow-up in n/a weeks for suture removal.    Dr. Gaston Knight was immediately available for the entire surgery and was physicially present for the key portions of the procedure.    Anatomic Pathology Results: pending    Clinical Follow-Up: PRN    Staff Involved:   Scribe/Fellow/Staff    Scribe Disclosure:   I, CALVIN RIVERA, am serving as a scribe; to document services personally performed by Gaston Knight MD -based on data collection and the provider's statements to me.     Attending attestation:  I personally performed the entire procedure.  I have reviewed the note and edited it as necessary, and agree with its contents.    Gaston Knight M.D.  Professor  Director of Dermatologic Surgery  Department of Dermatology  [facility]    Dermatology Surgery Clinic  [facility]  [facility] [address] [address]

## 2024-05-30 ENCOUNTER — TELEPHONE (OUTPATIENT)
Dept: DERMATOLOGY | Facility: CLINIC | Age: 64
End: 2024-05-30

## 2024-05-30 ENCOUNTER — OFFICE VISIT (OUTPATIENT)
Dept: DERMATOLOGY | Facility: CLINIC | Age: 64
End: 2024-05-30
Payer: COMMERCIAL

## 2024-05-30 VITALS — DIASTOLIC BLOOD PRESSURE: 97 MMHG | SYSTOLIC BLOOD PRESSURE: 159 MMHG | HEART RATE: 104 BPM

## 2024-05-30 DIAGNOSIS — D04.5 SQUAMOUS CELL CARCINOMA IN SITU OF SKIN OF TRUNK: ICD-10-CM

## 2024-05-30 DIAGNOSIS — C44.619 BASAL CELL CARCINOMA (BCC) OF LEFT UPPER ARM: ICD-10-CM

## 2024-05-30 PROCEDURE — 12032 INTMD RPR S/A/T/EXT 2.6-7.5: CPT | Performed by: DERMATOLOGY

## 2024-05-30 PROCEDURE — 11602 EXC TR-EXT MAL+MARG 1.1-2 CM: CPT | Performed by: DERMATOLOGY

## 2024-05-30 PROCEDURE — 88305 TISSUE EXAM BY PATHOLOGIST: CPT | Mod: 26 | Performed by: DERMATOLOGY

## 2024-05-30 PROCEDURE — 88305 TISSUE EXAM BY PATHOLOGIST: CPT | Mod: TC | Performed by: DERMATOLOGY

## 2024-05-30 PROCEDURE — 11603 EXC TR-EXT MAL+MARG 2.1-3 CM: CPT | Performed by: DERMATOLOGY

## 2024-05-30 ASSESSMENT — PAIN SCALES - GENERAL: PAINLEVEL: NO PAIN (0)

## 2024-05-30 NOTE — TELEPHONE ENCOUNTER
Follow up call attempted--phone cut out, following excision procedure with Dr. Knight.       Are you having pain?   Are you taking pain medication?   Are you applying ice?    Have you had any noticeable bleeding through the bandage?    Do you have any other concerns?        Please call [phone] if you have any questions or concerns.

## 2024-05-30 NOTE — NURSING NOTE
Chief Complaint   Patient presents with    excision     Central chest and left deltoid     Sophia T CMA

## 2024-05-30 NOTE — PATIENT INSTRUCTIONS
Wound care    I will experience scar, bleeding, swelling, pain, crusting and redness. I may experience incomplete removal or recurrence. Risks are bleeding, bruising, swelling, infection, nerve damage, & a large wound. A second procedure may be recommended to obtain the best cosmetic or functional result.       A three month office visit with your Surgeon is recommended for scar evaluation. Please reach out sooner if you have concerns about you surgical site/wound.    Caring for your skin after surgery    After your surgery, a pressure bandage will be placed over the area that has stitches. This is important to prevent bleeding. Please follow these instructions over the next 1 to 2 weeks. Following this regimen will help to prevent complications as your wound heals.     For the first 48 hours after your surgery:    Leave the pressure dressing on and keep it dry. If it should come loose, you may re-tape it, but do not take it off.  Relax and take it easy. Do not do any vigorous exercise or heavy lifting. This could cause the wound to bleed.  Post-Operative pain is usually mild. If you are able to take tylenol, You may take plain or extra-strength Tylenol (acetaminophen) As directed on the bottle (do not take more than 4,000mg in one day). If you are able to take ibuprofen, you can alternate the tylenol and ibuprofen.   Avoid alcohol as this may increase your tendency to bleed.   You may put an ice pack around the bandaged area for 20 minutes at a time as needed. This may help reduce swelling, bruising, and pain. Make sure the ice pack is waterproof so that the pressure bandage doesn t get wet.  If the wound is on the face try to sleep with your head elevated. Either in a recliner or propped up in bed, this will decrease swelling around the eyes.   You may see a small amount of drainage or blood on your pressure bandage. This is normal. However:  If drainage or bleeding continues or saturates the bandage, you will  "need to apply firm pressure over the bandage with a piece of gauze for 15 minutes.  If bleeding continues after applying pressure for 15 minutes, apply an ice pack to the bandaged area for 15 minutes.  If bleeding still continues, call our office or go to the nearest emergency room.    Remove pressure dressing 48 hours after surgery:    Carefully remove the pressure bandage. If it seems sticky or too difficult to get off, you may need to soak it off in the shower.  After the pressure dressing is removed, you may shower and get the wound wet. However, Do Not let the forceful stream of the shower hit the wound directly.  Follow these wound care and dressing change instructions:    You have steri strips, skin glue (purplish/clear), and tegaderm (clear film) over your incision line, you can shower.   You may allow water to run over the site. Do not soak.  Do Not rub or scrub the site.  Pat dry after the shower or bath.  Avoid topical medications, lotions, creams, ointment,or oils.  Do not use tanning lamps or expose the site to sun.   Check wound appearance daily, some swelling and redness is normal after a procedure but should go away as your would is healing. If the swelling and redness or pain increases or if any other signs of infection occur listed below please send in a photo via my chart and or call us to let us know.  The clear film should start peeling off approximately around 2 weeks. By this time your wound should be sufficiently healed. If it still looks to be healing when the glue comes off you can clean the wound with soapy warm water daily in the shower and apply Vaseline to the incision line.   Once the clear film starts peeling off to the point where water is getting trapped under the clear film, please gently peel off.        If you are able to take acetaminophen (\"Tylenol,\" etc.) and ibuprofen (\"Advil\" or \"Motrin,\" etc.), then you may STAGGER these medications by taking 400 mg of ibuprofen (usually " two tabs) every 8 hours and 1,000 mg of acetaminophen (e.g., two tabs of extra-strength Tylenol) every 8 hours.    This means, for example, that you could take the followin,000 mg of Tylenol, followed 4 hours later by 400 mg Ibuprofen, followed 4 hours later with 1,000 mg of Tylenol, followed 4 hours later by 400 mg Ibuprofen, followed 4 hours later with 1,000 mg of Tylenol, and so forth.     Essentially, you can take either 1,000 mg of Tylenol or 400 mg of ibuprofen in alternating fashion EVERY FOUR HOURS.    Do NOT exceed more than 4,000 mg of Tylenol or 3,200 mg of ibuprofen per 24 hours. If you are not able to take Tylenol or ibuprofen as above due to other health issues (or a physician has told you directly that you are not allowed to take one of them, say due to pre-existing severe liver or kidney issues), then disregard the above directions.    Scientific evidence supports that this combination/schedule of pain medications is just as effective, if not more effective, than taking a narcotic pain medicine.       Follow up will be a 3 month scar evaluation either in person or via a telephone visit with you sending in a photo via [employer]. Unless you have been told to follow up sooner or if you have concerns and would like to be see sooner. Please call or send us in a [employer] message if possible and attach a photo.        What to expect:    The first couple of days your wound may be tender and may bleed slightly when doing wound care.  There may be swelling and bruising around the wound, especially if it is near the eyes. For your comfort, you may apply ice or cold compresses to the bruises after your have removed the pressure bandage.  The area around your wound may be numb for several weeks or even months.  You may experience periodic sharp pain or mild itching around the wound as it heals.   The suture line will look dark for a while but will lighten over time.       When to call us:    You have bleeding  that will not stop after applying pressure and ice.  You have pain that is not controlled with Tylenol (acetaminophen.)  You have signs or symptoms of an infection such as:  Fever over 100 degrees Fahrenheit  Redness, warmth or foul-smelling drainage from the wound  If you have any questions, or are not sure how to take care of the wound.    Phone numbers:    During business hours (M-F 8:00-4:30 p.m.)  [facility]-  [phone] Option 1 appt. Desk and ask for the Dermatology Surgery Team  [phone] [facility] .     ---------------------------------------------------------  Evenings/Weekends/Holidays  Hospital - [phone]   TTY for hearing pohfgsum-[phone]  *Ask  to page dermatologist on-call  Emergency [facility]-[phone]  TTY for hearing impaired- [phone]

## 2024-05-30 NOTE — LETTER
5/30/2024       RE: Cande Curiel  [address] [place] [address]     Dear Colleague,    Thank you for referring your patient, Cande Curiel, to the [facility] DERMATOLOGIC SURGERY CLINIC [place] at [facility]. Please see a copy of my visit note below.    DERMATOLOGY EXCISION PROCEDURE NOTE    Dermatology Problem List:    Last skin check 04/10/24    1. NMSC  - SCCis, central chest, s/p shave 4/10/24, s/p exc 5/30/24  - sBCC, left deltoid, s/p shave 4/10/24, s/p exc 5/30/24  - BCC, right shin, s/p MMS 91/22  - SCC right posterior shoulder, treated in [place], [place]  - SCC right forearm, treated in [place]  2. 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    Case 1:   NAME OF PROCEDURE: Excision intermediate layered linear closure  Staff surgeon: Gaston Knight MD  Resident: none  Scrub Nurse: Ilia    PRE-OPERATIVE DIAGNOSIS:  Squamous cell carcinoma in situ  POST-OPERATIVE DIAGNOSIS: Same   LOCATION: central chest   FINAL EXCISION SIZE(DEFECT SIZE): 2.1 cm  MARGIN: 0.4 cm  FINAL REPAIR LENGTH: 3.5 cm   ANESTHESIA: 6 Lidocaine 1% with epi 1 : 100,000      INDICATIONS: This patient presented with a 1.3cm Squamous cell carcinoma in situ. Excision was indicated. We discussed the principles of treatment and most likely complications including scarring, bleeding, infection, incomplete excision, wound dehiscence, pain, nerve damage, and recurrence. Informed consent was obtained and the patient underwent the procedure as follows:    PROCEDURE: The patient was taken to the operative suite. Time-out was performed.  The treatment area was anesthetized with 1% lidocaine with epinephrine. The area was prepped with Chlorhexidine and rinsed with sterile saline and draped with sterile towels. The lesion was delineated and excised down to subcutaneous fat in a elliptical manner. Hemostasis was  obtained by electrocoagulation.     REPAIR: An intermediate layered linear closure was selected as the procedure which would maximally preserve both function and cosmesis.    After the excision of the tumor, the area was carefully undermined. Hemostasis was obtained with electrocoagulation.  Closure was oriented so that the wound was in the patient's natural skin tension lines. The subcutaneous and dermal layers were then closed with 4-0 monocryl sutures. The epidermis was then carefully approximated along the length of the wound using 4-0 monocryl  running subcuticular sutures.     Estimated blood loss was less than 10 ml for all surgical sites. A sterile pressure dressing was applied and wound care instructions, with a written handout, were given. The patient was discharged from the Dermatologic Surgery Center alert and ambulatory.    The patient elected for pathology results to automatically release and understands that the clinical staff will contact them as soon as possible to notify them of the results.    Follow-up in n/a weeks for suture removal.    Dr. Gaston Knight was immediately available for the entire surgery and was physicially present for the key portions of the procedure.    Anatomic Pathology Results: pending    Clinical Follow-Up: PRN    Case 2:   NAME OF PROCEDURE: Excision intermediate layered linear closure  Staff surgeon: Gaston Knight MD  Resident: none  Scrub Nurse: Ilia    PRE-OPERATIVE DIAGNOSIS:  Basal Cell Carcinoma  POST-OPERATIVE DIAGNOSIS: Same   LOCATION: L Deltoid  FINAL EXCISION SIZE(DEFECT SIZE): 1.6 cm  MARGIN: 0.4 cm  FINAL REPAIR LENGTH: 3 cm   ANESTHESIA: 6 Lidocaine 1% with epi 1 : 100,000      INDICATIONS: This patient presented with a 0.8cm Basal Cell Carcinoma. Excision was indicated. We discussed the principles of treatment and most likely complications including scarring, bleeding, infection, incomplete excision, wound dehiscence, pain, nerve damage, and recurrence. Informed  consent was obtained and the patient underwent the procedure as follows:    PROCEDURE: The patient was taken to the operative suite. Time-out was performed.  The treatment area was anesthetized with 1% lidocaine with epinephrine. The area was prepped with Chlorhexidine and rinsed with sterile saline and draped with sterile towels. The lesion was delineated and excised down to subcutaneous fat in a elliptical manner. Hemostasis was obtained by electrocoagulation.     REPAIR: An intermediate layered linear closure was selected as the procedure which would maximally preserve both function and cosmesis.    After the excision of the tumor, the area was carefully undermined. Hemostasis was obtained with electrocoagulation.  Closure was oriented so that the wound was in the patient's natural skin tension lines. The subcutaneous and dermal layers were then closed with 4-0 monocryl sutures. The epidermis was then carefully approximated along the length of the wound using 4-0 monocryl  running subcuticular sutures.     Estimated blood loss was less than 10 ml for all surgical sites. A sterile pressure dressing was applied and wound care instructions, with a written handout, were given. The patient was discharged from the Dermatologic Surgery Center alert and ambulatory.    The patient elected for pathology results to automatically release and understands that the clinical staff will contact them as soon as possible to notify them of the results.    Follow-up in n/a weeks for suture removal.    Dr. Gaston Knight was immediately available for the entire surgery and was physicially present for the key portions of the procedure.    Anatomic Pathology Results: pending    Clinical Follow-Up: PRN    Staff Involved:   Scribe/Fellow/Staff    Scribe Disclosure:   ICALVIN, am serving as a scribe; to document services personally performed by Gaston Knight MD -based on data collection and the provider's statements to me.     Attending  attestation:  I personally performed the entire procedure.  I have reviewed the note and edited it as necessary, and agree with its contents.    Gaston Knight M.D.  Professor  Director of Dermatologic Surgery  Department of Dermatology  [facility]    Dermatology Surgery Clinic  [facility] [place] [id number]

## 2024-05-31 LAB
PATH REPORT.COMMENTS IMP SPEC: NORMAL
PATH REPORT.COMMENTS IMP SPEC: NORMAL
PATH REPORT.FINAL DX SPEC: NORMAL
PATH REPORT.GROSS SPEC: NORMAL
PATH REPORT.MICROSCOPIC SPEC OTHER STN: NORMAL
PATH REPORT.RELEVANT HX SPEC: NORMAL

## 2024-10-15 NOTE — PROGRESS NOTES
[facility] Dermatology Note  Encounter Date: Oct 16, 2024  Office Visit     Dermatology Problem List:  Last skin check 10/15/24    1. NMSC   SCCis, central chest, s/p shave 4/10/24, s/p exc 5/30/24  - sBCC, left deltoid, s/p shave 4/10/24, s/p exc 5/30/24  - BCC, right shin, s/p MMS 91/22  - SCC right posterior shoulder, treated in [place], [place]  - SCC right forearm, [place] in [place]  - SCC central chest, s/p MMS 05/30/2024  - BCC L upper arm, s/p MMS 05/30/2024  2. Actinic keratoses s/p cryotherapy 10/3/22, 1/23/24  - Nasal bridge, s/p cryo 04/10/24  - L lateral wrist s/p cryo 10/15/2024  3. 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    ____________________________________________    Assessment & Plan:    # AK, L lateral wrist  - cryotherapy done 10/16/2024    # Benign lesions - SKs, cherry angiomas, lentigines  - No treatment required     # milia  - extracted today, discussed the possibility of the area coming back.     # Multiple benign nevi.   - Monitor for ABCDEs of melanoma   - Continue sun protection - recommend SPF 30 or higher with frequent application   - Return sooner if noticing changing or symptomatic lesions     # History of NMSC. No evidence of recurrent disease.  - Continue photoprotection - recommend SPF 30 or higher with frequent reapplication  - Continue yearly skin exams  - Advised to monitor for changing, non-healing, bleeding, painful, changing, or otherwise symptomatic lesions    Procedures Performed:   Cryotherapy procedure note: After verbal consent and discussion of risks and benefits including but no limited to dyspigmentation/scar, blister, and pain, 1 AK was(were) treated with 1-2mm freeze border for 2 cycles with liquid nitrogen. Post cryotherapy instructions were provided.     Follow-up: 1 year in-person, or earlier for new or changing lesions    Staff and Scribe:     Scribe Disclosure:   I, Sherine Kia, am serving as a  scribe; to document services personally performed by Renetta Haynes MD -based on data collection and the provider's statements to me.     Provider Disclosure:   The documentation recorded by the scribe accurately reflects the services I personally performed and the decisions made by me.    Renetta Haynes MD    Department of Dermatology  [facility]: Phone: [phone], Fax: [phone]  10/22/2024       ____________________________________________    CC: Derm Problem (6 mo FBSE - spot on R temple of concern, forgot to mention at last visit but is unchanged)    HPI:  Ms. Cande Curiel is a(n) 63 year old female who presents today as a return patient for FBSE.    The patient reports that her MMS procedures went well and are healing well. She has one small spot on her temple that she would like checked. She also has a small white spot that she would like checked.     Patient is otherwise feeling well, without additional skin concerns.    Labs Reviewed:  Final Diagnosis   A. Central chest:  - Squamous cell carcinoma in situ - (see description)      B. Left deltoid:  - Basal cell carcinoma, superficial type - (see description)       Physical exam:  Vitals: There were no vitals taken for this visit.  GEN: This is a well developed, well-nourished female in no acute distress, in a pleasant mood.    SKIN: Total skin excluding the undergarment areas was performed. The exam included the head/face, neck, both arms, chest, back, abdomen, both legs, digits and/or nails.   - There are dome shaped bright red papules on the trunk and extremities .   - Multiple regular brown pigmented macules and papules are identified on the trunk and extremities. .   - Scattered brown macules on sun exposed areas.  - Waxy stuck on papules and plaques on trunk and extremities.    - There is an erythematous macule with overyling adherent scale on the  L lateral wrist.  - There are white 1-2 mm papules on the L central cheek.   - No other lesions of concern on areas examined.        Medications:  Current Outpatient Medications   Medication Sig Dispense Refill    atorvastatin (LIPITOR) 10 MG tablet Take 1 tablet (10 mg) by mouth daily 90 tablet 3    Blood Pressure Monitoring (ADULT BLOOD PRESSURE CUFF LG) KIT 1 each daily 1 kit 0    lisinopril-hydrochlorothiazide (ZESTORETIC) 20-12.5 MG tablet Take 1 tablet by mouth daily 90 tablet 3     No current facility-administered medications for this visit.      Past Medical History:   Patient Active Problem List   Diagnosis    Pure hypercholesterolemia    Benign essential hypertension    S/P abdominal hysterectomy and left salpingo-oophorectomy     Past Medical History:   Diagnosis Date    Abnormal uterine bleeding     Essential hypertension     Hyperlipidemia     Kidney stone     Nephrolithiasis     Varicella         CC Referred Self, MD  No address on file on close of this encounter.

## 2024-10-16 ENCOUNTER — OFFICE VISIT (OUTPATIENT)
Dept: DERMATOLOGY | Facility: CLINIC | Age: 64
End: 2024-10-16
Payer: COMMERCIAL

## 2024-10-16 DIAGNOSIS — D18.01 CHERRY ANGIOMA: ICD-10-CM

## 2024-10-16 DIAGNOSIS — L57.0 ACTINIC KERATOSES: ICD-10-CM

## 2024-10-16 DIAGNOSIS — L72.0 MILIA: ICD-10-CM

## 2024-10-16 DIAGNOSIS — D22.9 MULTIPLE BENIGN NEVI: ICD-10-CM

## 2024-10-16 DIAGNOSIS — L81.4 SOLAR LENTIGO: ICD-10-CM

## 2024-10-16 DIAGNOSIS — Z85.828 HISTORY OF NONMELANOMA SKIN CANCER: ICD-10-CM

## 2024-10-16 DIAGNOSIS — Z12.83 SKIN CANCER SCREENING: Primary | ICD-10-CM

## 2024-10-16 DIAGNOSIS — L82.1 SEBORRHEIC KERATOSES: ICD-10-CM

## 2024-10-16 PROCEDURE — 99213 OFFICE O/P EST LOW 20 MIN: CPT | Mod: 25 | Performed by: DERMATOLOGY

## 2024-10-16 PROCEDURE — 17000 DESTRUCT PREMALG LESION: CPT | Performed by: DERMATOLOGY

## 2024-10-16 ASSESSMENT — PAIN SCALES - GENERAL: PAINLEVEL: NO PAIN (0)

## 2024-10-16 NOTE — NURSING NOTE
Dermatology Rooming Note    Cande Curiel's goals for this visit include:   Chief Complaint   Patient presents with    Derm Problem     6 mo FBSE - spot on R temple of concern, forgot to mention at last visit but is unchanged     Alia Cade, EMT

## 2024-10-16 NOTE — LETTER
10/16/2024       RE: Cande Curiel  [address] [place] [address]     Dear Colleague,    Thank you for referring your patient, Cande Curiel, to the [facility] DERMATOLOGY CLINIC [place] at [facility]. Please see a copy of my visit note below.    [facility] Dermatology Note  Encounter Date: Oct 16, 2024  Office Visit     Dermatology Problem List:  Last skin check 10/15/24    1. NMSC   SCCis, central chest, s/p shave 4/10/24, s/p exc 5/30/24  - sBCC, left deltoid, s/p shave 4/10/24, s/p exc 5/30/24  - BCC, right shin, s/p MMS 91/22  - SCC right posterior shoulder, treated in [place], [place]  - SCC right forearm, [place] in [place]  - SCC central chest, s/p MMS 05/30/2024  - BCC L upper arm, s/p MMS 05/30/2024  2. Actinic keratoses s/p cryotherapy 10/3/22, 1/23/24  - Nasal bridge, s/p cryo 04/10/24  - L lateral wrist s/p cryo 10/15/2024  3. 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    ____________________________________________    Assessment & Plan:    # AK, L lateral wrist  - cryotherapy done 10/16/2024    # Benign lesions - SKs, cherry angiomas, lentigines  - No treatment required     # milia  - extracted today, discussed the possibility of the area coming back.     # Multiple benign nevi.   - Monitor for ABCDEs of melanoma   - Continue sun protection - recommend SPF 30 or higher with frequent application   - Return sooner if noticing changing or symptomatic lesions     # History of NMSC. No evidence of recurrent disease.  - Continue photoprotection - recommend SPF 30 or higher with frequent reapplication  - Continue yearly skin exams  - Advised to monitor for changing, non-healing, bleeding, painful, changing, or otherwise symptomatic lesions    Procedures Performed:   Cryotherapy procedure note: After verbal consent and discussion of risks and benefits including but no limited to  dyspigmentation/scar, blister, and pain, 1 AK was(were) treated with 1-2mm freeze border for 2 cycles with liquid nitrogen. Post cryotherapy instructions were provided.     Follow-up: 1 year in-person, or earlier for new or changing lesions    Staff and Scribe:     Scribe Disclosure:   I, Sherine Kia, am serving as a scribe; to document services personally performed by Renetta Haynes MD -based on data collection and the provider's statements to me.     Provider Disclosure:   The documentation recorded by the scribe accurately reflects the services I personally performed and the decisions made by me.    Renetta Haynes MD    Department of Dermatology  [facility] [place]: Phone: [phone], Fax: [phone]  10/22/2024       ____________________________________________    CC: Derm Problem (6 mo FBSE - spot on R temple of concern, forgot to mention at last visit but is unchanged)    HPI:  Ms. Cande Curiel is a(n) 63 year old female who presents today as a return patient for FBSE.    The patient reports that her MMS procedures went well and are healing well. She has one small spot on her temple that she would like checked. She also has a small white spot that she would like checked.     Patient is otherwise feeling well, without additional skin concerns.    Labs Reviewed:  Final Diagnosis   A. Central chest:  - Squamous cell carcinoma in situ - (see description)      B. Left deltoid:  - Basal cell carcinoma, superficial type - (see description)       Physical exam:  Vitals: There were no vitals taken for this visit.  GEN: This is a well developed, well-nourished female in no acute distress, in a pleasant mood.    SKIN: Total skin excluding the undergarment areas was performed. The exam included the head/face, neck, both arms, chest, back, abdomen, both legs, digits and/or nails.   - There are dome shaped bright red papules  on the trunk and extremities .   - Multiple regular brown pigmented macules and papules are identified on the trunk and extremities. .   - Scattered brown macules on sun exposed areas.  - Waxy stuck on papules and plaques on trunk and extremities.    - There is an erythematous macule with overyling adherent scale on the L lateral wrist.  - There are white 1-2 mm papules on the L central cheek.   - No other lesions of concern on areas examined.        Medications:  Current Outpatient Medications   Medication Sig Dispense Refill     atorvastatin (LIPITOR) 10 MG tablet Take 1 tablet (10 mg) by mouth daily 90 tablet 3     Blood Pressure Monitoring (ADULT BLOOD PRESSURE CUFF LG) KIT 1 each daily 1 kit 0     lisinopril-hydrochlorothiazide (ZESTORETIC) 20-12.5 MG tablet Take 1 tablet by mouth daily 90 tablet 3     No current facility-administered medications for this visit.      Past Medical History:   Patient Active Problem List   Diagnosis     Pure hypercholesterolemia     Benign essential hypertension     S/P abdominal hysterectomy and left salpingo-oophorectomy     Past Medical History:   Diagnosis Date     Abnormal uterine bleeding      Essential hypertension      Hyperlipidemia      Kidney stone      Nephrolithiasis      Varicella         CC Referred Self, MD  No address on file on close of this encounter.      Again, thank you for allowing me to participate in the care of your patient.      Sincerely,    Renetta Haynes MD

## 2024-10-16 NOTE — PATIENT INSTRUCTIONS
Checking for Skin Cancer  You can help find cancer early by checking your skin each month. There are 3 main kinds of skin cancer: melanoma, basal cell carcinoma, and squamous cell carcinoma. Doing monthly skin checks is the best way to find new marks, sores, or skin changes. Follow these instructions for checking your skin.   The ABCDEs of checking moles for melanoma   Check your moles or growths for signs of melanoma using ABCDE:   Asymmetry: The sides of the mole or growth don t match.  Border: The edges are ragged, notched, or blurred.  Color: The color within the mole or growth varies. It could be black, brown, tan, white, or shades of red, gray, or blue.  Diameter: The mole or growth is larger than   inch or 6 mm (size of a pencil eraser).  Evolving: The size, shape, texture, or color of the mole or growth is changing.     ABCDE's of moles on light skin.        ABCDE's of moles on dark skin may be harder to identify.     Checking for other types of skin cancer  Basal cell carcinoma or squamous cell carcinoma cause symptoms like:     A spot or mole that looks different from all other marks on your skin  Changes in how an area feels, such as itching, tenderness, or pain  Changes in the skin's surface, such as oozing, bleeding, or scaliness  A sore that doesn't heal  New swelling, redness, or spread of color beyond the border of a mole    Who s at risk?  Anyone of any skin color can get skin cancer. But you're at greater risk if you have:   Fair skin that freckles easily and burns instead of tanning  Light-colored or red hair  Light-colored eyes  Many moles or abnormal moles on your skin  A long history of unprotected exposure to sunlight or tanning beds  A history of many blistering sunburns as a child or teen  A family history of skin cancer  Been exposed to radiation or chemicals  A weakened immune system  Been exposed to arsenic  If you've had skin cancer in the past, you're at high risk of having it again.    How to check your skin  Do your monthly skin checkups in front of a full-length mirror. Use a room with good lighting so it's easier to see. Use a hand mirror to look at hard-to-see places like your buttocks and back. You can also have a trusted friend or family member help you with these checks. Check every part of your body, including your:   Head (ears, face, neck, and scalp)  Torso (front, back, sides, and under breasts)  Arms (tops, undersides, and armpits)  Hands (palms, backs, and fingers, including under the nails)  Lower back, buttocks, and genitals  Legs (front, back, and sides)  Feet (tops, soles, toes, including under the nails, and between toes)  Watch for new spots on your skin or a spot that's changing in color, shape, size.   If you have a lot of moles, take digital photos of them each month. Make sure to take photos both up close and from a distance. These can help you see if any moles change over time.   Know your skin  Most skin changes aren't cancer. But if you see any changes in your skin, call your healthcare provider right away. Only they can tell you if a change is a problem. If you have skin cancer, seeing your provider can be the first step to getting the treatment that could save your life.   Evelin last reviewed this educational content on 10/1/2021    8819-7236 [employer]. All rights reserved. This information is not intended as a substitute for professional medical care. Always follow your healthcare professional's instructions.     Cryotherapy    What is it?  Use of a very cold liquid, such as liquid nitrogen, to freeze and destroy abnormal skin cells that need to be removed    What should I expect?  Tenderness and redness  A small blister that might grow and fill with dark purple blood. There may be crusting.  More than one treatment may be needed if the lesions do not go away.    How do I care for the treated area?  Gently wash the area with your hands when  bathing.  Use a thin layer of Vaseline to help with healing. You may use a Band-Aid.   The area should heal within 7-10 days and may leave behind a pink or lighter color.   Do not use an antibiotic or Neosporin ointment.   You may take acetaminophen (Tylenol) for pain.     Call your doctor if you have:  Severe pain  Signs of infection (warmth, redness, cloudy yellow drainage, and or a bad smell)  Questions or concerns    Who should I call with questions?      [facility]: [phone]      [facility]: [phone]      For urgent needs outside of business hours call the [facility] at [phone] and ask for the dermatology resident on call

## 2025-01-06 ENCOUNTER — ANCILLARY PROCEDURE (OUTPATIENT)
Dept: MAMMOGRAPHY | Facility: CLINIC | Age: 65
End: 2025-01-06
Attending: INTERNAL MEDICINE
Payer: COMMERCIAL

## 2025-01-06 DIAGNOSIS — Z12.31 VISIT FOR SCREENING MAMMOGRAM: ICD-10-CM

## 2025-01-06 PROCEDURE — 77067 SCR MAMMO BI INCL CAD: CPT | Performed by: STUDENT IN AN ORGANIZED HEALTH CARE EDUCATION/TRAINING PROGRAM

## 2025-01-06 PROCEDURE — 77063 BREAST TOMOSYNTHESIS BI: CPT | Performed by: STUDENT IN AN ORGANIZED HEALTH CARE EDUCATION/TRAINING PROGRAM

## 2025-01-29 DIAGNOSIS — I10 BENIGN ESSENTIAL HYPERTENSION: ICD-10-CM

## 2025-02-01 DIAGNOSIS — E78.00 PURE HYPERCHOLESTEROLEMIA: ICD-10-CM

## 2025-02-03 ENCOUNTER — MYC REFILL (OUTPATIENT)
Dept: INTERNAL MEDICINE | Facility: CLINIC | Age: 65
End: 2025-02-03
Payer: COMMERCIAL

## 2025-02-03 DIAGNOSIS — I10 BENIGN ESSENTIAL HYPERTENSION: ICD-10-CM

## 2025-02-03 RX ORDER — LISINOPRIL AND HYDROCHLOROTHIAZIDE 12.5; 2 MG/1; MG/1
1 TABLET ORAL DAILY
Qty: 90 TABLET | Refills: 3 | OUTPATIENT
Start: 2025-02-03

## 2025-02-03 NOTE — TELEPHONE ENCOUNTER
Last Written Prescription:  lisinopril-hydrochlorothiazide (ZESTORETIC) 20-12.5 MG tablet 90 tablet 3 2/20/2024     ----------------------  Last Visit Date: 5/13/24  Future Visit Date: 0  ----------------------        Refill decision: Medication unable to be refilled by RN due to:     Refill pended and routed to the provider for review/determination due to   BP Readings from Last 3 Encounters:   05/30/24 (!) 159/97 05/13/24 (!) 154/85 03/30/24 (!) 156/88         []    Pt not seen within past 12 months, No FOV or FOV exceeds timeframe per protocol     []    Compliance - lapse in therapy/gap in refills; No Shows; Cancellations    []    Verification - order discrepancy, clarification needed, Sig modification needed    []    Controlled medication    []    Medication not included in refill protocol policy    []    Abnormal labs/test:    []    Overdue labs/test:      []    Medication not active on Pt's med list    []    Drug interaction Warning    []    Medication - Last script is Reported/Historical/Transitional    []    Advanced refill request    []    Review Needed: New med; Med adjusted within <= 30 days; Safety Alert; Lab monitoring required    []    Other:     Request from pharmacy:  Requested Prescriptions   Pending Prescriptions Disp Refills    lisinopril-hydrochlorothiazide (ZESTORETIC) 20-12.5 MG tablet [Pharmacy Med Name: LISINOPRIL-HCTZ 20-12.5 MG TAB] 90 tablet 3     Sig: TAKE 1 TABLET BY MOUTH EVERY DAY       Diuretics (Including Combos) Protocol Failed - 2/3/2025  4:03 PM        Failed - Most recent blood pressure under 140/90 in past 12 months     BP Readings from Last 3 Encounters:   05/30/24 (!) 159/97 05/13/24 (!) 154/85 03/30/24 (!) 156/88       No data recorded            Passed - Potassium level on file in past 12 months        Passed - Medication is active on med list        Passed - Medication indicated for associated diagnosis     Medication is associated with one or more of the following  diagnoses:     Edema   Hypertension   Heart Failure   Meniere's Disease   Bilateral localized swelling of lower limbs   Pulmonary Hypertension          Passed - Has GFR on file in past 12 months and most recent value is normal        Passed - Recent (12 mo) or future (90 days) visit within the authorizing provider's specialty     The patient must have completed an in-person or virtual visit within the past 12 months or has a future visit scheduled within the next 90 days with the authorizing provider s specialty.  Urgent care and e-visits do not qualify as an office visit for this protocol.          Passed - Patient is age 18 or older        Passed - No active pregancy on record        Passed - No positive pregnancy test in past 12 months       ACE Inhibitors (Including Combos) Protocol Failed - 2/3/2025  4:03 PM        Failed - Most recent blood pressure under 140/90 in past 12 months- Clinicial or Patient Reported     BP Readings from Last 3 Encounters:   05/30/24 (!) 159/97   05/13/24 (!) 154/85   03/30/24 (!) 156/88       No data recorded            Passed - Medication is active on med list        Passed - Medication indicated for associated diagnosis     Medication is associated with one or more of the following diagnoses:     Chronic Kidney Disease (CKD)   Coronary Artery Disease (CAD)   Diabetes   Heart Failure (HF)   Hypertension (HTN)   Nephropathy   History of myocarditis   Tachycardia induced cardiomyopathy   STEMI (ST elevation myocardial infarction)   Spontaneous dissection of coronary artery   Status post percutaneous transluminal coronary angioplasty            Passed - Recent (12 mo) or future (90 days) visit within the authorizing provider's specialty     The patient must have completed an in-person or virtual visit within the past 12 months or has a future visit scheduled within the next 90 days with the authorizing provider s specialty.  Urgent care and e-visits do not qualify as an office visit  for this protocol.          Passed - Most recent GFR on file in the past 12 months >30        Passed - Patient is age 18 or older        Passed - No active pregnancy on record        Passed - Normal serum potassium on file in past 12 months     Recent Labs   Lab Test 05/09/24  0828   POTASSIUM 3.8             Passed - No positive pregnancy test within past 12 months

## 2025-02-03 NOTE — TELEPHONE ENCOUNTER
Diuretics & Ace Inhibitors (Including Combos) Protocol Failed  Rerun Protocol (2/3/2025 9:46 AM)    Most recent blood pressure under 140/90 in past 12 months        BP Readings from Last 3 Encounters:   05/30/24 (!) 159/97   05/13/24 (!) 154/85   03/30/24 (!) 156/88

## 2025-02-05 RX ORDER — LISINOPRIL AND HYDROCHLOROTHIAZIDE 12.5; 2 MG/1; MG/1
1 TABLET ORAL DAILY
Qty: 90 TABLET | Refills: 3 | Status: SHIPPED | OUTPATIENT
Start: 2025-02-05

## 2025-02-05 RX ORDER — ATORVASTATIN CALCIUM 10 MG/1
10 TABLET, FILM COATED ORAL DAILY
Qty: 90 TABLET | Refills: 1 | Status: SHIPPED | OUTPATIENT
Start: 2025-02-05

## 2025-02-05 NOTE — TELEPHONE ENCOUNTER
LVD: 5/13/24 Inez  Medication:    atorvastatin (LIPITOR) 10 MG tablet 90 tablet 3 2/20/2024 -- No   Sig - Route: Take 1 tablet (10 mg) by mouth daily - Oral     LDL Cholesterol Calculated   Date Value Ref Range Status   05/09/2024 91 <=100 mg/dL Final   02/25/2021 103 (H) <100 mg/dL Final     Comment:     Above desirable:  100-129 mg/dl  Borderline High:  130-159 mg/dL  High:             160-189 mg/dL  Very high:       >189 mg/dl         Refill decision: Medication refilled per  Medication Refill in Ambulatory Care  policy.

## 2025-04-09 NOTE — PROGRESS NOTES
[facility] Dermatology Note  Encounter Date: Apr 11, 2025  Office Visit     Dermatology Problem List:  Last skin check 04/11/2025    1. NMSC   SCCis, central chest, s/p shave 4/10/24, s/p exc 5/30/24  - sBCC, left deltoid, s/p shave 4/10/24, s/p exc 5/30/24  - BCC, right shin, s/p MMS 91/22  - SCC right posterior shoulder, treated in [place], [place]  - SCC right forearm, treated in [place]  - SCC central chest, s/p MMS 05/30/2024  - BCC L upper arm, s/p MMS 05/30/2024  2. 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  ____________________________________________    Assessment & Plan:    # AK x2, upper cutaneous lip  - Cryotherapy performed today. See procedure note below.   - also discussed field therapy, if area does not resolve with cryo, advised pt to message me and will send efudex.     # ISK, R upper chest   - Cryotherapy performed today. See procedure note below.     # Benign lesions - SKs, cherry angiomas, lentigenes.  - No treatment required    # Multiple benign nevi   - Monitor for ABCDEs of melanoma   - Continue sun protection - recommend SPF 30 or higher with frequent application   - Return sooner if noticing changing or symptomatic lesions     # History of NMSC. No evidence of recurrent disease.  - Continue photoprotection - recommend SPF 30 or higher with frequent reapplication  - Continue yearly skin exams  - Advised to monitor for changing, non-healing, bleeding, painful, changing, or otherwise symptomatic lesions    Procedures Performed:   Cryotherapy procedure note: After verbal consent and discussion of risks and benefits including but no limited to dyspigmentation/scar, blister, and pain, 2 AK, 1 ISK was(were) treated with 1-2mm freeze border for 2 cycles with liquid nitrogen. Post cryotherapy instructions were  provided.     Follow-up: 1 year in-person, or earlier for new or changing lesions    Staff and Scribe:     Scribe Disclosure:   I, Sherine Adam, am serving as a scribe; to document services personally performed by Renetta Haynes MD -based on data collection and the provider's statements to me.     Provider Disclosure:   The documentation recorded by the scribe accurately reflects the services I personally performed and the decisions made by me.    Renetta Haynes MD    Department of Dermatology  [facility]: Phone: [phone], Fax: [phone]  4/16/2025         ____________________________________________    CC: Skin Check (FBSE - no areas of concern)    HPI:  Ms. Cande Curiel is a(n) 64 year old female who presents today as a return patient for FBSE.    The patient reports that she has no specific skin concerns today.   Denies having any areas on her skin that are painful, growing, changing.     Patient is otherwise feeling well, without additional skin concerns.    Labs Reviewed:  N/A    Physical exam:  Vitals: There were no vitals taken for this visit.  GEN: This is a well developed, well-nourished female in no acute distress, in a pleasant mood.    SKIN: Total skin excluding the undergarment areas was performed. The exam included the head/face, neck, both arms, chest, back, abdomen, both legs, digits and/or nails.   - AK x2, upper cutaneous lip  - There are erythematous macules with overyling adherent scale on the upper cutaneous lip.   - There are dome shaped bright red papules on the trunk and extremities .   - Multiple regular brown pigmented macules and papules are identified on the trunk and extremities.   - Scattered brown macules on sun exposed areas.  - Waxy stuck on papules and plaques on trunk and extremities.   - There is a tan to brown waxy stuck on papule with surrounding erythema on the R upper  chest.   - No other lesions of concern on areas examined.     Medications:  Current Outpatient Medications   Medication Sig Dispense Refill    atorvastatin (LIPITOR) 10 MG tablet TAKE 1 TABLET (10 MG) BY MOUTH DAILY. 90 tablet 1    Blood Pressure Monitoring (ADULT BLOOD PRESSURE CUFF LG) KIT 1 each daily 1 kit 0    lisinopril-hydrochlorothiazide (ZESTORETIC) 20-12.5 MG tablet Take 1 tablet by mouth daily. 90 tablet 3     No current facility-administered medications for this visit.      Past Medical History:   Patient Active Problem List   Diagnosis    Pure hypercholesterolemia    Benign essential hypertension    S/P abdominal hysterectomy and left salpingo-oophorectomy     Past Medical History:   Diagnosis Date    Abnormal uterine bleeding     Essential hypertension     Hyperlipidemia     Kidney stone     Nephrolithiasis     Varicella         CC Referred Self, MD  No address on file on close of this encounter.

## 2025-04-11 ENCOUNTER — OFFICE VISIT (OUTPATIENT)
Dept: DERMATOLOGY | Facility: CLINIC | Age: 65
End: 2025-04-11
Attending: DERMATOLOGY
Payer: COMMERCIAL

## 2025-04-11 DIAGNOSIS — L82.0 INFLAMED SEBORRHEIC KERATOSIS: ICD-10-CM

## 2025-04-11 DIAGNOSIS — L81.4 SOLAR LENTIGO: ICD-10-CM

## 2025-04-11 DIAGNOSIS — L57.0 ACTINIC KERATOSES: ICD-10-CM

## 2025-04-11 DIAGNOSIS — D22.9 MULTIPLE BENIGN NEVI: ICD-10-CM

## 2025-04-11 DIAGNOSIS — L82.1 SEBORRHEIC KERATOSES: ICD-10-CM

## 2025-04-11 DIAGNOSIS — D18.01 CHERRY ANGIOMA: ICD-10-CM

## 2025-04-11 DIAGNOSIS — Z85.828 HISTORY OF NONMELANOMA SKIN CANCER: ICD-10-CM

## 2025-04-11 DIAGNOSIS — Z12.83 SKIN CANCER SCREENING: Primary | ICD-10-CM

## 2025-04-11 PROCEDURE — 17110 DESTRUCTION B9 LES UP TO 14: CPT | Performed by: DERMATOLOGY

## 2025-04-11 PROCEDURE — 17003 DESTRUCT PREMALG LES 2-14: CPT | Mod: XU | Performed by: DERMATOLOGY

## 2025-04-11 PROCEDURE — 1126F AMNT PAIN NOTED NONE PRSNT: CPT | Performed by: DERMATOLOGY

## 2025-04-11 PROCEDURE — 17000 DESTRUCT PREMALG LESION: CPT | Mod: XS | Performed by: DERMATOLOGY

## 2025-04-11 PROCEDURE — 99213 OFFICE O/P EST LOW 20 MIN: CPT | Mod: 25 | Performed by: DERMATOLOGY

## 2025-04-11 ASSESSMENT — PAIN SCALES - GENERAL: PAINLEVEL_OUTOF10: NO PAIN (0)

## 2025-04-11 NOTE — PATIENT INSTRUCTIONS
Checking for Skin Cancer  You can help find cancer early by checking your skin each month. There are 3 main kinds of skin cancer: melanoma, basal cell carcinoma, and squamous cell carcinoma. Doing monthly skin checks is the best way to find new marks, sores, or skin changes. Follow these instructions for checking your skin.   The ABCDEs of checking moles for melanoma   Check your moles or growths for signs of melanoma using ABCDE:   Asymmetry: The sides of the mole or growth don t match.  Border: The edges are ragged, notched, or blurred.  Color: The color within the mole or growth varies. It could be black, brown, tan, white, or shades of red, gray, or blue.  Diameter: The mole or growth is larger than   inch or 6 mm (size of a pencil eraser).  Evolving: The size, shape, texture, or color of the mole or growth is changing.     ABCDE's of moles on light skin.        ABCDE's of moles on dark skin may be harder to identify.     Checking for other types of skin cancer  Basal cell carcinoma or squamous cell carcinoma cause symptoms like:     A spot or mole that looks different from all other marks on your skin  Changes in how an area feels, such as itching, tenderness, or pain  Changes in the skin's surface, such as oozing, bleeding, or scaliness  A sore that doesn't heal  New swelling, redness, or spread of color beyond the border of a mole    Who s at risk?  Anyone of any skin color can get skin cancer. But you're at greater risk if you have:   Fair skin that freckles easily and burns instead of tanning  Light-colored or red hair  Light-colored eyes  Many moles or abnormal moles on your skin  A long history of unprotected exposure to sunlight or tanning beds  A history of many blistering sunburns as a child or teen  A family history of skin cancer  Been exposed to radiation or chemicals  A weakened immune system  Been exposed to arsenic  If you've had skin cancer in the past, you're at high risk of having it again.    How to check your skin  Do your monthly skin checkups in front of a full-length mirror. Use a room with good lighting so it's easier to see. Use a hand mirror to look at hard-to-see places like your buttocks and back. You can also have a trusted friend or family member help you with these checks. Check every part of your body, including your:   Head (ears, face, neck, and scalp)  Torso (front, back, sides, and under breasts)  Arms (tops, undersides, and armpits)  Hands (palms, backs, and fingers, including under the nails)  Lower back, buttocks, and genitals  Legs (front, back, and sides)  Feet (tops, soles, toes, including under the nails, and between toes)  Watch for new spots on your skin or a spot that's changing in color, shape, size.   If you have a lot of moles, take digital photos of them each month. Make sure to take photos both up close and from a distance. These can help you see if any moles change over time.   Know your skin  Most skin changes aren't cancer. But if you see any changes in your skin, call your healthcare provider right away. Only they can tell you if a change is a problem. If you have skin cancer, seeing your provider can be the first step to getting the treatment that could save your life.   Evelin last reviewed this educational content on 10/1/2021    3568-3369 [employer]. All rights reserved. This information is not intended as a substitute for professional medical care. Always follow your healthcare professional's instructions.     Cryotherapy    What is it?  Use of a very cold liquid, such as liquid nitrogen, to freeze and destroy abnormal skin cells that need to be removed    What should I expect?  Tenderness and redness  A small blister that might grow and fill with dark purple blood. There may be crusting.  More than one treatment may be needed if the lesions do not go away.    How do I care for the treated area?  Gently wash the area with your hands when  bathing.  Use a thin layer of Vaseline to help with healing. You may use a Band-Aid.   The area should heal within 7-10 days and may leave behind a pink or lighter color.   Do not use an antibiotic or Neosporin ointment.   You may take acetaminophen (Tylenol) for pain.     Call your doctor if you have:  Severe pain  Signs of infection (warmth, redness, cloudy yellow drainage, and or a bad smell)  Questions or concerns    Who should I call with questions?      [facility]: [phone]      [facility]: [phone]      For urgent needs outside of business hours call the [facility] at [phone] and ask for the dermatology resident on call

## 2025-04-11 NOTE — NURSING NOTE
Dermatology Rooming Note    Cande Curiel's goals for this visit include:   Chief Complaint   Patient presents with    Skin Check     FBSE - no areas of concern     Alia Cade CA

## 2025-04-11 NOTE — LETTER
4/11/2025       RE: Cande Curiel  [address] [place] [address]     Dear Colleague,    Thank you for referring your patient, Cande Curiel, to the [facility] [place] at [facility]. Please see a copy of my visit note below.    [facility] Dermatology Note  Encounter Date: Apr 11, 2025  Office Visit     Dermatology Problem List:  Last skin check 04/11/2025    1. NMSC   SCCis, central chest, s/p shave 4/10/24, s/p exc 5/30/24  - sBCC, left deltoid, s/p shave 4/10/24, s/p exc 5/30/24  - BCC, right shin, s/p MMS 91/22  - SCC right posterior shoulder, treated in [place], [place]  - SCC right forearm, treated in [place]  - SCC central chest, s/p MMS 05/30/2024  - BCC L upper arm, s/p MMS 05/30/2024  2. 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  ____________________________________________    Assessment & Plan:    # AK x2, upper cutaneous lip  - Cryotherapy performed today. See procedure note below.   - also discussed field therapy, if area does not resolve with cryo, advised pt to message me and will send efudex.     # ISK, R upper chest   - Cryotherapy performed today. See procedure note below.     # Benign lesions - SKs, cherry angiomas, lentigenes.  - No treatment required    # Multiple benign nevi   - Monitor for ABCDEs of melanoma   - Continue sun protection - recommend SPF 30 or higher with frequent application   - Return sooner if noticing changing or symptomatic lesions     # History of NMSC. No evidence of recurrent disease.  - Continue photoprotection - recommend SPF 30 or higher with frequent reapplication  - Continue yearly skin exams  - Advised to monitor for changing, non-healing, bleeding, painful, changing, or otherwise symptomatic  lesions    Procedures Performed:   Cryotherapy procedure note: After verbal consent and discussion of risks and benefits including but no limited to dyspigmentation/scar, blister, and pain, 2 AK, 1 ISK was(were) treated with 1-2mm freeze border for 2 cycles with liquid nitrogen. Post cryotherapy instructions were provided.     Follow-up: 1 year in-person, or earlier for new or changing lesions    Staff and Scribe:     Scribe Disclosure:   I, Sherine Adam, am serving as a scribe; to document services personally performed by Renetta Haynes MD -based on data collection and the provider's statements to me.     Provider Disclosure:   The documentation recorded by the scribe accurately reflects the services I personally performed and the decisions made by me.    Renetta Haynes MD    Department of Dermatology  [facility]: Phone: [phone], Fax: [phone]  4/16/2025         ____________________________________________    CC: Skin Check (FBSE - no areas of concern)    HPI:  Ms. Cande Curiel is a(n) 64 year old female who presents today as a return patient for FBSE.    The patient reports that she has no specific skin concerns today.   Denies having any areas on her skin that are painful, growing, changing.     Patient is otherwise feeling well, without additional skin concerns.    Labs Reviewed:  N/A    Physical exam:  Vitals: There were no vitals taken for this visit.  GEN: This is a well developed, well-nourished female in no acute distress, in a pleasant mood.    SKIN: Total skin excluding the undergarment areas was performed. The exam included the head/face, neck, both arms, chest, back, abdomen, both legs, digits and/or nails.   - AK x2, upper cutaneous lip  - There are erythematous macules with overyling adherent scale on the upper cutaneous lip.   - There are dome shaped bright red papules on the trunk and  extremities .   - Multiple regular brown pigmented macules and papules are identified on the trunk and extremities.   - Scattered brown macules on sun exposed areas.  - Waxy stuck on papules and plaques on trunk and extremities.   - There is a tan to brown waxy stuck on papule with surrounding erythema on the R upper chest.   - No other lesions of concern on areas examined.     Medications:  Current Outpatient Medications   Medication Sig Dispense Refill     atorvastatin (LIPITOR) 10 MG tablet TAKE 1 TABLET (10 MG) BY MOUTH DAILY. 90 tablet 1     Blood Pressure Monitoring (ADULT BLOOD PRESSURE CUFF LG) KIT 1 each daily 1 kit 0     lisinopril-hydrochlorothiazide (ZESTORETIC) 20-12.5 MG tablet Take 1 tablet by mouth daily. 90 tablet 3     No current facility-administered medications for this visit.      Past Medical History:   Patient Active Problem List   Diagnosis     Pure hypercholesterolemia     Benign essential hypertension     S/P abdominal hysterectomy and left salpingo-oophorectomy     Past Medical History:   Diagnosis Date     Abnormal uterine bleeding      Essential hypertension      Hyperlipidemia      Kidney stone      Nephrolithiasis      Varicella         CC Referred Self, MD  No address on file on close of this encounter.      Again, thank you for allowing me to participate in the care of your patient.      Sincerely,    Renetta Haynes MD

## 2025-05-22 SDOH — HEALTH STABILITY: PHYSICAL HEALTH: ON AVERAGE, HOW MANY DAYS PER WEEK DO YOU ENGAGE IN MODERATE TO STRENUOUS EXERCISE (LIKE A BRISK WALK)?: 6 DAYS

## 2025-05-22 SDOH — HEALTH STABILITY: PHYSICAL HEALTH: ON AVERAGE, HOW MANY MINUTES DO YOU ENGAGE IN EXERCISE AT THIS LEVEL?: 60 MIN

## 2025-05-22 ASSESSMENT — SOCIAL DETERMINANTS OF HEALTH (SDOH): HOW OFTEN DO YOU GET TOGETHER WITH FRIENDS OR RELATIVES?: THREE TIMES A WEEK

## 2025-05-27 ENCOUNTER — OFFICE VISIT (OUTPATIENT)
Dept: INTERNAL MEDICINE | Facility: CLINIC | Age: 65
End: 2025-05-27
Payer: COMMERCIAL

## 2025-05-27 VITALS
BODY MASS INDEX: 23.28 KG/M2 | OXYGEN SATURATION: 98 % | HEART RATE: 105 BPM | DIASTOLIC BLOOD PRESSURE: 87 MMHG | WEIGHT: 131.4 LBS | HEIGHT: 63 IN | RESPIRATION RATE: 16 BRPM | SYSTOLIC BLOOD PRESSURE: 141 MMHG

## 2025-05-27 DIAGNOSIS — E78.00 PURE HYPERCHOLESTEROLEMIA: ICD-10-CM

## 2025-05-27 DIAGNOSIS — Z00.00 PREVENTATIVE HEALTH CARE: Primary | ICD-10-CM

## 2025-05-27 DIAGNOSIS — I10 BENIGN ESSENTIAL HYPERTENSION: ICD-10-CM

## 2025-05-27 DIAGNOSIS — E89.40 SURGICAL MENOPAUSE: ICD-10-CM

## 2025-05-27 RX ORDER — ATORVASTATIN CALCIUM 10 MG/1
10 TABLET, FILM COATED ORAL DAILY
Qty: 90 TABLET | Refills: 1 | Status: SHIPPED | OUTPATIENT
Start: 2025-05-27

## 2025-05-27 RX ORDER — LISINOPRIL AND HYDROCHLOROTHIAZIDE 12.5; 2 MG/1; MG/1
1 TABLET ORAL DAILY
Qty: 90 TABLET | Refills: 3 | Status: SHIPPED | OUTPATIENT
Start: 2025-05-27

## 2025-05-27 NOTE — PROGRESS NOTES
Preventive Care Visit  [facility]  Analilia Wiley MD, Internal Medicine  May 27, 2025      Assessment & Plan     Preventative health care  Discussed preventive healthcare with patient.  Recommend checking vitamin D.  Patient is up-to-date on recommended vaccines.  - Vitamin D Deficiency; Future    Benign essential hypertension  Blood pressure is elevated today.  However, home blood pressures are consistently within acceptable range.  Recommend to continue with current medical therapy without changes.  - BASIC METABOLIC PANEL; Future  - REVIEW OF HEALTH MAINTENANCE PROTOCOL ORDERS  - lisinopril-hydrochlorothiazide (ZESTORETIC) 20-12.5 MG tablet; Take 1 tablet by mouth daily.    Pure hypercholesterolemia  Reviewed lipid management with patient.  Will check lipid panel today.  Patient will be advised on test results accordingly.  Atorvastatin was refilled.  - Lipid panel reflex to direct LDL Non-fasting; Future  - atorvastatin (LIPITOR) 10 MG tablet; Take 1 tablet (10 mg) by mouth daily.  - Hemoglobin A1c; Future    Surgical menopause  Given her history of surgical menopause and extensive hormone replacement therapy, recommend checking DEXA scan.  - DX Bone Density; Future            Counseling  Appropriate preventive services were addressed with this patient via screening, questionnaire, or discussion as appropriate for fall prevention, nutrition, physical activity, Tobacco-use cessation, social engagement, weight loss and cognition.  Checklist reviewing preventive services available has been given to the patient.  Reviewed patient's diet, addressing concerns and/or questions.           Truman Castellon is a 64 year old, presenting for the following:  Physical        5/27/2025     5:16 PM   Additional Questions   Roomed by everette IRELAND     Patient is here for routine preventive visit.  She states that overall she has been doing well.  She has been physically  active.  She is wondering if any additional screening would be necessary for her.    Advance Care Planning    Document on file is a Health Care Directive or POLST.        5/22/2025   General Health   How would you rate your overall physical health? Excellent   Feel stress (tense, anxious, or unable to sleep) Only a little   (!) STRESS CONCERN      5/22/2025   Nutrition   Three or more servings of calcium each day? Yes   Diet: Regular (no restrictions)   How many servings of fruit and vegetables per day? (!) 2-3   How many sweetened beverages each day? 0-1         5/22/2025   Exercise   Days per week of moderate/strenous exercise 6 days   Average minutes spent exercising at this level 60 min         5/22/2025   Social Factors   Frequency of gathering with friends or relatives Three times a week   Worry food won't last until get money to buy more No   Food not last or not have enough money for food? No   Do you have housing? (Housing is defined as stable permanent housing and does not include staying outside in a car, in a tent, in an abandoned building, in an overnight shelter, or couch-surfing.) Yes   Are you worried about losing your housing? No   Lack of transportation? No   Unable to get utilities (heat,electricity)? No         5/22/2025   Fall Risk   Fallen 2 or more times in the past year? No   Trouble with walking or balance? No          5/22/2025   Dental   Dentist two times every year? Yes         Today's PHQ-2 Score:       5/27/2025     5:16 PM   PHQ-2 ( 1999 Pf[employer])   PHQ-2 Score Incomplete           5/22/2025   Substance Use   Alcohol more than 3/day or more than 7/wk No   Do you use any other substances recreationally? No     Social History     Tobacco Use    Smoking status: Never     Passive exposure: Never    Smokeless tobacco: Never   Substance Use Topics    Alcohol use: Yes     Alcohol/week: 1.0 standard drink of alcohol    Drug use: No           1/6/2025   LAST FHS-7 RESULTS   1st degree relative  "breast or ovarian cancer No   Any relative bilateral breast cancer No   Any male have breast cancer No   Any ONE woman have BOTH breast AND ovarian cancer No   Any woman with breast cancer before 50yrs No   2 or more relatives with breast AND/OR ovarian cancer No   2 or more relatives with breast AND/OR bowel cancer No        Mammogram Screening - Mammogram every 1-2 years updated in Health Maintenance based on mutual decision making        5/22/2025   STI Screening   New sexual partner(s) since last STI/HIV test? No     History of abnormal Pap smear: Status post hysterectomy with removal of cervix and no history of CIN2 or greater or cervical cancer. Health Maintenance and Surgical History updated.       ASCVD Risk   The 10-year ASCVD risk score (Amber REED, et al., 2019) is: 8.7%    Values used to calculate the score:      Age: 64 years      Sex: Female      Is Non- : No      Diabetic: No      Tobacco smoker: No      Systolic Blood Pressure: 180 mmHg      Is BP treated: Yes      HDL Cholesterol: 63 mg/dL      Total Cholesterol: 168 mg/dL           Reviewed and updated as needed this visit by Provider                             Objective    Exam  BP (!) 180/80 (BP Location: Left arm, Patient Position: Sitting, Cuff Size: Adult Regular)   Pulse 105   Resp 16   Ht 1.6 m (5' 2.99\")   Wt 59.6 kg (131 lb 6.4 oz)   SpO2 98%   BMI 23.28 kg/m     Estimated body mass index is 23.28 kg/m  as calculated from the following:    Height as of this encounter: 1.6 m (5' 2.99\").    Weight as of this encounter: 59.6 kg (131 lb 6.4 oz).    Physical Exam  GENERAL: alert and no distress  NECK: no adenopathy, no asymmetry, masses, or scars  RESP: lungs clear to auscultation - no rales, rhonchi or wheezes  CV: regular rate and rhythm, normal S1 S2, no S3 or S4, no murmur, click or rub, no peripheral edema  ABDOMEN: soft, nontender and bowel sounds normal  MS: no gross musculoskeletal defects noted, " no edema  SKIN: no suspicious lesions or rashes  NEURO: Normal strength and tone, mentation intact and speech normal  PSYCH: mentation appears normal, affect normal/bright        Signed Electronically by: Analilia Wiley MD

## 2025-05-27 NOTE — PROGRESS NOTES
Triple Blood Pressure Check Visit    Cande Curiel presents in clinic today for blood pressure monitoring. The patient was greeted and escorted back to the room without incident. The patient's arm was elevated to heart level and they were instructed to uncross their legs. A triple blood pressure AHA was preformed wherein the blood pressure machine took Cande Curiel's blood pressure over the course of ten minutes. The following were the individual blood pressures that were observed at today's visit:    (1) 145 / 89     (2) 135 / 89    (3) 144 / 84    The average blood pressure from today's individual readings were 141 / 87. The results of today's visit were communicated to the ordering provider.    ESTELA Bertrand at 5:45 PM on 5/27/2025.

## 2025-06-06 ENCOUNTER — LAB (OUTPATIENT)
Dept: LAB | Facility: CLINIC | Age: 65
End: 2025-06-06
Payer: COMMERCIAL

## 2025-06-06 ENCOUNTER — RESULTS FOLLOW-UP (OUTPATIENT)
Dept: INTERNAL MEDICINE | Facility: CLINIC | Age: 65
End: 2025-06-06

## 2025-06-06 DIAGNOSIS — Z00.00 PREVENTATIVE HEALTH CARE: ICD-10-CM

## 2025-06-06 DIAGNOSIS — E78.00 PURE HYPERCHOLESTEROLEMIA: ICD-10-CM

## 2025-06-06 DIAGNOSIS — I10 BENIGN ESSENTIAL HYPERTENSION: ICD-10-CM

## 2025-06-06 LAB
ANION GAP SERPL CALCULATED.3IONS-SCNC: 12 MMOL/L (ref 7–15)
BUN SERPL-MCNC: 16.4 MG/DL (ref 8–23)
CALCIUM SERPL-MCNC: 9.2 MG/DL (ref 8.8–10.4)
CHLORIDE SERPL-SCNC: 103 MMOL/L (ref 98–107)
CHOLEST SERPL-MCNC: 163 MG/DL
CREAT SERPL-MCNC: 0.72 MG/DL (ref 0.51–0.95)
EGFRCR SERPLBLD CKD-EPI 2021: >90 ML/MIN/1.73M2
EST. AVERAGE GLUCOSE BLD GHB EST-MCNC: 117 MG/DL
FASTING STATUS PATIENT QL REPORTED: YES
FASTING STATUS PATIENT QL REPORTED: YES
GLUCOSE SERPL-MCNC: 105 MG/DL (ref 70–99)
HBA1C MFR BLD: 5.7 %
HCO3 SERPL-SCNC: 26 MMOL/L (ref 22–29)
HDLC SERPL-MCNC: 59 MG/DL
LDLC SERPL CALC-MCNC: 89 MG/DL
NONHDLC SERPL-MCNC: 104 MG/DL
POTASSIUM SERPL-SCNC: 3.6 MMOL/L (ref 3.4–5.3)
SODIUM SERPL-SCNC: 141 MMOL/L (ref 135–145)
TRIGL SERPL-MCNC: 76 MG/DL
VIT D+METAB SERPL-MCNC: 53 NG/ML (ref 20–50)

## 2025-06-06 PROCEDURE — 82306 VITAMIN D 25 HYDROXY: CPT

## 2025-06-06 PROCEDURE — 82465 ASSAY BLD/SERUM CHOLESTEROL: CPT

## 2025-06-06 PROCEDURE — 36415 COLL VENOUS BLD VENIPUNCTURE: CPT

## 2025-06-06 PROCEDURE — 82435 ASSAY OF BLOOD CHLORIDE: CPT

## 2025-06-06 PROCEDURE — 83036 HEMOGLOBIN GLYCOSYLATED A1C: CPT

## 2025-06-12 ENCOUNTER — ANCILLARY PROCEDURE (OUTPATIENT)
Dept: BONE DENSITY | Facility: CLINIC | Age: 65
End: 2025-06-12
Attending: INTERNAL MEDICINE
Payer: COMMERCIAL

## 2025-06-12 DIAGNOSIS — E89.40 SURGICAL MENOPAUSE: ICD-10-CM

## 2025-06-12 PROCEDURE — 77080 DXA BONE DENSITY AXIAL: CPT | Mod: TC | Performed by: RADIOLOGY

## 2025-06-12 PROCEDURE — 77091 TBS TECHL CALCULATION ONLY: CPT | Performed by: RADIOLOGY

## 2025-07-22 ENCOUNTER — MYC MEDICAL ADVICE (OUTPATIENT)
Dept: INTERNAL MEDICINE | Facility: CLINIC | Age: 65
End: 2025-07-22
Payer: COMMERCIAL

## 2025-07-22 DIAGNOSIS — E89.40 SURGICAL MENOPAUSE: Primary | ICD-10-CM

## (undated) RX ORDER — LIDOCAINE HYDROCHLORIDE AND EPINEPHRINE 10; 10 MG/ML; UG/ML
INJECTION, SOLUTION INFILTRATION; PERINEURAL
Status: DISPENSED
Start: 2022-09-01

## (undated) RX ORDER — LIDOCAINE HYDROCHLORIDE AND EPINEPHRINE 10; 10 MG/ML; UG/ML
INJECTION, SOLUTION INFILTRATION; PERINEURAL
Status: DISPENSED
Start: 2024-01-23